# Patient Record
Sex: MALE | Race: WHITE | NOT HISPANIC OR LATINO | Employment: OTHER | URBAN - METROPOLITAN AREA
[De-identification: names, ages, dates, MRNs, and addresses within clinical notes are randomized per-mention and may not be internally consistent; named-entity substitution may affect disease eponyms.]

---

## 2017-05-11 ENCOUNTER — ALLSCRIPTS OFFICE VISIT (OUTPATIENT)
Dept: OTHER | Facility: OTHER | Age: 57
End: 2017-05-11

## 2017-11-07 ENCOUNTER — ALLSCRIPTS OFFICE VISIT (OUTPATIENT)
Dept: OTHER | Facility: OTHER | Age: 57
End: 2017-11-07

## 2017-11-07 DIAGNOSIS — E11.69 TYPE 2 DIABETES MELLITUS WITH OTHER SPECIFIED COMPLICATION (HCC): ICD-10-CM

## 2017-11-07 DIAGNOSIS — Z12.11 ENCOUNTER FOR SCREENING FOR MALIGNANT NEOPLASM OF COLON: ICD-10-CM

## 2017-11-07 DIAGNOSIS — Z12.5 ENCOUNTER FOR SCREENING FOR MALIGNANT NEOPLASM OF PROSTATE: ICD-10-CM

## 2017-11-15 NOTE — PROGRESS NOTES
Assessment    1  Diabetes mellitus type 2 in obese (250 00,278 00) (E11 69,E66 9)   2  Morbid obesity (278 01) (E66 01)   3  De Quervain's tenosynovitis (727 04) (M65 4)   4  Screening PSA (prostate specific antigen) (V76 44) (Z12 5)   5  Colon cancer screening (V76 51) (Z12 11)    Plan  Colon cancer screening    · (1) OCCULT BLOOD, FECAL IMMUNOCHEMICAL TEST; Status:Active; Requestedfor:07Nov2017;   Jens Almonte tenosynovitis    · 2 - Mendoza KIRBY, Tracey Mesa  (Orthopedic Surgery) Co-Management  *  Status: Active Requested for: [id number]  Care Summary provided  : Yes  Diabetes mellitus type 2 in obese    · (1) CBC/PLT/DIFF; Status:Active; Requested CBW:[id number];    · (1) COMPREHENSIVE METABOLIC PANEL; Status:Active; Requested for:07Nov2017;    · (1) HEMOGLOBIN A1C; Status:Active; Requested FNN:11FLC3625;    · (1) LIPID PANEL, FASTING; Status:Active; Requested for:07Nov2017;    · (1) MICROALBUMIN CREATININE RATIO, RANDOM URINE; Status:Active; Requestedfor:07Nov2017;    · (1) TSH WITH FT4 REFLEX; Status:Active; Requested for:07Nov2017;    · *VB - Foot Exam; Status:Active; Requested AHA:29PVG7060;   Left leg pain    · Ibuprofen 800 MG Oral Tablet; TAKE 1 TABLET EVERY 8 HOURS AS NEEDED,DO NOT USE VOLTAREN GEL IF TAKING IBUPROFEN  Screening PSA (prostate specific antigen)    · (1) PSA (SCREEN) (Dx V76 44 Screen for Prostate Cancer); Status:Active; Requestedfor:07Nov2017;     Discussion/Summary    Pt is willing to have prostate exam but requests male doctor to perform, will schedule with urol, f/u after testing  The patient was counseled regarding diagnostic results,-- instructions for management,-- patient and family education,-- impressions  The treatment plan was reviewed with the patient/guardian  The patient/guardian understands and agrees with the treatment plan      Chief Complaint  Pt is here for a check up/        History of Present Illness  female  with pt, states, main c/o is still left leg can't even really walk, uses scooter when at home, has to hoist himself up into his truck with his arms, pulled the one arm the other dayc/o left thumb base pains for many years, hit it with a sledgehammer about 10 yrs ago and has looked enlarged to him      Review of Systems   Constitutional: No fever or chills, feels well, no tiredness, no recent weight gain or weight loss  Eyes: No complaints of eye pain, no red eyes, no discharge from eyes, no itchy eyes  ENT: no complaints of earache, no hearing loss, no nosebleeds, no nasal discharge, no sore throat, no hoarseness  Cardiovascular: No complaints of slow heart rate, no fast heart rate, no chest pain, no palpitations, no leg claudication, no lower extremity  Respiratory: shortness of breath-- and-- shortness of breath during exertion, but-- no cough-- and-- no wheezing  Gastrointestinal: No complaints of abdominal pain, no constipation, no nausea or vomiting, no diarrhea or bloody stools  Genitourinary: No complaints of dysuria, no incontinence, no hesitancy, no nocturia, no genital lesion, no testicular pain  Musculoskeletal: as noted in HPI  Integumentary: No complaints of skin rash or skin lesions, no itching, no skin wound, no dry skin  Neurological: No compliants of headache, no confusion, no convulsions, no numbness or tingling, no dizziness or fainting, no limb weakness, no difficulty walking  Psychiatric: Is not suicidal, no sleep disturbances, no anxiety or depression, no change in personality, no emotional problems  Endocrine: no proptosis,-- no muscle weakness,-- no deepening of the voice,-- no hot flashes-- and-- no feelings of weakness  Hematologic/Lymphatic: No complaints of swollen glands, no swollen glands in the neck, does not bleed easily, no easy bruising  Active Problems  1  Arthralgia of multiple sites, bilateral (719 49) (M25 50)   2   Diabetes mellitus type 2 in obese (250 00,278 00) (E11 69,E66 9)   3  Elevated C-reactive protein (CRP) (790 95) (R79 82)   4  Flu vaccine need (V04 81) (Z23)   5  Hyperglycemia (790 29) (R73 9)   6  Keloid scar of skin (701 4) (L91 0)   7  Left leg pain (729 5) (M79 605)   8  Need for influenza vaccination (V04 81) (Z23)   9  Need for Tdap vaccination (V06 1) (Z23)   10  S/P CABG x 2 (V45 81) (Z95 1)    Past Medical History    The active problems and past medical history were reviewed and updated today  Surgical History    The surgical history was reviewed and updated today  Family History  Mother    1  Family history of arthritis (V17 7) (Z82 61)   2  Family history of cardiac disorder (V17 49) (Z82 49)   3  Family history of stroke (V17 1) (Z82 3)    The family history was reviewed and updated today  Social History     · Caffeine use (V49 89) (F15 90)   · Former smoker (A29 70) (C92 195)   · No alcohol use  The social history was reviewed and updated today  The social history was reviewed and is unchanged  Current Meds   1  Aspirin 325 MG Oral Tablet; TAKE 1 TABLET DAILY; Therapy: (Recorded:59Qlw5691) to Recorded   2  Atorvastatin Calcium 80 MG Oral Tablet; take 1 tablet by mouth once daily; Therapy: 71Rfb1814 to (Janeal Stagers)  Requested for: 43Qcs1680; Last Rx:07Ljb2832 Ordered   3  Diclofenac Sodium 1 % Transdermal Gel; apply 2 grams to affected area every 6 hours PRN; Therapy: 68TCL3167 to (Last Rx:11Qfn1375)  Requested for: 48HPW1589 Ordered   4  Ibuprofen 800 MG Oral Tablet; TAKE 1 TABLET EVERY 8 HOURS AS NEEDED, DO NOT USE VOLTAREN GEL IF TAKING IBUPROFEN; Therapy: 78SPL2743 to (Evaluate:59Phe6892)  Requested for: 68WJG0090; Last Rx:03Tzq9169 Ordered   5  Lisinopril 20 MG Oral Tablet; TAKE 1 TABLET DAILY  Requested for: 22Uup2441; Last Rx:34Byd6163 Ordered   6  MetFORMIN HCl - 500 MG Oral Tablet; TAKE 2 TABLETS BY MOUTH EVERY MORNING AND TAKE 1 TABLET BY MOUTH EVERY EVENING; Therapy: 84Cmn7514 to (Janeal Stagers)  Requested for: 85Gxx9088; Last Rx:90Zpj2764 Ordered   7  Metoprolol Tartrate 50 MG Oral Tablet; take 1/2 tablet by mouth twice a day; Therapy: 35GMX9130 to (Renita Olguin)  Requested for: 61SMW3289; Last Rx:11Oct2017 Ordered   8  Nitrostat 0 4 MG Sublingual Tablet Sublingual; DISSOLVE 1 TABLET UNDER THE TONGUE AS NEEDED FOR CHEST PAIN  Requested for: 11LSK6354; Last Rx:23Oct2015 Ordered    The medication list was reviewed and updated today  Allergies  1  No Known Drug Allergies    Vitals  Vital Signs    Recorded: 90GID2874 04:49PM   Temperature 98 7 F   Heart Rate 77   Respiration 18   Systolic 241   Diastolic 88   Height 5 ft 3 in   Weight 334 lb    BMI Calculated 59 17   BSA Calculated 2 4   O2 Saturation 98       Physical Exam   Constitutional  General appearance: Abnormal  -- unchanged  Eyes  Conjunctiva and lids: No swelling, erythema, or discharge  Pupils and irises: Equal, round and reactive to light  Ears, Nose, Mouth, and Throat  Oropharynx: Normal with no erythema, edema, exudate or lesions  Pulmonary  Respiratory effort: No increased work of breathing or signs of respiratory distress  Auscultation of lungs: Clear to auscultation, equal breath sounds bilaterally, no wheezes, no rales, no rhonci  Cardiovascular  Auscultation of heart: Normal rate and rhythm, normal S1 and S2, without murmurs  Examination of extremities for edema and/or varicosities: Abnormal   bilateral ankle pitting edema, but-- no pretibial edema,-- no knee edema-- and-- no sacrum edema  Abdomen  Abdomen: Non-tender, no masses  -- except girth  Liver and spleen: No hepatomegaly or splenomegaly  Musculoskeletal  Digits and nails: Normal without clubbing or cyanosis  Inspection/palpation of joints, bones, and muscles: Abnormal  -- left thumb base very tender  Skin  Skin and subcutaneous tissue: Normal without rashes or lesions  Neurologic  Reflexes: 2+ and symmetric  Sensation: No sensory loss     Psychiatric  Mood and affect: Normal    Diabetic Foot Screen: Normal    Socks and shoes removed, the Right Foot: the foot was normal, no swelling, no erythema  The toes on the right were normal   The sensory exam showed  normal vibratory sensation at the level of the toes on the right  Normal tactile sensation with monofilament testing throughout the right foot  Socks and shoes removed, Left Foot: the foot was normal, no swelling, no erythema  The toes on the left were normal  Normal tactile sensation with monofilament testing throughout the left foot  Capillary refills findings on the right were normal in the toes  Pulses:  2+ in the posterior tibialis on the right  2+ in the dorsalis pedis on the right  Capillary refills findings on the left were normal in the toes  Pulses:  2+ in the posterior tibialis on the left  2+ in the dorsalis pedis on the left  Assign Risk Category: 0: No loss of protective sensation, no deformity  No present risk        Signatures   Electronically signed by : Diego Graham DO; Nov 14 2017 11:32PM EST                       (Author)

## 2017-11-29 ENCOUNTER — GENERIC CONVERSION - ENCOUNTER (OUTPATIENT)
Dept: OTHER | Facility: OTHER | Age: 57
End: 2017-11-29

## 2018-01-12 VITALS
HEART RATE: 80 BPM | BODY MASS INDEX: 55.81 KG/M2 | OXYGEN SATURATION: 96 % | HEIGHT: 63 IN | TEMPERATURE: 98.4 F | RESPIRATION RATE: 20 BRPM | WEIGHT: 315 LBS

## 2018-01-14 VITALS
HEIGHT: 63 IN | TEMPERATURE: 98.7 F | BODY MASS INDEX: 55.81 KG/M2 | SYSTOLIC BLOOD PRESSURE: 130 MMHG | RESPIRATION RATE: 18 BRPM | HEART RATE: 77 BPM | WEIGHT: 315 LBS | OXYGEN SATURATION: 98 % | DIASTOLIC BLOOD PRESSURE: 88 MMHG

## 2018-07-25 DIAGNOSIS — I25.10 CORONARY ARTERY DISEASE INVOLVING NATIVE CORONARY ARTERY OF NATIVE HEART WITHOUT ANGINA PECTORIS: Primary | ICD-10-CM

## 2018-07-25 DIAGNOSIS — Z95.1 HX OF CABG: ICD-10-CM

## 2018-10-22 DIAGNOSIS — I25.2 OLD MI (MYOCARDIAL INFARCTION): ICD-10-CM

## 2018-10-22 DIAGNOSIS — I25.10 ATHSCL HEART DISEASE OF NATIVE CORONARY ARTERY W/O ANG PCTRS: Primary | ICD-10-CM

## 2018-11-14 ENCOUNTER — OFFICE VISIT (OUTPATIENT)
Dept: CARDIOLOGY CLINIC | Facility: CLINIC | Age: 58
End: 2018-11-14
Payer: COMMERCIAL

## 2018-11-14 VITALS
HEIGHT: 65 IN | BODY MASS INDEX: 52.48 KG/M2 | WEIGHT: 315 LBS | SYSTOLIC BLOOD PRESSURE: 126 MMHG | HEART RATE: 76 BPM | DIASTOLIC BLOOD PRESSURE: 84 MMHG

## 2018-11-14 DIAGNOSIS — I25.810 CORONARY ARTERY DISEASE INVOLVING AUTOLOGOUS VEIN CORONARY BYPASS GRAFT WITHOUT ANGINA PECTORIS: Primary | ICD-10-CM

## 2018-11-14 DIAGNOSIS — E78.2 MIXED HYPERLIPIDEMIA: ICD-10-CM

## 2018-11-14 DIAGNOSIS — I10 ESSENTIAL HYPERTENSION: ICD-10-CM

## 2018-11-14 DIAGNOSIS — E11.9 TYPE 2 DIABETES MELLITUS WITHOUT COMPLICATION, WITHOUT LONG-TERM CURRENT USE OF INSULIN (HCC): ICD-10-CM

## 2018-11-14 DIAGNOSIS — E66.01 MORBID OBESITY (HCC): ICD-10-CM

## 2018-11-14 DIAGNOSIS — I25.810 CORONARY ARTERY DISEASE INVOLVING CORONARY BYPASS GRAFT OF NATIVE HEART WITHOUT ANGINA PECTORIS: ICD-10-CM

## 2018-11-14 PROCEDURE — 93000 ELECTROCARDIOGRAM COMPLETE: CPT | Performed by: PHYSICIAN ASSISTANT

## 2018-11-14 PROCEDURE — 99214 OFFICE O/P EST MOD 30 MIN: CPT | Performed by: PHYSICIAN ASSISTANT

## 2018-11-14 RX ORDER — LISINOPRIL 5 MG/1
1 TABLET ORAL DAILY
COMMUNITY
Start: 2017-11-28

## 2018-11-14 RX ORDER — ATORVASTATIN CALCIUM 80 MG/1
80 TABLET, FILM COATED ORAL DAILY
COMMUNITY

## 2018-11-14 RX ORDER — ASPIRIN 325 MG
1 TABLET ORAL DAILY
COMMUNITY
End: 2019-10-22 | Stop reason: SDUPTHER

## 2018-11-14 RX ORDER — METOPROLOL TARTRATE 50 MG/1
0.5 TABLET, FILM COATED ORAL 2 TIMES DAILY
COMMUNITY
Start: 2017-02-06 | End: 2019-10-22 | Stop reason: SDUPTHER

## 2018-11-14 RX ORDER — IBUPROFEN 800 MG/1
800 TABLET ORAL DAILY
COMMUNITY
End: 2020-08-02 | Stop reason: SDUPTHER

## 2018-11-14 RX ORDER — NITROGLYCERIN 0.4 MG/1
1 TABLET SUBLINGUAL AS NEEDED
COMMUNITY

## 2018-11-14 RX ORDER — TRAMADOL HYDROCHLORIDE 50 MG/1
50 TABLET ORAL AS NEEDED
COMMUNITY
End: 2022-01-26

## 2018-11-14 NOTE — PROGRESS NOTES
[facility]   Outpatient Note  Josephine Dailey  1960  [id number]  [facility] [place] [address] [facility] [place] [id number] [phone]    Subjective:   Josephine Dailey is a 63 y o  male    The patient is seen in the office for a routine visit regarding a history of CAD with CABG in 2013  He also has a history of HTN, HLD and DMII  He has been doing well without complaints of exertional symptoms  He has no chest pain or discomfort  He denies exertional dyspnea  He has no edema orthopnea or PND  He does not exercise aerobically because of "a bad hip"        PMH/PSH  Reviewd  History   Smoking Status    Former Smoker   Smokeless Tobacco    Former User   ,   History   Alcohol use Not on file   ,   History   Drug use: Unknown     History reviewed  No pertinent family history  Current Outpatient Prescriptions:     aspirin 325 mg tablet, Take 1 tablet by mouth daily, Disp: , Rfl:     atorvastatin (LIPITOR) 80 mg tablet, Take 80 mg by mouth daily, Disp: , Rfl:     ibuprofen (MOTRIN) 800 mg tablet, Take 800 mg by mouth daily, Disp: , Rfl:     lisinopril (ZESTRIL) 20 mg tablet, Take 1 tablet by mouth daily, Disp: , Rfl:     metFORMIN (GLUCOPHAGE) 500 mg tablet, Take 500 mg by mouth daily, Disp: , Rfl:     metoprolol tartrate (LOPRESSOR) 50 mg tablet, Take 0 5 tablets by mouth 2 (two) times a day, Disp: , Rfl:     nitroglycerin (NITROSTAT) 0 4 mg SL tablet, Place 1 tablet under the tongue as needed, Disp: , Rfl:     traMADol (ULTRAM) 50 mg tablet, 50 mg as needed, Disp: , Rfl:   No Known Allergies    Review of Systems   Constitution: Negative  HENT: Negative  Eyes: Negative  Cardiovascular: Negative  Negative for chest pain, claudication, cyanosis, dyspnea on exertion, irregular heartbeat, leg swelling, near-syncope, orthopnea, palpitations, paroxysmal nocturnal dyspnea and syncope  Respiratory: Negative  Negative for cough, hemoptysis, shortness of breath, sleep disturbances due to breathing, snoring, sputum production and wheezing  Endocrine: Negative  Hematologic/Lymphatic: Negative  Skin: Negative  Musculoskeletal: Positive for joint pain (l-hip)  Gastrointestinal: Negative  Genitourinary: Negative  Neurological: Negative  Psychiatric/Behavioral: Negative  Allergic/Immunologic: Negative  Objective:   /84   Pulse 76   Ht 5' 5" (1 651 m)   Wt (!) 159 kg (350 lb)   BMI 58 24 kg/m²   Physical Exam   Constitutional: He is oriented to person, place, and time  He appears well-developed and well-nourished  HENT:   Head: Normocephalic and atraumatic  Eyes: Conjunctivae are normal  No scleral icterus  Neck: Normal range of motion  Neck supple  No JVD present  No thyromegaly present  Cardiovascular: Normal rate, regular rhythm and normal heart sounds  Exam reveals no gallop and no friction rub  No murmur heard  Pulmonary/Chest: No respiratory distress  He has no wheezes  He has no rales  Abdominal: Soft  Bowel sounds are normal  He exhibits distension (morbidly obese)  There is no tenderness  Musculoskeletal: He exhibits no edema, tenderness or deformity  Unable to exercise     Neurological: He is alert and oriented to person, place, and time  Psychiatric: He has a normal mood and affect  His behavior is normal    Nursing note and vitals reviewed  Lab Review:   None recent    Recent Cardiac Testing:   Most recent testing was an echo in 2016 showing normal LV function with no valvular disease       ECG Review:   Normal sinus with possible old IWMI, Non-specific ST changes    Assessment:     Problem List Items Addressed This Visit     CAD (coronary artery disease), autologous vein bypass graft - Primary--Stable without active angina or acute symptoms of CHF    Relevant Medications    metoprolol tartrate (LOPRESSOR) 50 mg tablet    nitroglycerin (NITROSTAT) 0 4 mg SL tablet    Other Relevant Orders    NM myocardial perfusion spect (rx stress and/or rest)    Mixed hyperlipidemia--on Statins    Relevant Medications    atorvastatin (LIPITOR) 80 mg tablet    Other Relevant Orders    NM myocardial perfusion spect (rx stress and/or rest)    Essential hypertension    Relevant Medications    metoprolol tartrate (LOPRESSOR) 50 mg tablet    lisinopril (ZESTRIL) 20 mg tablet    Other Relevant Orders    NM myocardial perfusion spect (rx stress and/or rest)    Type 2 diabetes mellitus without complication, without long-term current use of insulin ([facility])--managed by PCP    Relevant Medications    metFORMIN (GLUCOPHAGE) 500 mg tablet    Other Relevant Orders    NM myocardial perfusion spect (rx stress and/or rest)    Morbid obesity (HCC)--Low exercise tolerance, would not be a good candidate for treadmill stress testing  Relevant Orders    NM myocardial perfusion spect (rx stress and/or rest)      Other Visit Diagnoses     Coronary artery disease involving coronary bypass graft of native heart without angina pectoris-- Due to ambulatory dysfunction, a Pharmacological Nuclear stress test will be ordered 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

## 2018-11-19 ENCOUNTER — TELEPHONE (OUTPATIENT)
Dept: FAMILY MEDICINE CLINIC | Facility: CLINIC | Age: 58
End: 2018-11-19

## 2019-10-22 ENCOUNTER — OFFICE VISIT (OUTPATIENT)
Dept: CARDIOLOGY CLINIC | Facility: CLINIC | Age: 59
End: 2019-10-22
Payer: COMMERCIAL

## 2019-10-22 VITALS
DIASTOLIC BLOOD PRESSURE: 80 MMHG | WEIGHT: 315 LBS | BODY MASS INDEX: 52.48 KG/M2 | SYSTOLIC BLOOD PRESSURE: 140 MMHG | HEART RATE: 85 BPM | HEIGHT: 65 IN

## 2019-10-22 DIAGNOSIS — I25.810 CORONARY ARTERY DISEASE INVOLVING AUTOLOGOUS VEIN CORONARY BYPASS GRAFT WITHOUT ANGINA PECTORIS: Primary | ICD-10-CM

## 2019-10-22 DIAGNOSIS — E78.2 MIXED HYPERLIPIDEMIA: ICD-10-CM

## 2019-10-22 DIAGNOSIS — I10 BENIGN ESSENTIAL HTN: ICD-10-CM

## 2019-10-22 PROCEDURE — 93000 ELECTROCARDIOGRAM COMPLETE: CPT | Performed by: INTERNAL MEDICINE

## 2019-10-22 PROCEDURE — 99214 OFFICE O/P EST MOD 30 MIN: CPT | Performed by: INTERNAL MEDICINE

## 2019-10-22 NOTE — LETTER
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

## 2019-10-22 NOTE — PROGRESS NOTES
Patient ID: Linn Jimenez is a 63 y o  male  Plan:      Benign essential HTN  Adequately controlled  Mixed hyperlipidemia  Tolerating statin tx  Morbid obesity ([address] )  Will think about lowering carb intake  Follow up Plan:  1 year EKG and follow-up visit  HPI:   The patient is seen in follow-up today regarding CAD, hyperlipidemia, and hypertension  He also has obesity  He admits to eating too much bread and bread equivalence  No chest pain or chest pressure  No syncope or near syncope  He is now 6 years post CABG  Results for orders placed or performed in visit on 10/22/19   POCT ECG    Impression    NSR  WNL  Most recent or relevant cardiac/vascular testing:    CABG 8/5/2013 at [address]  Mammary graft the LAD  Saphenous vein graft to 1st obtuse marginal       Past Surgical History:   Procedure Laterality Date    CARDIAC CATHETERIZATION  08/02/2013    EF 50% Severe left main disease 90%, OM1 99%, 100% RCA (bypass scheduled)    CORONARY ARTERY BYPASS GRAFT  08/05/2013    CABG X2 LIMA-LAD, VG-OM1     CMP:   Lab Results   Component Value Date     01/08/2014    K 4 5 01/08/2014    CL 97 (L) 01/08/2014    CO2 26 0 01/08/2014    BUN 11 01/08/2014    CREATININE 0 72 01/08/2014    GLUCOSE 113 01/08/2014       Lipid Profile:   Lab Results   Component Value Date    CHOL 173 01/08/2014    TRIG 142 01/08/2014    HDL 45 01/08/2014         Review of Systems   10  point ROS  was otherwise non pertinent or negative except as per HPI or as below  Gait:  Walks with a limp  Objective:     /80   Pulse 85   Ht 5' 5" (1 651 m)   Wt (!) 156 kg (343 lb)   BMI 57 08 kg/m²     PHYSICAL EXAM:    General:  Normal appearance in no distress  Eyes:  Anicteric  Oral mucosa:  Moist   Neck:  No JVD  Carotid upstrokes are brisk without bruits  No masses  Chest:  Clear to auscultation and percussion  Well-healed midline sternotomy scar  Cardiac:  Normal PMI  Normal S1 and S2    No murmur gallop or rub  Abdomen:  Soft and nontender  No palpable organomegaly or aortic enlargement  Extremities:  No peripheral edema  Musculoskeletal:  Symmetric  Vascular:  Femoral pulses are brisk without bruits  Popliteal pulses are intact bilaterally  Pedal pulses are intact  Neuro:  Grossly symmetric  Psych:  Alert and oriented x3  Current Outpatient Medications:     atorvastatin (LIPITOR) 80 mg tablet, Take 80 mg by mouth daily, Disp: , Rfl:     ibuprofen (MOTRIN) 800 mg tablet, Take 800 mg by mouth daily, Disp: , Rfl:     lisinopril (ZESTRIL) 5 mg tablet, Take 1 tablet by mouth daily , Disp: , Rfl:     metFORMIN (GLUCOPHAGE) 500 mg tablet, daily Take 2 tablets AM and 1 tablet PM, Disp: , Rfl:     metoprolol tartrate (LOPRESSOR) 50 mg tablet, Take 0 5 tablets by mouth 2 (two) times a day, Disp: , Rfl:     nitroglycerin (NITROSTAT) 0 4 mg SL tablet, Place 1 tablet under the tongue as needed, Disp: , Rfl:     aspirin 325 mg tablet, Take 1 tablet by mouth daily, Disp: , Rfl:     traMADol (ULTRAM) 50 mg tablet, 50 mg as needed, Disp: , Rfl:   No Known Allergies  Past Medical History:   Diagnosis Date    CAD (coronary artery disease)     s/p CABG x 2 LIMA-LAD, VG- OM1 8/5/2013    Carotid duplex 08/05/2013    20-49% bilateral stenosis    Diabetes ([facility] [address] )     History of echocardiogram 12/22/2016    EF 55% Mild LVH   Mild MR    Hyperlipidemia     Hypertension            Social History     Tobacco Use   Smoking Status Former Smoker   Smokeless Tobacco Former User

## 2020-01-02 ENCOUNTER — OFFICE VISIT (OUTPATIENT)
Dept: URGENT CARE | Facility: CLINIC | Age: 60
End: 2020-01-02
Payer: COMMERCIAL

## 2020-01-02 VITALS
SYSTOLIC BLOOD PRESSURE: 147 MMHG | RESPIRATION RATE: 18 BRPM | DIASTOLIC BLOOD PRESSURE: 98 MMHG | OXYGEN SATURATION: 98 % | TEMPERATURE: 98.9 F | HEART RATE: 101 BPM

## 2020-01-02 DIAGNOSIS — L08.9 LOCAL INFECTION OF THE SKIN AND SUBCUTANEOUS TISSUE, UNSPECIFIED: Primary | ICD-10-CM

## 2020-01-02 PROCEDURE — 99203 OFFICE O/P NEW LOW 30 MIN: CPT | Performed by: PHYSICIAN ASSISTANT

## 2020-01-02 RX ORDER — SULFAMETHOXAZOLE AND TRIMETHOPRIM 800; 160 MG/1; MG/1
1 TABLET ORAL EVERY 12 HOURS SCHEDULED
Qty: 20 TABLET | Refills: 0 | Status: SHIPPED | OUTPATIENT
Start: 2020-01-02 | End: 2020-01-12

## 2020-01-02 NOTE — PROGRESS NOTES
[facility]        NAME: Mak Cancino is a 63 y o  male  : 1960    MRN: [id number]  DATE: 2020  TIME: 8:48 AM    Assessment and Plan   Local infection of the skin and subcutaneous tissue, unspecified [L08 9]  1  Local infection of the skin and subcutaneous tissue, unspecified  sulfamethoxazole-trimethoprim (BACTRIM DS) 800-160 mg per tablet         Patient Instructions     Patient Instructions   Take medication as prescribed  Keep area clean and dry  Apply topical antibiotic  Follow up with PCP in 3-5 days  Go to ER if fever, worsening redness, swelling, or discharge  Chief Complaint     Chief Complaint   Patient presents with    Infection     Pt c/o a sore on his stomach for a week  History of Present Illness       81-year-old male presents to clinic with complaints of of skin wound on right lower abdomen x1 week  Patient reports this started as a pimple and had his wife pop it,  now it has larger and more red  He denies any fever or chills  Review of Systems   Review of Systems   Constitutional: Negative for chills and fever  Respiratory: Negative for shortness of breath  Cardiovascular: Negative for chest pain  Gastrointestinal: Negative for abdominal pain, diarrhea, nausea and vomiting  Skin: Positive for wound  Neurological: Negative for headaches           Current Medications       Current Outpatient Medications:     aspirin 81 MG tablet, Take 1 tablet (81 mg total) by mouth daily, Disp: , Rfl:     atorvastatin (LIPITOR) 80 mg tablet, Take 80 mg by mouth daily, Disp: , Rfl:     ibuprofen (MOTRIN) 800 mg tablet, Take 800 mg by mouth daily, Disp: , Rfl:     lisinopril (ZESTRIL) 5 mg tablet, Take 1 tablet by mouth daily , Disp: , Rfl:     metFORMIN (GLUCOPHAGE) 500 mg tablet, daily Take 2 tablets AM and 1 tablet PM, Disp: , Rfl:     metoprolol tartrate (LOPRESSOR) 25 mg tablet, Take 1 tablet (25 mg total) by mouth 2 (two) times a day, Disp: 180 tablet, Rfl: 5    nitroglycerin (NITROSTAT) 0 4 mg SL tablet, Place 1 tablet under the tongue as needed, Disp: , Rfl:     sulfamethoxazole-trimethoprim (BACTRIM DS) 800-160 mg per tablet, Take 1 tablet by mouth every 12 (twelve) hours for 10 days, Disp: 20 tablet, Rfl: 0    traMADol (ULTRAM) 50 mg tablet, 50 mg as needed, Disp: , Rfl:     Current Allergies     Allergies as of 01/02/2020    (No Known Allergies)            The following portions of the patient's history were reviewed and updated as appropriate: allergies, current medications, past family history, past medical history, past social history, past surgical history and problem list      Past Medical History:   Diagnosis Date    CAD (coronary artery disease)     s/p CABG x 2 LIMA-LAD, VG- OM1 8/5/2013    Carotid duplex 08/05/2013    20-49% bilateral stenosis    Diabetes ([facility] [address] )     History of echocardiogram 12/22/2016    EF 55% Mild LVH  Mild MR    Hyperlipidemia     Hypertension        Past Surgical History:   Procedure Laterality Date    CARDIAC CATHETERIZATION  08/02/2013    EF 50% Severe left main disease 90%, OM1 99%, 100% RCA (bypass scheduled)    CORONARY ARTERY BYPASS GRAFT  08/05/2013    CABG X2 LIMA-LAD, VG-OM1       History reviewed  No pertinent family history  Medications have been verified  Objective   /98   Pulse 101   Temp 98 9 °F (37 2 °C)   Resp 18   SpO2 98%        Physical Exam     Physical Exam   Constitutional: He appears well-developed and well-nourished  HENT:   Head: Normocephalic and atraumatic  Cardiovascular: Normal rate, regular rhythm and normal heart sounds  Pulmonary/Chest: Effort normal and breath sounds normal  No respiratory distress  Abdominal:   2 x 2 cm scabbed wound with surrounding erythema, mild heat, and mild tenderness to palpation of right lower abdominal wall  No discharge  No induration or fluctuance  No streaking erythema  No necrosis     Skin: Capillary refill takes less than 2 seconds

## 2020-01-02 NOTE — PATIENT INSTRUCTIONS
Take medication as prescribed  Keep area clean and dry  Apply topical antibiotic  Follow up with PCP in 3-5 days  Go to ER if fever, worsening redness, swelling, or discharge

## 2020-01-31 ENCOUNTER — OFFICE VISIT (OUTPATIENT)
Dept: URGENT CARE | Facility: CLINIC | Age: 60
End: 2020-01-31
Payer: COMMERCIAL

## 2020-01-31 ENCOUNTER — APPOINTMENT (OUTPATIENT)
Dept: RADIOLOGY | Facility: CLINIC | Age: 60
End: 2020-01-31
Payer: COMMERCIAL

## 2020-01-31 VITALS
HEART RATE: 78 BPM | TEMPERATURE: 97.9 F | OXYGEN SATURATION: 97 % | RESPIRATION RATE: 18 BRPM | DIASTOLIC BLOOD PRESSURE: 79 MMHG | SYSTOLIC BLOOD PRESSURE: 165 MMHG

## 2020-01-31 DIAGNOSIS — M89.8X9 DEGENERATIVE DISORDER OF BONE: Primary | ICD-10-CM

## 2020-01-31 DIAGNOSIS — M79.605 LEFT LEG PAIN: ICD-10-CM

## 2020-01-31 DIAGNOSIS — M16.12 ARTHRITIS OF LEFT HIP: ICD-10-CM

## 2020-01-31 PROCEDURE — 99213 OFFICE O/P EST LOW 20 MIN: CPT | Performed by: NURSE PRACTITIONER

## 2020-01-31 PROCEDURE — 73562 X-RAY EXAM OF KNEE 3: CPT

## 2020-01-31 PROCEDURE — 73502 X-RAY EXAM HIP UNI 2-3 VIEWS: CPT

## 2020-01-31 RX ORDER — PREDNISONE 10 MG/1
TABLET ORAL
Qty: 42 TABLET | Refills: 0 | Status: SHIPPED | OUTPATIENT
Start: 2020-01-31 | End: 2020-08-03 | Stop reason: ALTCHOICE

## 2020-01-31 NOTE — PROGRESS NOTES
[facility]        NAME: Cheyenne Crockett is a 63 y o  male  : 1960    MRN: [id number]  DATE: 2020  TIME: 12:24 PM    Assessment and Plan   Degenerative disorder of bone [M89 8X9]  1  Degenerative disorder of bone  Ambulatory referral to Orthopedic Surgery    Ambulatory referral to Physical Therapy    predniSONE 10 mg tablet   2  Left leg pain  XR hip/pelv 2-3 vws left if performed    XR knee 3 vw left non injury    Ambulatory referral to Orthopedic Surgery    Ambulatory referral to Physical Therapy    predniSONE 10 mg tablet   3  Arthritis of left hip  Ambulatory referral to Orthopedic Surgery    Ambulatory referral to Physical Therapy    predniSONE 10 mg tablet         Patient Instructions     Patient Instructions   No acute changes seen on x-ray, but some arthritis seen in left knee and significant arthritis and degenerative changes seen in left hip  Take the prednisone taper as ordered until completed to help get some temporary relief  Use tylenol arthritis as needed (1300 mg every 8 hours as needed; just make sure that none of your other medications contain tylenol/acetaminophen)  Follow-up with ortho and physical therapy  Osteoarthritis   AMBULATORY CARE:   Osteoarthritis  occurs when cartilage (tissue that cushions a joint) wears away slowly and causes the bones to rub together  Osteoarthritis (OA) is a long-term condition that often affects the hands, neck, lower back, knees, and hips  OA is also called arthrosis or degenerative joint disease  Common signs and symptoms include the following:   · Joint pain that gets worse when you move the joint     · Joint stiffness that decreases after you move the joint     · Decreased range of movement     · Hard, bony enlargement on your fingers or toes    · A grinding or cracking sound when you move your joint  Seek care immediately if:   · You have severe pain  · You cannot move your joint    Contact your healthcare provider if: · You have a fever  · Your joint is red and tender  · You have questions or concerns about your condition or care  Treatment for osteoarthritis  may include any of the following:  · Acetaminophen  is used to decrease pain  It is available without a doctor's order  Ask how much to take and how often to take it  Follow directions  Acetaminophen can cause liver damage if not taken correctly  · NSAIDs , such as ibuprofen, help decrease swelling, pain, and fever  This medicine is available with or without a doctor's order  NSAIDs can cause stomach bleeding or kidney problems in certain people  If you take blood thinner medicine, always ask your healthcare provider if NSAIDs are safe for you  Always read the medicine label and follow directions  · Capsaicin cream  may help decrease pain in your joint  · Prescription pain medicine  may be given to decrease severe pain if other medicines do not work  Take the medicine as directed  Do not wait until the pain is severe before you take your medicine  · A steroid injection  may be given if your symptoms get worse  · Physical therapy  is used to teach you exercises to help improve movement and strength, and to decrease pain  · Surgery  may be needed if other treatments do not work  Manage osteoarthritis   · Stay active  Physical activity may reduce your pain and improve your ability to do daily activities  Avoid activities that cause pain  Ask your healthcare provider what type of exercise would be best for you  · Maintain a healthy weight  This helps decrease the strain on the joints in your back, hips, knees, ankles, and feet  Ask your healthcare provider how much you should weigh  Ask him to help you create a weight loss plan if you are overweight  · Use heat or ice  on your joints as directed  Heat and ice help decrease pain, swelling, and muscle spasms  Use a heating pad on a low setting or take a warm bath   Use an ice pack, or put crushed ice in a plastic bag  Cover it with a towel  · Massage  the muscles around the joint to relieve pain and stiffness  · Use a cane, crutches, or a walker  to protect and relieve pressure on your ankle, knee, and hip joints  You may also be prescribed shoe inserts to decrease pressure in your joints  · Wear flat or low-heeled shoes  This will help decrease pain and reduce pressure on your ankle, knee, and hip joints  Follow up with your healthcare provider as directed:  Write down your questions so you remember to ask them during your visits  © 2017 [address] [employer] Information is for End User's use only and may not be sold, redistributed or otherwise used for commercial purposes  All illustrations and images included in CareNotes® are the copyrighted property of [email or web address]  or Luis Enrique Mccarthy  The above information is an  only  It is not intended as medical advice for individual conditions or treatments  Talk to your doctor, nurse or pharmacist before following any medical regimen to see if it is safe and effective for you  Follow up with PCP in 3-5 days  Proceed to  ER if symptoms worsen  Chief Complaint     Chief Complaint   Patient presents with    Leg Pain     Pt c/o left leg pain for two years  History of Present Illness       Patient reports a history of left leg pain x2 years  He states he was told several years ago that he needs a left hip replacement, and was initially told he would be scheduled for surgery, but then was called and told that he could not be operated on due to his BMI  He spoke with his PCP about is BMI this fall when he was seen  When he weighed himself today, he notes he has lost 11 lb  He states that his leg pain has been getting worse  It is primarily in the hip and radiates down to the thigh although he notes that his left knee is sore at times as well    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, 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  He states he other day he made 14 runs which meant getting in and out of the truck 28 times which cause the pain to increase  He notes that he is on a daily baby aspirin, and states that he takes aspirin or something that his wife but in the pill container the pain gets worse, but he is not sure without medication is  He states that he takes whenever she feels his daily medicine container with  He has not seen anybody about his leg pain recently  Review of Systems   Review of Systems   Cardiovascular: Negative for chest pain, palpitations and leg swelling  Musculoskeletal: Positive for arthralgias  All other systems reviewed and are negative          Current Medications       Current Outpatient Medications:     aspirin 81 MG tablet, Take 1 tablet (81 mg total) by mouth daily, Disp: , Rfl:     atorvastatin (LIPITOR) 80 mg tablet, Take 80 mg by mouth daily, Disp: , Rfl:     ibuprofen (MOTRIN) 800 mg tablet, Take 800 mg by mouth daily, Disp: , Rfl:     lisinopril (ZESTRIL) 5 mg tablet, Take 1 tablet by mouth daily , Disp: , Rfl:     metFORMIN (GLUCOPHAGE) 500 mg tablet, daily Take 2 tablets AM and 1 tablet PM, Disp: , Rfl:     metoprolol tartrate (LOPRESSOR) 25 mg tablet, Take 1 tablet (25 mg total) by mouth 2 (two) times a day, Disp: 180 tablet, Rfl: 5    nitroglycerin (NITROSTAT) 0 4 mg SL tablet, Place 1 tablet under the tongue as needed, Disp: , Rfl:     predniSONE 10 mg tablet, 3 tabs BID x 4 days, 2 tabs BID x 3 days, 1 tab BID x 3 days, Disp: 42 tablet, Rfl: 0    traMADol (ULTRAM) 50 mg tablet, 50 mg as needed, Disp: , Rfl:     Current Allergies     Allergies as of 01/31/2020    (No Known Allergies)            The following portions of the patient's history were reviewed and updated as appropriate: allergies, current medications, past family history, past medical history, past social history, past surgical history and problem list      Past Medical History:   Diagnosis Date    CAD (coronary artery disease)     s/p CABG x 2 LIMA-LAD, VG- OM1 8/5/2013    Carotid duplex 08/05/2013    20-49% bilateral stenosis    Diabetes ([facility] [address] )     History of echocardiogram 12/22/2016    EF 55% Mild LVH  Mild MR    Hyperlipidemia     Hypertension        Past Surgical History:   Procedure Laterality Date    CARDIAC CATHETERIZATION  08/02/2013    EF 50% Severe left main disease 90%, OM1 99%, 100% RCA (bypass scheduled)    CORONARY ARTERY BYPASS GRAFT  08/05/2013    CABG X2 LIMA-LAD, VG-OM1       No family history on file  Medications have been verified  Objective   /79   Pulse 78   Temp 97 9 °F (36 6 °C)   Resp 18   SpO2 97%        Physical Exam     Physical Exam   Constitutional: He is oriented to person, place, and time  He appears well-developed and well-nourished  No distress  HENT:   Head: Normocephalic and atraumatic  Eyes: Pupils are equal, round, and reactive to light  Neck: Normal range of motion  Neck supple  Pulmonary/Chest: Effort normal  No respiratory distress  Abdominal: Soft  He exhibits no distension  Musculoskeletal: Normal range of motion  Left hip: He exhibits tenderness and bony tenderness  He exhibits normal range of motion, normal strength, no swelling, no crepitus, no deformity and no laceration  Left knee: He exhibits bony tenderness  He exhibits normal range of motion, no swelling, no effusion, no ecchymosis, no deformity, no laceration, no erythema, normal alignment, no LCL laxity, normal patellar mobility, normal meniscus and no MCL laxity  Tenderness found  Left upper leg: He exhibits tenderness  He exhibits no bony tenderness, no swelling, no edema, no deformity and no laceration  Knee pain is much more mild compared to the hip pain which is significant even with fairly light palpation   Neurological: He is alert and oriented to person, place, and time  Skin: Skin is warm and dry  Capillary refill takes less than 2 seconds  He is not diaphoretic  Psychiatric: He has a normal mood and affect  His behavior is normal  Judgment and thought content normal    Nursing note and vitals reviewed

## 2020-01-31 NOTE — PATIENT INSTRUCTIONS
No acute changes seen on x-ray, but some arthritis seen in left knee and significant arthritis and degenerative changes seen in left hip  Take the prednisone taper as ordered until completed to help get some temporary relief  Use tylenol arthritis as needed (1300 mg every 8 hours as needed; just make sure that none of your other medications contain tylenol/acetaminophen)  Follow-up with ortho and physical therapy  Osteoarthritis   AMBULATORY CARE:   Osteoarthritis  occurs when cartilage (tissue that cushions a joint) wears away slowly and causes the bones to rub together  Osteoarthritis (OA) is a long-term condition that often affects the hands, neck, lower back, knees, and hips  OA is also called arthrosis or degenerative joint disease  Common signs and symptoms include the following:   · Joint pain that gets worse when you move the joint     · Joint stiffness that decreases after you move the joint     · Decreased range of movement     · Hard, bony enlargement on your fingers or toes    · A grinding or cracking sound when you move your joint  Seek care immediately if:   · You have severe pain  · You cannot move your joint  Contact your healthcare provider if:   · You have a fever  · Your joint is red and tender  · You have questions or concerns about your condition or care  Treatment for osteoarthritis  may include any of the following:  · Acetaminophen  is used to decrease pain  It is available without a doctor's order  Ask how much to take and how often to take it  Follow directions  Acetaminophen can cause liver damage if not taken correctly  · NSAIDs , such as ibuprofen, help decrease swelling, pain, and fever  This medicine is available with or without a doctor's order  NSAIDs can cause stomach bleeding or kidney problems in certain people  If you take blood thinner medicine, always ask your healthcare provider if NSAIDs are safe for you   Always read the medicine label and follow directions  · Capsaicin cream  may help decrease pain in your joint  · Prescription pain medicine  may be given to decrease severe pain if other medicines do not work  Take the medicine as directed  Do not wait until the pain is severe before you take your medicine  · A steroid injection  may be given if your symptoms get worse  · Physical therapy  is used to teach you exercises to help improve movement and strength, and to decrease pain  · Surgery  may be needed if other treatments do not work  Manage osteoarthritis   · Stay active  Physical activity may reduce your pain and improve your ability to do daily activities  Avoid activities that cause pain  Ask your healthcare provider what type of exercise would be best for you  · Maintain a healthy weight  This helps decrease the strain on the joints in your back, hips, knees, ankles, and feet  Ask your healthcare provider how much you should weigh  Ask him to help you create a weight loss plan if you are overweight  · Use heat or ice  on your joints as directed  Heat and ice help decrease pain, swelling, and muscle spasms  Use a heating pad on a low setting or take a warm bath  Use an ice pack, or put crushed ice in a plastic bag  Cover it with a towel  · Massage  the muscles around the joint to relieve pain and stiffness  · Use a cane, crutches, or a walker  to protect and relieve pressure on your ankle, knee, and hip joints  You may also be prescribed shoe inserts to decrease pressure in your joints  · Wear flat or low-heeled shoes  This will help decrease pain and reduce pressure on your ankle, knee, and hip joints  Follow up with your healthcare provider as directed:  Write down your questions so you remember to ask them during your visits  © 2017 [address] Information is for End User's use only and may not be sold, redistributed or otherwise used for commercial purposes   All illustrations and images included in [employer] are the copyrighted property of [employer]  or Luis Enrique Mccarthy  The above information is an  only  It is not intended as medical advice for individual conditions or treatments  Talk to your doctor, nurse or pharmacist before following any medical regimen to see if it is safe and effective for you

## 2020-08-02 ENCOUNTER — OFFICE VISIT (OUTPATIENT)
Dept: URGENT CARE | Facility: CLINIC | Age: 60
End: 2020-08-02
Payer: COMMERCIAL

## 2020-08-02 VITALS
DIASTOLIC BLOOD PRESSURE: 84 MMHG | SYSTOLIC BLOOD PRESSURE: 150 MMHG | HEART RATE: 84 BPM | OXYGEN SATURATION: 97 % | RESPIRATION RATE: 16 BRPM | TEMPERATURE: 98.2 F

## 2020-08-02 DIAGNOSIS — M25.511 ACUTE PAIN OF RIGHT SHOULDER: Primary | ICD-10-CM

## 2020-08-02 DIAGNOSIS — M62.838 TRAPEZIUS MUSCLE SPASM: ICD-10-CM

## 2020-08-02 PROCEDURE — 99213 OFFICE O/P EST LOW 20 MIN: CPT | Performed by: NURSE PRACTITIONER

## 2020-08-02 RX ORDER — CYCLOBENZAPRINE HCL 10 MG
10 TABLET ORAL 3 TIMES DAILY PRN
Qty: 30 TABLET | Refills: 0 | Status: SHIPPED | OUTPATIENT
Start: 2020-08-02

## 2020-08-02 RX ORDER — IBUPROFEN 800 MG/1
800 TABLET ORAL EVERY 8 HOURS PRN
Qty: 40 TABLET | Refills: 0 | Status: SHIPPED | OUTPATIENT
Start: 2020-08-02

## 2020-08-02 NOTE — PATIENT INSTRUCTIONS
The right side of your trapezius muscle is spasmed, and the muscles in your arm are tight as well  Use ice/heat or just moist heat, use the ibuprofen prn (with food to decrease risk of stomach irritation) and use the muscle relaxer up to 3x/day as needed  The muscle relaxer, flexeril, can cause drowsiness, so take while home, not driving or operating machinery, until you know how it affects you  Muscle Spasm   WHAT YOU NEED TO KNOW:   A muscle spasm is a sudden contraction of any muscle or group of muscles  A muscle cramp is a painful muscle spasm  Muscle cramps commonly occur after intense exercise or during pregnancy  They may also be caused by certain medications, dehydration, low calcium or magnesium levels, or another medical condition  DISCHARGE INSTRUCTIONS:   Medicines: You may need the following:  · NSAIDs  help decrease swelling and pain or fever  This medicine is available with or without a doctor's order  NSAIDs can cause stomach bleeding or kidney problems in certain people  If you take blood thinner medicine, always ask your healthcare provider if NSAIDs are safe for you  Always read the medicine label and follow directions  · Take your medicine as directed  Contact your healthcare provider if you think your medicine is not helping or if you have side effects  Tell him of her if you are allergic to any medicine  Keep a list of the medicines, vitamins, and herbs you take  Include the amounts, and when and why you take them  Bring the list or the pill bottles to follow-up visits  Carry your medicine list with you in case of an emergency  Follow up with your healthcare provider as directed: You may need other tests or treatment  You may also be referred to a physical therapist or other specialist  Write down your questions so you remember to ask them during your visits  Self-care:   · Stretch  your muscle to help relieve the cramp   It may be helpful to keep your muscle in the stretched position until the cramp is gone  · Apply heat  to help decrease pain and muscle spasms  Apply heat on the area for 20 to 30 minutes every 2 hours for as many days as directed  · Apply ice  to help decrease swelling and pain  Ice may also help prevent tissue damage  Use an ice pack, or put crushed ice in a plastic bag  Cover it with a towel and place it on your muscle for 15 to 20 minutes every hour or as directed  · Drink more liquids  to help prevent muscle cramps caused by dehydration  Sports drinks may help replace electrolytes you lose through sweat during exercise  Ask your healthcare provider how much liquid to drink each day and which liquids are best for you  · Eat healthy foods , such as fruits, vegetables, whole grains, low-fat dairy products, and lean proteins (meat, beans, and fish)  If you are pregnant, ask your healthcare provider about foods that are high in magnesium and sodium  They may help to relieve cramps during pregnancy  · Massage your muscle  to help relieve the cramp  · Take frequent deep breaths  until the cramp feels better  Lie down while you take the deep breaths so you do not get dizzy or lightheaded  Contact your healthcare provider if:   · You have signs of dehydration, such as a headache, dark yellow urine, dry eyes or mouth, or a fast heartbeat  · You have questions or concerns about your condition or care  Return to the emergency department if:   · You have warmth, swelling, or redness in the cramping muscle  · You have frequent or unrelieved muscle cramps in several different muscles  · You have muscle cramps with numbness, tingling, and burning in your hands and feet  © 2017 [facility] Information is for End User's use only and may not be sold, redistributed or otherwise used for commercial purposes   All illustrations and images included in CareNotes® are the copyrighted property of A [employer] M [employer]  or [employer]  The above information is an  only  It is not intended as medical advice for individual conditions or treatments  Talk to your doctor, nurse or pharmacist before following any medical regimen to see if it is safe and effective for you  Shoulder Pain   AMBULATORY CARE:   Shoulder pain  is a common problem and can affect your daily activities  Pain can be caused by a problem within your shoulder  Shoulder pain may also be caused by pain that spreads to your shoulder from another part of your body  Seek care immediately if:   · You have severe pain  · You cannot move your arm or shoulder  · You have numbness or tingling in your shoulder or arm  Contact your healthcare provider if:   · Your pain gets worse or does not go away with treatment  · You have trouble moving your arm or shoulder  · You have questions or concerns about your condition or care  Treatment for shoulder pain  may include any of the following:  · Acetaminophen  decreases pain and fever  It is available without a doctor's order  Ask how much to take and how often to take it  Follow directions  Acetaminophen can cause liver damage if not taken correctly  · NSAIDs , such as ibuprofen, help decrease swelling, pain, and fever  This medicine is available with or without a doctor's order  NSAIDs can cause stomach bleeding or kidney problems in certain people  If you take blood thinner medicine, always ask your healthcare provider if NSAIDs are safe for you  Always read the medicine label and follow directions  · A steroid injection  may help decrease pain and swelling  · Surgery  may be needed for long-term pain and loss of function  Manage your symptoms:   · Apply ice  on your shoulder for 20 to 30 minutes every 2 hours or as directed  Use an ice pack, or put crushed ice in a plastic bag  Cover it with a towel  Ice helps prevent tissue damage and decreases swelling and pain      · Apply heat if ice does not help your symptoms  Apply heat on your shoulder for 20 to 30 minutes every 2 hours for as many days as directed  Heat helps decrease pain and muscle spasms  · Go to physical or occupational therapy as directed  A physical therapist teaches you exercises to help improve movement and strength, and to decrease pain  An occupational therapist teaches you skills to help with your daily activities  Prevent shoulder pain:   · Stretch and strengthen your shoulder  Use proper technique during exercises and sports  · Limit activities as directed  Try to avoid repeated overhead movements  Follow up with your healthcare provider or orthopedist as directed:  Write down your questions so you remember to ask them during your visits  © 2017 [address] [facility] Information is for End User's use only and may not be sold, redistributed or otherwise used for commercial purposes  All illustrations and images included in CareNotes® are the copyrighted property of [employer]  or Luis Enrique Mccarthy  The above information is an  only  It is not intended as medical advice for individual conditions or treatments  Talk to your doctor, nurse or pharmacist before following any medical regimen to see if it is safe and effective for you

## 2020-08-02 NOTE — PROGRESS NOTES
[address]        NAME: Jacob Fitzpatrick is a 62 y o  male  : 1960    MRN: [id number]  DATE: 2020  TIME: 9:41 AM    Assessment and Plan   Acute pain of right shoulder [M25 511]  1  Acute pain of right shoulder  cyclobenzaprine (FLEXERIL) 10 mg tablet    ibuprofen (MOTRIN) 800 mg tablet   2  Trapezius muscle spasm  cyclobenzaprine (FLEXERIL) 10 mg tablet    ibuprofen (MOTRIN) 800 mg tablet         Patient Instructions     Patient Instructions     The right side of your trapezius muscle is spasmed, and the muscles in your arm are tight as well  Use ice/heat or just moist heat, use the ibuprofen prn (with food to decrease risk of stomach irritation) and use the muscle relaxer up to 3x/day as needed  The muscle relaxer, flexeril, can cause drowsiness, so take while home, not driving or operating machinery, until you know how it affects you  Muscle Spasm   WHAT YOU NEED TO KNOW:   A muscle spasm is a sudden contraction of any muscle or group of muscles  A muscle cramp is a painful muscle spasm  Muscle cramps commonly occur after intense exercise or during pregnancy  They may also be caused by certain medications, dehydration, low calcium or magnesium levels, or another medical condition  DISCHARGE INSTRUCTIONS:   Medicines: You may need the following:  · NSAIDs  help decrease swelling and pain or fever  This medicine is available with or without a doctor's order  NSAIDs can cause stomach bleeding or kidney problems in certain people  If you take blood thinner medicine, always ask your healthcare provider if NSAIDs are safe for you  Always read the medicine label and follow directions  · Take your medicine as directed  Contact your healthcare provider if you think your medicine is not helping or if you have side effects  Tell him of her if you are allergic to any medicine  Keep a list of the medicines, vitamins, and herbs you take  Include the amounts, and when and why you take them   Bring the list or the pill bottles to follow-up visits  Carry your medicine list with you in case of an emergency  Follow up with your healthcare provider as directed: You may need other tests or treatment  You may also be referred to a physical therapist or other specialist  Write down your questions so you remember to ask them during your visits  Self-care:   · Stretch  your muscle to help relieve the cramp  It may be helpful to keep your muscle in the stretched position until the cramp is gone  · Apply heat  to help decrease pain and muscle spasms  Apply heat on the area for 20 to 30 minutes every 2 hours for as many days as directed  · Apply ice  to help decrease swelling and pain  Ice may also help prevent tissue damage  Use an ice pack, or put crushed ice in a plastic bag  Cover it with a towel and place it on your muscle for 15 to 20 minutes every hour or as directed  · Drink more liquids  to help prevent muscle cramps caused by dehydration  Sports drinks may help replace electrolytes you lose through sweat during exercise  Ask your healthcare provider how much liquid to drink each day and which liquids are best for you  · Eat healthy foods , such as fruits, vegetables, whole grains, low-fat dairy products, and lean proteins (meat, beans, and fish)  If you are pregnant, ask your healthcare provider about foods that are high in magnesium and sodium  They may help to relieve cramps during pregnancy  · Massage your muscle  to help relieve the cramp  · Take frequent deep breaths  until the cramp feels better  Lie down while you take the deep breaths so you do not get dizzy or lightheaded  Contact your healthcare provider if:   · You have signs of dehydration, such as a headache, dark yellow urine, dry eyes or mouth, or a fast heartbeat  · You have questions or concerns about your condition or care    Return to the emergency department if:   · You have warmth, swelling, or redness in the cramping muscle  · You have frequent or unrelieved muscle cramps in several different muscles  · You have muscle cramps with numbness, tingling, and burning in your hands and feet  © 2017 [employer] Information is for End User's use only and may not be sold, redistributed or otherwise used for commercial purposes  All illustrations and images included in CareNotes® are the copyrighted property of [employer]  or [employer]  The above information is an  only  It is not intended as medical advice for individual conditions or treatments  Talk to your doctor, nurse or pharmacist before following any medical regimen to see if it is safe and effective for you  Shoulder Pain   AMBULATORY CARE:   Shoulder pain  is a common problem and can affect your daily activities  Pain can be caused by a problem within your shoulder  Shoulder pain may also be caused by pain that spreads to your shoulder from another part of your body  Seek care immediately if:   · You have severe pain  · You cannot move your arm or shoulder  · You have numbness or tingling in your shoulder or arm  Contact your healthcare provider if:   · Your pain gets worse or does not go away with treatment  · You have trouble moving your arm or shoulder  · You have questions or concerns about your condition or care  Treatment for shoulder pain  may include any of the following:  · Acetaminophen  decreases pain and fever  It is available without a doctor's order  Ask how much to take and how often to take it  Follow directions  Acetaminophen can cause liver damage if not taken correctly  · NSAIDs , such as ibuprofen, help decrease swelling, pain, and fever  This medicine is available with or without a doctor's order  NSAIDs can cause stomach bleeding or kidney problems in certain people  If you take blood thinner medicine, always ask your healthcare provider if NSAIDs are safe for you  Always read the medicine label and follow directions  · A steroid injection  may help decrease pain and swelling  · Surgery  may be needed for long-term pain and loss of function  Manage your symptoms:   · Apply ice  on your shoulder for 20 to 30 minutes every 2 hours or as directed  Use an ice pack, or put crushed ice in a plastic bag  Cover it with a towel  Ice helps prevent tissue damage and decreases swelling and pain  · Apply heat if ice does not help your symptoms  Apply heat on your shoulder for 20 to 30 minutes every 2 hours for as many days as directed  Heat helps decrease pain and muscle spasms  · Go to physical or occupational therapy as directed  A physical therapist teaches you exercises to help improve movement and strength, and to decrease pain  An occupational therapist teaches you skills to help with your daily activities  Prevent shoulder pain:   · Stretch and strengthen your shoulder  Use proper technique during exercises and sports  · Limit activities as directed  Try to avoid repeated overhead movements  Follow up with your healthcare provider or orthopedist as directed:  Write down your questions so you remember to ask them during your visits  © 2017 [address] [facility] Information is for End User's use only and may not be sold, redistributed or otherwise used for commercial purposes  All illustrations and images included in CareNotes® are the copyrighted property of [employer]  or [employer]  The above information is an  only  It is not intended as medical advice for individual conditions or treatments  Talk to your doctor, nurse or pharmacist before following any medical regimen to see if it is safe and effective for you  Follow up with PCP in 3-5 days  Proceed to  ER if symptoms worsen  Chief Complaint     Chief Complaint   Patient presents with    Shoulder Pain     Pt c/o right shoulder pain for a week  History of Present Illness       Patient presents reporting pain across top of his right shoulder and down his right arm x1 week  He states that he was reaching overhead working on some pipe fittings and pulling at things up arch which darryl his shoulder  He states he has been taking ibuprofen 800 mg every 8 hours with slight improvement  He also used Aspercreme several times which seemed to help the 1st day but has not seem to make much difference  He shows me how the muscle on the top of his shoulder is very tight as well as several muscles down his arm are tight  When asked about the prednisone taper I prescribed him this past January to help with hip pain, he states that he tolerated it fine but that did not seem to help much  Asks about muscle relaxers, stating that few friends have told him that would likely help this muscle pain  He himself has not tried any previously to know if he has a preference between the several options  Review of Systems   Review of Systems   Musculoskeletal: Positive for arthralgias and myalgias  All other systems reviewed and are negative          Current Medications       Current Outpatient Medications:     aspirin 81 MG tablet, Take 1 tablet (81 mg total) by mouth daily, Disp: , Rfl:     atorvastatin (LIPITOR) 80 mg tablet, Take 80 mg by mouth daily, Disp: , Rfl:     cyclobenzaprine (FLEXERIL) 10 mg tablet, Take 1 tablet (10 mg total) by mouth 3 (three) times a day as needed for muscle spasms, Disp: 30 tablet, Rfl: 0    ibuprofen (MOTRIN) 800 mg tablet, Take 1 tablet (800 mg total) by mouth every 8 (eight) hours as needed for mild pain, moderate pain, fever or headaches, Disp: 40 tablet, Rfl: 0    lisinopril (ZESTRIL) 5 mg tablet, Take 1 tablet by mouth daily , Disp: , Rfl:     metFORMIN (GLUCOPHAGE) 500 mg tablet, daily Take 2 tablets AM and 1 tablet PM, Disp: , Rfl:     metoprolol tartrate (LOPRESSOR) 25 mg tablet, Take 1 tablet (25 mg total) by mouth 2 (two) times a day, Disp: 180 tablet, Rfl: 5    nitroglycerin (NITROSTAT) 0 4 mg SL tablet, Place 1 tablet under the tongue as needed, Disp: , Rfl:     predniSONE 10 mg tablet, 3 tabs BID x 4 days, 2 tabs BID x 3 days, 1 tab BID x 3 days, Disp: 42 tablet, Rfl: 0    traMADol (ULTRAM) 50 mg tablet, 50 mg as needed, Disp: , Rfl:     Current Allergies     Allergies as of 08/02/2020    (No Known Allergies)            The following portions of the patient's history were reviewed and updated as appropriate: allergies, current medications, past family history, past medical history, past social history, past surgical history and problem list      Past Medical History:   Diagnosis Date    CAD (coronary artery disease)     s/p CABG x 2 LIMA-LAD, VG- OM1 8/5/2013    Carotid duplex 08/05/2013    20-49% bilateral stenosis    Diabetes ([facility] [address] )     History of echocardiogram 12/22/2016    EF 55% Mild LVH  Mild MR    Hyperlipidemia     Hypertension        Past Surgical History:   Procedure Laterality Date    CARDIAC CATHETERIZATION  08/02/2013    EF 50% Severe left main disease 90%, OM1 99%, 100% RCA (bypass scheduled)    CORONARY ARTERY BYPASS GRAFT  08/05/2013    CABG X2 LIMA-LAD, VG-OM1       No family history on file  Medications have been verified  Objective   /84   Pulse 84   Temp 98 2 °F (36 8 °C)   Resp 16   SpO2 97%        Physical Exam     Physical Exam   Constitutional: He is oriented to person, place, and time  He appears well-developed  He does not appear ill  No distress  HENT:   Head: Normocephalic and atraumatic  Neck: Normal range of motion  Neck supple  Pulmonary/Chest: Effort normal  No respiratory distress  Musculoskeletal: Normal range of motion  Right shoulder: He exhibits tenderness, pain and spasm  He exhibits normal range of motion, no bony tenderness, no swelling, no effusion, no crepitus, no deformity, no laceration, normal pulse and normal strength  Right elbow: Normal      Right wrist: He exhibits tenderness  He exhibits normal range of motion, no bony tenderness, no swelling, no effusion, no crepitus, no deformity and no laceration  Left upper arm: He exhibits tenderness  He exhibits no bony tenderness, no swelling, no edema, no deformity and no laceration  Left forearm: He exhibits tenderness  He exhibits no bony tenderness, no swelling, no edema, no deformity and no laceration  Right hand: Normal    Neurological: He is alert and oriented to person, place, and time  Skin: Skin is warm and dry  Capillary refill takes less than 2 seconds  He is not diaphoretic  Psychiatric: His speech is normal and behavior is normal  Mood, memory, judgment and thought content normal    Nursing note and vitals reviewed

## 2020-08-03 ENCOUNTER — TELEPHONE (OUTPATIENT)
Dept: URGENT CARE | Facility: CLINIC | Age: 60
End: 2020-08-03

## 2020-08-03 DIAGNOSIS — M79.605 LEFT LEG PAIN: ICD-10-CM

## 2020-08-03 DIAGNOSIS — M89.8X9 DEGENERATIVE DISORDER OF BONE: ICD-10-CM

## 2020-08-03 DIAGNOSIS — M79.601 RIGHT ARM PAIN: ICD-10-CM

## 2020-08-03 DIAGNOSIS — M16.12 ARTHRITIS OF LEFT HIP: ICD-10-CM

## 2020-08-03 DIAGNOSIS — M62.838 TRAPEZIUS MUSCLE SPASM: Primary | ICD-10-CM

## 2020-08-03 RX ORDER — PREDNISONE 10 MG/1
TABLET ORAL
Qty: 42 TABLET | Refills: 0 | Status: SHIPPED | OUTPATIENT
Start: 2020-08-03 | End: 2020-12-03

## 2020-08-03 NOTE — TELEPHONE ENCOUNTER
Patient called, stating that he has been taking the flexeril and the ibuprofen, but it does not seem to be helping  He states the muscle spasm in his forearm seems a bit worse today  While he did not think the prednisone helped much last January, he is open to trying it to hopefully provide relief in combination with the other medications  Discussed that if this still does not relieve symptoms he may need to follow-up with his PCP, Dr Canales Blind

## 2020-08-12 ENCOUNTER — HOSPITAL ENCOUNTER (INPATIENT)
Facility: HOSPITAL | Age: 60
LOS: 3 days | Discharge: HOME | DRG: 501 | End: 2020-08-15
Attending: SURGERY | Admitting: SURGERY
Payer: COMMERCIAL

## 2020-08-12 ENCOUNTER — ANESTHESIA EVENT (EMERGENCY)
Dept: PERIOP | Facility: HOSPITAL | Age: 60
DRG: 501 | End: 2020-08-12
Payer: COMMERCIAL

## 2020-08-12 ENCOUNTER — HOSPITAL ENCOUNTER (EMERGENCY)
Facility: HOSPITAL | Age: 60
End: 2020-08-12
Attending: EMERGENCY MEDICINE | Admitting: EMERGENCY MEDICINE
Payer: COMMERCIAL

## 2020-08-12 ENCOUNTER — ANESTHESIA (EMERGENCY)
Dept: PERIOP | Facility: HOSPITAL | Age: 60
DRG: 501 | End: 2020-08-12
Payer: COMMERCIAL

## 2020-08-12 ENCOUNTER — APPOINTMENT (EMERGENCY)
Dept: CT IMAGING | Facility: HOSPITAL | Age: 60
End: 2020-08-12
Payer: COMMERCIAL

## 2020-08-12 VITALS
SYSTOLIC BLOOD PRESSURE: 178 MMHG | DIASTOLIC BLOOD PRESSURE: 87 MMHG | HEART RATE: 116 BPM | RESPIRATION RATE: 16 BRPM | WEIGHT: 315 LBS | BODY MASS INDEX: 53.25 KG/M2 | OXYGEN SATURATION: 97 % | TEMPERATURE: 97.4 F

## 2020-08-12 DIAGNOSIS — E11.9 TYPE 2 DIABETES MELLITUS WITHOUT COMPLICATION, WITHOUT LONG-TERM CURRENT USE OF INSULIN (HCC): ICD-10-CM

## 2020-08-12 DIAGNOSIS — M72.6 NECROTIZING FASCIITIS (HCC): Primary | ICD-10-CM

## 2020-08-12 PROBLEM — I25.810 CORONARY ARTERY DISEASE INVOLVING CORONARY BYPASS GRAFT: Status: ACTIVE | Noted: 2020-08-12

## 2020-08-12 LAB
ABO GROUP BLD: NORMAL
ABO GROUP BLD: NORMAL
ALBUMIN SERPL BCP-MCNC: 4.3 G/DL (ref 3.5–5.7)
ALP SERPL-CCNC: 91 U/L (ref 40–150)
ALT SERPL W P-5'-P-CCNC: 22 U/L (ref 7–52)
ANION GAP SERPL CALCULATED.3IONS-SCNC: 18 MMOL/L (ref 4–13)
APTT PPP: 23 SECONDS (ref 23–37)
AST SERPL W P-5'-P-CCNC: 13 U/L (ref 13–39)
BACTERIA UR QL AUTO: ABNORMAL /HPF
BASOPHILS # BLD AUTO: 0.1 THOUSANDS/ΜL (ref 0–0.1)
BASOPHILS NFR BLD AUTO: 0 % (ref 0–2)
BILIRUB SERPL-MCNC: 1.2 MG/DL (ref 0.2–1)
BILIRUB UR QL STRIP: ABNORMAL
BLD GP AB SCN SERPL QL: NEGATIVE
BUN SERPL-MCNC: 21 MG/DL (ref 7–25)
CALCIUM SERPL-MCNC: 9.8 MG/DL (ref 8.6–10.5)
CHLORIDE SERPL-SCNC: 94 MMOL/L (ref 98–107)
CLARITY UR: CLEAR
CO2 SERPL-SCNC: 18 MMOL/L (ref 21–31)
COLOR UR: YELLOW
CREAT SERPL-MCNC: 0.86 MG/DL (ref 0.7–1.3)
EOSINOPHIL # BLD AUTO: 0.1 THOUSAND/ΜL (ref 0–0.61)
EOSINOPHIL NFR BLD AUTO: 0 % (ref 0–5)
ERYTHROCYTE [DISTWIDTH] IN BLOOD BY AUTOMATED COUNT: 13.5 % (ref 11.5–14.5)
FINE GRAN CASTS URNS QL MICRO: ABNORMAL /LPF
GFR SERPL CREATININE-BSD FRML MDRD: 95 ML/MIN/1.73SQ M
GLUCOSE SERPL-MCNC: 235 MG/DL (ref 65–140)
GLUCOSE SERPL-MCNC: 378 MG/DL (ref 65–99)
GLUCOSE UR STRIP-MCNC: ABNORMAL MG/DL
HCT VFR BLD AUTO: 46.7 % (ref 42–47)
HGB BLD-MCNC: 15.8 G/DL (ref 14–18)
HGB UR QL STRIP.AUTO: ABNORMAL
INR PPP: 1.02 (ref 0.84–1.19)
KETONES UR STRIP-MCNC: ABNORMAL MG/DL
LACTATE SERPL-SCNC: 1.4 MMOL/L (ref 0.5–2)
LACTATE SERPL-SCNC: 2.3 MMOL/L (ref 0.5–2)
LEUKOCYTE ESTERASE UR QL STRIP: NEGATIVE
LYMPHOCYTES # BLD AUTO: 1.9 THOUSANDS/ΜL (ref 0.6–4.47)
LYMPHOCYTES NFR BLD AUTO: 11 % (ref 21–51)
MCH RBC QN AUTO: 29 PG (ref 26–34)
MCHC RBC AUTO-ENTMCNC: 33.8 G/DL (ref 31–37)
MCV RBC AUTO: 86 FL (ref 81–99)
MONOCYTES # BLD AUTO: 1.5 THOUSAND/ΜL (ref 0.17–1.22)
MONOCYTES NFR BLD AUTO: 9 % (ref 2–12)
MUCOUS THREADS UR QL AUTO: ABNORMAL
NEUTROPHILS # BLD AUTO: 14.1 THOUSANDS/ΜL (ref 1.4–6.5)
NEUTS SEG NFR BLD AUTO: 80 % (ref 42–75)
NITRITE UR QL STRIP: NEGATIVE
NON-SQ EPI CELLS URNS QL MICRO: ABNORMAL /HPF
OTHER STN SPEC: ABNORMAL
PH UR STRIP.AUTO: 5.5 [PH]
PLATELET # BLD AUTO: 318 THOUSANDS/UL (ref 149–390)
PMV BLD AUTO: 9.7 FL (ref 8.6–11.7)
POTASSIUM SERPL-SCNC: 4.1 MMOL/L (ref 3.5–5.5)
PROT SERPL-MCNC: 7.6 G/DL (ref 6.4–8.9)
PROT UR STRIP-MCNC: ABNORMAL MG/DL
PROTHROMBIN TIME: 13.3 SECONDS (ref 11.6–14.5)
RBC # BLD AUTO: 5.44 MILLION/UL (ref 4.3–5.9)
RBC #/AREA URNS AUTO: ABNORMAL /HPF
RH BLD: POSITIVE
RH BLD: POSITIVE
SARS-COV-2 RNA RESP QL NAA+PROBE: NEGATIVE
SODIUM SERPL-SCNC: 130 MMOL/L (ref 134–143)
SP GR UR STRIP.AUTO: 1.02 (ref 1–1.03)
SPECIMEN EXPIRATION DATE: NORMAL
TROPONIN I SERPL-MCNC: <0.03 NG/ML
UROBILINOGEN UR QL STRIP.AUTO: 0.2 E.U./DL
WBC # BLD AUTO: 17.7 THOUSAND/UL (ref 4.8–10.8)
WBC #/AREA URNS AUTO: ABNORMAL /HPF

## 2020-08-12 PROCEDURE — 36415 COLL VENOUS BLD VENIPUNCTURE: CPT | Performed by: EMERGENCY MEDICINE

## 2020-08-12 PROCEDURE — 87070 CULTURE OTHR SPECIMN AEROBIC: CPT | Performed by: SURGERY

## 2020-08-12 PROCEDURE — 96365 THER/PROPH/DIAG IV INF INIT: CPT

## 2020-08-12 PROCEDURE — 86901 BLOOD TYPING SEROLOGIC RH(D): CPT | Performed by: STUDENT IN AN ORGANIZED HEALTH CARE EDUCATION/TRAINING PROGRAM

## 2020-08-12 PROCEDURE — 0J980ZZ DRAINAGE OF ABDOMEN SUBCUTANEOUS TISSUE AND FASCIA, OPEN APPROACH: ICD-10-PCS | Performed by: SURGERY

## 2020-08-12 PROCEDURE — 99284 EMERGENCY DEPT VISIT MOD MDM: CPT

## 2020-08-12 PROCEDURE — 96375 TX/PRO/DX INJ NEW DRUG ADDON: CPT

## 2020-08-12 PROCEDURE — U0003 INFECTIOUS AGENT DETECTION BY NUCLEIC ACID (DNA OR RNA); SEVERE ACUTE RESPIRATORY SYNDROME CORONAVIRUS 2 (SARS-COV-2) (CORONAVIRUS DISEASE [COVID-19]), AMPLIFIED PROBE TECHNIQUE, MAKING USE OF HIGH THROUGHPUT TECHNOLOGIES AS DESCRIBED BY CMS-2020-01-R: HCPCS | Performed by: STUDENT IN AN ORGANIZED HEALTH CARE EDUCATION/TRAINING PROGRAM

## 2020-08-12 PROCEDURE — G1004 CDSM NDSC: HCPCS

## 2020-08-12 PROCEDURE — 86850 RBC ANTIBODY SCREEN: CPT | Performed by: STUDENT IN AN ORGANIZED HEALTH CARE EDUCATION/TRAINING PROGRAM

## 2020-08-12 PROCEDURE — 82948 REAGENT STRIP/BLOOD GLUCOSE: CPT

## 2020-08-12 PROCEDURE — 87176 TISSUE HOMOGENIZATION CULTR: CPT | Performed by: SURGERY

## 2020-08-12 PROCEDURE — 99223 1ST HOSP IP/OBS HIGH 75: CPT | Performed by: SURGERY

## 2020-08-12 PROCEDURE — 81001 URINALYSIS AUTO W/SCOPE: CPT | Performed by: EMERGENCY MEDICINE

## 2020-08-12 PROCEDURE — 87075 CULTR BACTERIA EXCEPT BLOOD: CPT | Performed by: SURGERY

## 2020-08-12 PROCEDURE — 87077 CULTURE AEROBIC IDENTIFY: CPT | Performed by: EMERGENCY MEDICINE

## 2020-08-12 PROCEDURE — 86900 BLOOD TYPING SEROLOGIC ABO: CPT | Performed by: STUDENT IN AN ORGANIZED HEALTH CARE EDUCATION/TRAINING PROGRAM

## 2020-08-12 PROCEDURE — 80053 COMPREHEN METABOLIC PANEL: CPT | Performed by: EMERGENCY MEDICINE

## 2020-08-12 PROCEDURE — 99285 EMERGENCY DEPT VISIT HI MDM: CPT | Performed by: EMERGENCY MEDICINE

## 2020-08-12 PROCEDURE — 93005 ELECTROCARDIOGRAM TRACING: CPT

## 2020-08-12 PROCEDURE — 85730 THROMBOPLASTIN TIME PARTIAL: CPT | Performed by: EMERGENCY MEDICINE

## 2020-08-12 PROCEDURE — 83605 ASSAY OF LACTIC ACID: CPT | Performed by: EMERGENCY MEDICINE

## 2020-08-12 PROCEDURE — 85610 PROTHROMBIN TIME: CPT | Performed by: EMERGENCY MEDICINE

## 2020-08-12 PROCEDURE — 87040 BLOOD CULTURE FOR BACTERIA: CPT | Performed by: EMERGENCY MEDICINE

## 2020-08-12 PROCEDURE — 87147 CULTURE TYPE IMMUNOLOGIC: CPT | Performed by: SURGERY

## 2020-08-12 PROCEDURE — 11005 DBRDMT SKIN ABDOMINAL WALL: CPT | Performed by: SURGERY

## 2020-08-12 PROCEDURE — 74177 CT ABD & PELVIS W/CONTRAST: CPT

## 2020-08-12 PROCEDURE — 85025 COMPLETE CBC W/AUTO DIFF WBC: CPT | Performed by: EMERGENCY MEDICINE

## 2020-08-12 PROCEDURE — 87205 SMEAR GRAM STAIN: CPT | Performed by: SURGERY

## 2020-08-12 PROCEDURE — 96367 TX/PROPH/DG ADDL SEQ IV INF: CPT

## 2020-08-12 PROCEDURE — 87086 URINE CULTURE/COLONY COUNT: CPT | Performed by: EMERGENCY MEDICINE

## 2020-08-12 PROCEDURE — 84484 ASSAY OF TROPONIN QUANT: CPT | Performed by: EMERGENCY MEDICINE

## 2020-08-12 RX ORDER — ALBUMIN, HUMAN INJ 5% 5 %
SOLUTION INTRAVENOUS CONTINUOUS PRN
Status: DISCONTINUED | OUTPATIENT
Start: 2020-08-12 | End: 2020-08-12

## 2020-08-12 RX ORDER — CLINDAMYCIN PHOSPHATE 600 MG/50ML
600 INJECTION INTRAVENOUS ONCE
Status: COMPLETED | OUTPATIENT
Start: 2020-08-12 | End: 2020-08-12

## 2020-08-12 RX ORDER — ALBUTEROL SULFATE 2.5 MG/3ML
2.5 SOLUTION RESPIRATORY (INHALATION) ONCE AS NEEDED
Status: DISCONTINUED | OUTPATIENT
Start: 2020-08-12 | End: 2020-08-13 | Stop reason: HOSPADM

## 2020-08-12 RX ORDER — CLINDAMYCIN PHOSPHATE 900 MG/50ML
900 INJECTION INTRAVENOUS EVERY 8 HOURS
Status: DISCONTINUED | OUTPATIENT
Start: 2020-08-12 | End: 2020-08-13

## 2020-08-12 RX ORDER — FENTANYL CITRATE 50 UG/ML
INJECTION, SOLUTION INTRAMUSCULAR; INTRAVENOUS AS NEEDED
Status: DISCONTINUED | OUTPATIENT
Start: 2020-08-12 | End: 2020-08-12

## 2020-08-12 RX ORDER — ESMOLOL HYDROCHLORIDE 10 MG/ML
INJECTION INTRAVENOUS AS NEEDED
Status: DISCONTINUED | OUTPATIENT
Start: 2020-08-12 | End: 2020-08-12

## 2020-08-12 RX ORDER — VANCOMYCIN HYDROCHLORIDE 1 G/20ML
INJECTION, POWDER, LYOPHILIZED, FOR SOLUTION INTRAVENOUS AS NEEDED
Status: DISCONTINUED | OUTPATIENT
Start: 2020-08-12 | End: 2020-08-12

## 2020-08-12 RX ORDER — SUCCINYLCHOLINE/SOD CL,ISO/PF 100 MG/5ML
SYRINGE (ML) INTRAVENOUS AS NEEDED
Status: DISCONTINUED | OUTPATIENT
Start: 2020-08-12 | End: 2020-08-12

## 2020-08-12 RX ORDER — PROMETHAZINE HYDROCHLORIDE 25 MG/ML
6.25 INJECTION, SOLUTION INTRAMUSCULAR; INTRAVENOUS ONCE AS NEEDED
Status: DISCONTINUED | OUTPATIENT
Start: 2020-08-12 | End: 2020-08-13 | Stop reason: HOSPADM

## 2020-08-12 RX ORDER — PROPOFOL 10 MG/ML
INJECTION, EMULSION INTRAVENOUS AS NEEDED
Status: DISCONTINUED | OUTPATIENT
Start: 2020-08-12 | End: 2020-08-12

## 2020-08-12 RX ORDER — SODIUM CHLORIDE, SODIUM LACTATE, POTASSIUM CHLORIDE, CALCIUM CHLORIDE 600; 310; 30; 20 MG/100ML; MG/100ML; MG/100ML; MG/100ML
125 INJECTION, SOLUTION INTRAVENOUS CONTINUOUS
Status: DISCONTINUED | OUTPATIENT
Start: 2020-08-13 | End: 2020-08-12 | Stop reason: SDUPTHER

## 2020-08-12 RX ORDER — FENTANYL CITRATE/PF 50 MCG/ML
25 SYRINGE (ML) INJECTION
Status: DISCONTINUED | OUTPATIENT
Start: 2020-08-12 | End: 2020-08-13 | Stop reason: HOSPADM

## 2020-08-12 RX ORDER — METOPROLOL TARTRATE 5 MG/5ML
INJECTION INTRAVENOUS AS NEEDED
Status: DISCONTINUED | OUTPATIENT
Start: 2020-08-12 | End: 2020-08-12

## 2020-08-12 RX ORDER — HYDROMORPHONE HCL/PF 1 MG/ML
0.5 SYRINGE (ML) INJECTION
Status: DISCONTINUED | OUTPATIENT
Start: 2020-08-12 | End: 2020-08-15

## 2020-08-12 RX ORDER — HYDROMORPHONE HCL/PF 1 MG/ML
1 SYRINGE (ML) INJECTION
Status: DISCONTINUED | OUTPATIENT
Start: 2020-08-12 | End: 2020-08-15

## 2020-08-12 RX ORDER — ONDANSETRON 2 MG/ML
4 INJECTION INTRAMUSCULAR; INTRAVENOUS EVERY 6 HOURS PRN
Status: DISCONTINUED | OUTPATIENT
Start: 2020-08-12 | End: 2020-08-15 | Stop reason: HOSPADM

## 2020-08-12 RX ORDER — HEPARIN SODIUM 5000 [USP'U]/ML
7500 INJECTION, SOLUTION INTRAVENOUS; SUBCUTANEOUS EVERY 8 HOURS SCHEDULED
Status: DISCONTINUED | OUTPATIENT
Start: 2020-08-13 | End: 2020-08-15 | Stop reason: HOSPADM

## 2020-08-12 RX ORDER — MAGNESIUM HYDROXIDE 1200 MG/15ML
LIQUID ORAL AS NEEDED
Status: DISCONTINUED | OUTPATIENT
Start: 2020-08-12 | End: 2020-08-12 | Stop reason: HOSPADM

## 2020-08-12 RX ORDER — SODIUM CHLORIDE 9 MG/ML
INJECTION, SOLUTION INTRAVENOUS CONTINUOUS PRN
Status: DISCONTINUED | OUTPATIENT
Start: 2020-08-12 | End: 2020-08-12

## 2020-08-12 RX ORDER — HYDROMORPHONE HCL/PF 1 MG/ML
0.25 SYRINGE (ML) INJECTION
Status: DISCONTINUED | OUTPATIENT
Start: 2020-08-12 | End: 2020-08-13 | Stop reason: HOSPADM

## 2020-08-12 RX ORDER — HYDROMORPHONE HCL/PF 1 MG/ML
0.2 SYRINGE (ML) INJECTION
Status: DISCONTINUED | OUTPATIENT
Start: 2020-08-12 | End: 2020-08-15

## 2020-08-12 RX ORDER — ONDANSETRON 2 MG/ML
4 INJECTION INTRAMUSCULAR; INTRAVENOUS ONCE AS NEEDED
Status: DISCONTINUED | OUTPATIENT
Start: 2020-08-12 | End: 2020-08-13 | Stop reason: HOSPADM

## 2020-08-12 RX ORDER — SODIUM CHLORIDE, SODIUM LACTATE, POTASSIUM CHLORIDE, CALCIUM CHLORIDE 600; 310; 30; 20 MG/100ML; MG/100ML; MG/100ML; MG/100ML
125 INJECTION, SOLUTION INTRAVENOUS CONTINUOUS
Status: DISCONTINUED | OUTPATIENT
Start: 2020-08-12 | End: 2020-08-14

## 2020-08-12 RX ORDER — LIDOCAINE HYDROCHLORIDE 10 MG/ML
INJECTION, SOLUTION EPIDURAL; INFILTRATION; INTRACAUDAL; PERINEURAL AS NEEDED
Status: DISCONTINUED | OUTPATIENT
Start: 2020-08-12 | End: 2020-08-12

## 2020-08-12 RX ADMIN — METOPROLOL TARTRATE 2.5 MG: 5 INJECTION, SOLUTION INTRAVENOUS at 22:16

## 2020-08-12 RX ADMIN — FENTANYL CITRATE 50 MCG: 50 INJECTION, SOLUTION INTRAMUSCULAR; INTRAVENOUS at 22:16

## 2020-08-12 RX ADMIN — Medication 25 MCG: at 23:39

## 2020-08-12 RX ADMIN — PHENYLEPHRINE HYDROCHLORIDE 100 MCG: 10 INJECTION INTRAVENOUS at 22:04

## 2020-08-12 RX ADMIN — VANCOMYCIN HYDROCHLORIDE 1 G: 1 INJECTION, POWDER, LYOPHILIZED, FOR SOLUTION INTRAVENOUS at 22:30

## 2020-08-12 RX ADMIN — CLINDAMYCIN IN 5 PERCENT DEXTROSE 600 MG: 12 INJECTION, SOLUTION INTRAVENOUS at 17:00

## 2020-08-12 RX ADMIN — ONDANSETRON 4 MG: 2 INJECTION INTRAMUSCULAR; INTRAVENOUS at 23:03

## 2020-08-12 RX ADMIN — SODIUM CHLORIDE, SODIUM LACTATE, POTASSIUM CHLORIDE, AND CALCIUM CHLORIDE 125 ML/HR: .6; .31; .03; .02 INJECTION, SOLUTION INTRAVENOUS at 23:44

## 2020-08-12 RX ADMIN — SODIUM CHLORIDE 1000 ML: 0.9 INJECTION, SOLUTION INTRAVENOUS at 17:43

## 2020-08-12 RX ADMIN — CLINDAMYCIN PHOSPHATE 900 MG: 900 INJECTION, SOLUTION INTRAVENOUS at 22:07

## 2020-08-12 RX ADMIN — INSULIN HUMAN 5 UNITS: 100 INJECTION, SOLUTION PARENTERAL at 22:04

## 2020-08-12 RX ADMIN — LIDOCAINE HYDROCHLORIDE 50 MG: 10 INJECTION, SOLUTION EPIDURAL; INFILTRATION; INTRACAUDAL; PERINEURAL at 22:04

## 2020-08-12 RX ADMIN — VANCOMYCIN HYDROCHLORIDE 1500 MG: 1 INJECTION, POWDER, LYOPHILIZED, FOR SOLUTION INTRAVENOUS at 17:34

## 2020-08-12 RX ADMIN — PROPOFOL 300 MG: 10 INJECTION, EMULSION INTRAVENOUS at 22:04

## 2020-08-12 RX ADMIN — SODIUM CHLORIDE: 0.9 INJECTION, SOLUTION INTRAVENOUS at 21:54

## 2020-08-12 RX ADMIN — Medication 200 MG: at 22:04

## 2020-08-12 RX ADMIN — Medication 25 MCG: at 23:45

## 2020-08-12 RX ADMIN — IOHEXOL 100 ML: 350 INJECTION, SOLUTION INTRAVENOUS at 16:40

## 2020-08-12 RX ADMIN — INSULIN HUMAN 10 UNITS: 100 INJECTION, SOLUTION PARENTERAL at 23:40

## 2020-08-12 RX ADMIN — ALBUMIN (HUMAN): 12.5 SOLUTION INTRAVENOUS at 22:07

## 2020-08-12 RX ADMIN — PIPERACILLIN SODIUM AND TAZOBACTAM SODIUM 3.38 G: 3; .375 INJECTION, POWDER, LYOPHILIZED, FOR SOLUTION INTRAVENOUS at 17:04

## 2020-08-12 RX ADMIN — FENTANYL CITRATE 50 MCG: 50 INJECTION, SOLUTION INTRAMUSCULAR; INTRAVENOUS at 22:04

## 2020-08-12 RX ADMIN — METRONIDAZOLE 500 MG: 500 INJECTION, SOLUTION INTRAVENOUS at 22:14

## 2020-08-12 RX ADMIN — METOPROLOL TARTRATE 2.5 MG: 5 INJECTION, SOLUTION INTRAVENOUS at 22:37

## 2020-08-12 RX ADMIN — SODIUM CHLORIDE 1000 ML: 0.9 INJECTION, SOLUTION INTRAVENOUS at 16:47

## 2020-08-12 RX ADMIN — ESMOLOL HYDROCHLORIDE 20 MG: 100 INJECTION, SOLUTION INTRAVENOUS at 22:06

## 2020-08-12 NOTE — H&P
H&P Exam - General Surgery   Aryan Reynoso 62 y o  male MRN: [id number]  Unit/Bed#: ED 29 Encounter: [id number]    Assessment/Plan     Assessment:  Patient is 52-year-old male with concerns for necrotizing soft tissue infection of his right groin/pannus    Plan: Will proceed to the OR immediately for excisional debridement of his right groin/pannus  Will obtain intraoperative culture  Cefepime/vancomycin/Flagylclindamycin  Will admit to the ICU postoperatively for close monitoring    History of Present Illness     HPI:  Aryan Reynoso is a 62 y o  male who presents with pmh diabetes, BMI 48, hyperlipidemia, coronary artery disease status post CABG he says he noticed a boil that involved in his right groin yesterday evening  He said he had his significant other "pop" the boil yesterday evening, and since that time he has had excruciating pain in the area, as well as redness  He has not been febrile at home, but says he feels warm now  He has no significant history of developing abscesses  [place] [address], CT abdomen pelvis shows air in the soft tissues of his right groin  Review of Systems   Constitutional: Positive for fever  Negative for activity change, appetite change and chills  HENT: Negative  Eyes: Negative  Respiratory: Negative for chest tightness and shortness of breath  Cardiovascular: Negative  Gastrointestinal: Negative for abdominal distention and abdominal pain  Endocrine: Negative  Genitourinary: Negative  Musculoskeletal: Negative  Skin: Positive for color change  Neurological: Negative  Psychiatric/Behavioral: Negative  Historical Information   Past Medical History:   Diagnosis Date    CAD (coronary artery disease)     s/p CABG x 2 LIMA-LAD, VG- OM1 8/5/2013    Carotid duplex 08/05/2013    20-49% bilateral stenosis    Diabetes ([facility] [address] )     History of echocardiogram 12/22/2016    EF 55% Mild LVH   Mild MR    Hyperlipidemia     Hypertension      Past Surgical History:   Procedure Laterality Date    CARDIAC CATHETERIZATION  08/02/2013    EF 50% Severe left main disease 90%, OM1 99%, 100% RCA (bypass scheduled)    CORONARY ARTERY BYPASS GRAFT  08/05/2013    CABG X2 LIMA-LAD, VG-OM1     Social History   Social History     Substance and Sexual Activity   Alcohol Use Not Currently     Social History     Substance and Sexual Activity   Drug Use Not Currently     Social History     Tobacco Use   Smoking Status Former Smoker   Smokeless Tobacco Former User     E-Cigarette/Vaping    E-Cigarette Use Never User      E-Cigarette/Vaping Substances     Family History: non-contributory    Meds/Allergies   all medications and allergies reviewed  No Known Allergies    Objective   First Vitals:   Blood Pressure: 148/70 (08/12/20 1921)  Pulse: (!) 117 (08/12/20 1921)  Temperature: (!) 97 4 °F (36 3 °C) (08/12/20 1921)  Temp Source: Oral (08/12/20 1921)  Respirations: 18 (08/12/20 1921)  Height: 5' 4" (162 6 cm) (08/12/20 1921)  Weight - Scale: (!) 145 kg (320 lb) (08/12/20 1921)  SpO2: 95 % (08/12/20 1921)    Current Vitals:   Blood Pressure: 148/70 (08/12/20 1921)  Pulse: (!) 117 (08/12/20 1921)  Temperature: (!) 97 4 °F (36 3 °C) (08/12/20 1921)  Temp Source: Oral (08/12/20 1921)  Respirations: 18 (08/12/20 1921)  Height: 5' 4" (162 6 cm) (08/12/20 1921)  Weight - Scale: (!) 145 kg (320 lb) (08/12/20 1921)  SpO2: 95 % (08/12/20 1923)    No intake or output data in the 24 hours ending 08/12/20 1951    Invasive Devices     Peripheral Intravenous Line            Peripheral IV 08/12/20 Left Forearm less than 1 day                Physical Exam  Constitutional:       General: He is not in acute distress  Appearance: He is not ill-appearing  HENT:      Head: Normocephalic and atraumatic  Mouth/Throat:      Mouth: Mucous membranes are moist    Eyes:      Pupils: Pupils are equal, round, and reactive to light  Neck:      Musculoskeletal: Normal range of motion  Cardiovascular:      Rate and Rhythm: Regular rhythm  Tachycardia present  Pulses: Normal pulses  Pulmonary:      Effort: Pulmonary effort is normal  No respiratory distress  Abdominal:      General: There is no distension  Tenderness: There is no abdominal tenderness  Comments: Obese with pannus    Genitourinary:     Comments: Right groin with redness/induration, small opening, tender   Musculoskeletal:         General: No swelling  Skin:     General: Skin is warm and dry  Neurological:      General: No focal deficit present  Mental Status: He is alert and oriented to person, place, and time  Psychiatric:         Mood and Affect: Mood normal          Lab Results:   I have personally reviewed pertinent lab results  , CBC:   Lab Results   Component Value Date    WBC 17 70 (H) 08/12/2020    HGB 15 8 08/12/2020    HCT 46 7 08/12/2020    MCV 86 08/12/2020     08/12/2020    MCH 29 0 08/12/2020    MCHC 33 8 08/12/2020    RDW 13 5 08/12/2020    MPV 9 7 08/12/2020   , CMP:   Lab Results   Component Value Date    SODIUM 130 (L) 08/12/2020    K 4 1 08/12/2020    CL 94 (L) 08/12/2020    CO2 18 (L) 08/12/2020    BUN 21 08/12/2020    CREATININE 0 86 08/12/2020    CALCIUM 9 8 08/12/2020    AST 13 08/12/2020    ALT 22 08/12/2020    ALKPHOS 91 08/12/2020    EGFR 95 08/12/2020     Imaging: I have personally reviewed pertinent reports  EKG, Pathology, and Other Studies: I have personally reviewed pertinent reports        Code Status: No Order  Advance Directive and Living Will:      Power of :    POLST:

## 2020-08-12 NOTE — ED PROVIDER NOTES
History  Chief Complaint   Patient presents with    Abscess     HPI     Transfer to B because patient with Nec Fasc and needs urgent surgery  Discussed with Mary, it will be quicker to send to [facility]  Patient is a 41-year-old male that reports to the emergency department with a reported right groin boil that started yesterday  No vomiting or diarrhea  No lightheadedness or dizziness  In the right groin he has a palpable area of edema  Patient is overweight and does have a large pannus  Some overlying cellulitis  Patient initially tachycardic at 124  Of note, patient with cellulitis tachycardia, possible nec fasc on CT - sepsis identified 5:07pm       5:11pm discussed with Dr Lacy Woods who spoke with Dr Dave Tillman  It seems as though an OR will be available quicker if we send the patient to [facility]  Will arrange priority 1 transfer  MDM pleasant well appearing 62 yom, nec fasc on CT will transfer  Prior to Admission Medications   Prescriptions Last Dose Informant Patient Reported?  Taking?   aspirin 81 MG tablet   No No   Sig: Take 1 tablet (81 mg total) by mouth daily   atorvastatin (LIPITOR) 80 mg tablet   Yes No   Sig: Take 80 mg by mouth daily   cyclobenzaprine (FLEXERIL) 10 mg tablet   No No   Sig: Take 1 tablet (10 mg total) by mouth 3 (three) times a day as needed for muscle spasms   ibuprofen (MOTRIN) 800 mg tablet   No No   Sig: Take 1 tablet (800 mg total) by mouth every 8 (eight) hours as needed for mild pain, moderate pain, fever or headaches   lisinopril (ZESTRIL) 5 mg tablet   Yes No   Sig: Take 1 tablet by mouth daily    metFORMIN (GLUCOPHAGE) 500 mg tablet   Yes No   Sig: daily Take 2 tablets AM and 1 tablet PM   metoprolol tartrate (LOPRESSOR) 25 mg tablet   No No   Sig: Take 1 tablet (25 mg total) by mouth 2 (two) times a day   nitroglycerin (NITROSTAT) 0 4 mg SL tablet   Yes No   Sig: Place 1 tablet under the tongue as needed   predniSONE 10 mg tablet   No No Sig: 3 tabs BID x 4 days, 2 tabs BID x 3 days, 1 tab BID x 3 days   traMADol (ULTRAM) 50 mg tablet   Yes No   Si mg as needed      Facility-Administered Medications: None       Past Medical History:   Diagnosis Date    CAD (coronary artery disease)     s/p CABG x 2 LIMA-LAD, VG- OM1 2013    Carotid duplex 2013    20-49% bilateral stenosis    Diabetes ([facility] [address] )     History of echocardiogram 2016    EF 55% Mild LVH  Mild MR    Hyperlipidemia     Hypertension        Past Surgical History:   Procedure Laterality Date    CARDIAC CATHETERIZATION  2013    EF 50% Severe left main disease 90%, OM1 99%, 100% RCA (bypass scheduled)    CORONARY ARTERY BYPASS GRAFT  2013    CABG X2 LIMA-LAD, VG-OM1       History reviewed  No pertinent family history  I have reviewed and agree with the history as documented  E-Cigarette/Vaping    E-Cigarette Use Never User      E-Cigarette/Vaping Substances     Social History     Tobacco Use    Smoking status: Former Smoker    Smokeless tobacco: Former User   Substance Use Topics    Alcohol use: Not Currently    Drug use: Not Currently       Review of Systems   Constitutional: Positive for chills  Skin: Positive for color change and wound  All other systems reviewed and are negative  Physical Exam  Physical Exam  Vitals signs and nursing note reviewed  Constitutional:       Appearance: He is well-developed  He is not diaphoretic  HENT:      Head: Normocephalic and atraumatic  Eyes:      Pupils: Pupils are equal, round, and reactive to light  Neck:      Musculoskeletal: Normal range of motion and neck supple  Cardiovascular:      Rate and Rhythm: Regular rhythm  Tachycardia present  Heart sounds: Normal heart sounds  No murmur  Pulmonary:      Effort: Pulmonary effort is normal  No respiratory distress  Breath sounds: Normal breath sounds  No wheezing     Abdominal:      General: Bowel sounds are normal  There is no distension  Palpations: Abdomen is soft  Tenderness: There is no abdominal tenderness  Musculoskeletal: Normal range of motion  General: No tenderness  Skin:     General: Skin is warm and dry  Findings: Erythema present  Comments: Right groin with no skin breakdown to suggest abscess/tracking air  + swelling and erythema  Neurological:      General: No focal deficit present  Mental Status: He is alert and oriented to person, place, and time  Coordination: Coordination normal    Psychiatric:         Mood and Affect: Mood normal          Behavior: Behavior normal          Vital Signs  ED Triage Vitals [08/12/20 1512]   Temperature Pulse Respirations Blood Pressure SpO2   97 6 °F (36 4 °C) (!) 129 18 (!) 182/114 97 %      Temp Source Heart Rate Source Patient Position - Orthostatic VS BP Location FiO2 (%)   Temporal Monitor Sitting Left arm --      Pain Score       Worst Possible Pain           Vitals:    08/12/20 1550 08/12/20 1715 08/12/20 1729 08/12/20 1746   BP: 170/91 (!) 178/87     Pulse: (!) 124 (!) 117 (!) 123 (!) 116   Patient Position - Orthostatic VS:             Visual Acuity      ED Medications  Medications   vancomycin (VANCOCIN) 1,500 mg in sodium chloride 0 9 % 250 mL IVPB (0 mg Intravenous Hold 8/12/20 1812)   sodium chloride 0 9 % bolus 1,000 mL (1,000 mL Intravenous New Bag 8/12/20 1743)   piperacillin-tazobactam (ZOSYN) 3 375 g in sodium chloride 0 9 % 100 mL IVPB (0 g Intravenous Stopped 8/12/20 1734)   clindamycin (CLEOCIN) IVPB (premix) 600 mg 50 mL (0 mg Intravenous Stopped 8/12/20 1703)   sodium chloride 0 9 % bolus 1,000 mL (0 mL Intravenous Stopped 8/12/20 1742)   iohexol (OMNIPAQUE) 350 MG/ML injection (MULTI-DOSE) 100 mL (100 mL Intravenous Given 8/12/20 1640)       Diagnostic Studies  Results Reviewed     Procedure Component Value Units Date/Time    Lactic acid 2 Hours [[id number]] Collected:  08/12/20 1811    Lab Status:   In process Specimen: Blood from Arm, Left Updated:  08/12/20 1817    Blood culture #1 [[id number]] Collected:  08/12/20 1717    Lab Status: In process Specimen:  Blood from Arm, Right Updated:  08/12/20 1740    Blood culture #2 [[id number]] Collected:  08/12/20 1722    Lab Status: In process Specimen:  Blood from Arm, Left Updated:  08/12/20 1740    Urine Microscopic [[id number]]  (Abnormal) Collected:  08/12/20 1533    Lab Status:  Final result Specimen:  Urine, Clean Catch Updated:  08/12/20 1623     RBC, UA 0-1 /hpf      WBC, UA 20-30 /hpf      Epithelial Cells Occasional /hpf      Bacteria, UA Occasional /hpf      Fine granular casts 1-2 /lpf      OTHER OBSERVATIONS Yeast Cells Present     MUCUS THREADS Occasional    Urine culture [[id number]] Collected:  08/12/20 1533    Lab Status: In process Specimen:  Urine, Clean Catch Updated:  08/12/20 1622    Lactic acid [[id number]]  (Abnormal) Collected:  08/12/20 1528    Lab Status:  Final result Specimen:  Blood from Arm, Left Updated:  08/12/20 1613     LACTIC ACID 2 3 mmol/L     Narrative:       Result may be elevated if tourniquet was used during collection      Comprehensive metabolic panel [[id number]]  (Abnormal) Collected:  08/12/20 1528    Lab Status:  Final result Specimen:  Blood from Arm, Left Updated:  08/12/20 1609     Sodium 130 mmol/L      Potassium 4 1 mmol/L      Chloride 94 mmol/L      CO2 18 mmol/L      ANION GAP 18 mmol/L      BUN 21 mg/dL      Creatinine 0 86 mg/dL      Glucose 378 mg/dL      Calcium 9 8 mg/dL      AST 13 U/L      ALT 22 U/L      Alkaline Phosphatase 91 U/L      Total Protein 7 6 g/dL      Albumin 4 3 g/dL      Total Bilirubin 1 20 mg/dL      eGFR 95 ml/min/1 73sq m     Narrative:       [place] guidelines for Chronic Kidney Disease (CKD):     Stage 1 with normal or high GFR (GFR > 90 mL/min/1 73 square meters)    Stage 2 Mild CKD (GFR = 60-89 mL/min/1 73 square meters)    Stage 3A Moderate CKD (GFR = 45-59 mL/min/1 73 square meters)    Stage 3B Moderate CKD (GFR = 30-44 mL/min/1 73 square meters)    Stage 4 Severe CKD (GFR = 15-29 mL/min/1 73 square meters)    Stage 5 End Stage CKD (GFR <15 mL/min/1 73 square meters)  Note: GFR calculation is accurate only with a steady state creatinine    Troponin I [[id number]]  (Normal) Collected:  08/12/20 1528    Lab Status:  Final result Specimen:  Blood from Arm, Left Updated:  08/12/20 1605     Troponin I <0 03 ng/mL     Protime-INR [[id number]]  (Normal) Collected:  08/12/20 1528    Lab Status:  Final result Specimen:  Blood from Arm, Left Updated:  08/12/20 1559     Protime 13 3 seconds      INR 1 02    APTT [[id number]]  (Normal) Collected:  08/12/20 1528    Lab Status:  Final result Specimen:  Blood from Arm, Left Updated:  08/12/20 1559     PTT 23 seconds     CBC and differential [[id number]]  (Abnormal) Collected:  08/12/20 1530    Lab Status:  Final result Specimen:  Blood from Arm, Left Updated:  08/12/20 1549     WBC 17 70 Thousand/uL      RBC 5 44 Million/uL      Hemoglobin 15 8 g/dL      Hematocrit 46 7 %      MCV 86 fL      MCH 29 0 pg      MCHC 33 8 g/dL      RDW 13 5 %      MPV 9 7 fL      Platelets 611 Thousands/uL      Neutrophils Relative 80 %      Lymphocytes Relative 11 %      Monocytes Relative 9 %      Eosinophils Relative 0 %      Basophils Relative 0 %      Neutrophils Absolute 14 10 Thousands/µL      Lymphocytes Absolute 1 90 Thousands/µL      Monocytes Absolute 1 50 Thousand/µL      Eosinophils Absolute 0 10 Thousand/µL      Basophils Absolute 0 10 Thousands/µL     UA w Reflex to Microscopic w Reflex to Culture [[id number]]  (Abnormal) Collected:  08/12/20 1533    Lab Status:  Final result Specimen:  Urine, Clean Catch Updated:  08/12/20 1549     Color, UA Yellow     Clarity, UA Clear     Specific Gravity, UA 1 025     pH, UA 5 5     Leukocytes, UA Negative     Nitrite, UA Negative     Protein, UA 2+ mg/dl      Glucose, UA 3+ mg/dl      Ketones, UA 80 (3+) mg/dl Urobilinogen, UA 0 2 E U /dl      Bilirubin, UA 1+     Blood, UA Trace-lysed                 CT abdomen pelvis with contrast   Final Result by Simon Flores MD (08/12 1656)      Right groin soft tissue swelling suggesting infection  There is soft tissue air which could relate to a necrotizing fasciitis  Hepatic steatosis and hepatomegaly  Findings were marked as "immediate"in Epic and will now be related to the ordering physician or covering clinical team by the radiology liaison  Workstation performed: [id number]                    Procedures  Procedures         ED Course  ED Course as of Aug 12 1821   Wed Aug 12, 2020   [id number] [phone]  Dr Young Cardenas      [address] report for Bed 4            US AUDIT      Most Recent Value   Initial Alcohol Screen: US AUDIT-C    1  How often do you have a drink containing alcohol?  0 Filed at: 08/12/2020 1513   2  How many drinks containing alcohol do you have on a typical day you are drinking? 0 Filed at: 08/12/2020 1513   3a  Male UNDER 65: How often do you have five or more drinks on one occasion? 0 Filed at: 08/12/2020 1513   3b  FEMALE Any Age, or MALE 65+: How often do you have 4 or more drinks on one occassion? 0 Filed at: 08/12/2020 1513   Audit-C Score  0 Filed at: 08/12/2020 1513                  MALIKA/DAST-10      Most Recent Value   How many times in the past year have you    Used an illegal drug or used a prescription medication for non-medical reasons?   Never Filed at: 08/12/2020 1513                                MDM      Disposition  Final diagnoses:   Necrotizing fasciitis [facility])     Time reflects when diagnosis was documented in both MDM as applicable and the Disposition within this note     Time User Action Codes Description Comment    8/12/2020  5:56 PM Colt Mcnulty, [address] [M72 6] Necrotizing fasciitis [facility])       ED Disposition     ED Disposition Condition Date/Time Comment    Transfer to Another [employer]  Wed Aug 12, 2020  5:56 PM David Woodson should be transferred out to [facility] AND [facility] Dr Jayro Becerril MD Documentation      Most Recent Value   Patient Condition  The patient has been stabilized such that within reasonable medical probability, no material deterioration of the patient condition or the condition of the unborn child(angela) is likely to result from the transfer   Reason for Transfer  Level of Care needed not available at this facility   Benefits of Transfer  Specialized equipment and/or services available at the receiving facility (Include comment)________________________   Risks of Transfer  Potential for delay in receiving treatment, Potential deterioration of medical condition, Loss of IV, Increased discomfort during transfer, Possible worsening of condition or death during transfer   Accepting Physician  [address], [address] [facility]   Sending MD  [facility]   Provider Certification  General risk, such as traffic hazards, adverse weather conditions, rough terrain or turbulence, possible failure of equipment (including vehicle or aircraft), or consequences of actions of persons outside the control of the transport personnel, Unanticipated needs of medical equipment and personnel during transport, Risk of worsening condition, The possibility of a transport vehicle being unavailable      RN Documentation      [facility] [address] [facility] Name, [address]   \[facility] [place] [facility]\""      Follow-up Information    None         Discharge Medication List as of 8/12/2020  6:20 PM      CONTINUE these medications which have NOT CHANGED    Details   aspirin 81 MG tablet Take 1 tablet (81 mg total) by mouth daily, Starting Tue 10/22/2019, No Print      atorvastatin (LIPITOR) 80 mg tablet Take 80 mg by mouth daily, Historical Med      cyclobenzaprine (FLEXERIL) 10 mg tablet Take 1 tablet (10 mg total) by mouth 3 (three) times a day as needed for muscle spasms, Starting Sun 8/2/2020, Normal      ibuprofen (MOTRIN) 800 mg tablet Take 1 tablet (800 mg total) by mouth every 8 (eight) hours as needed for mild pain, moderate pain, fever or headaches, Starting Sun 8/2/2020, Normal      lisinopril (ZESTRIL) 5 mg tablet Take 1 tablet by mouth daily , Starting Tue 11/28/2017, Historical Med      metFORMIN (GLUCOPHAGE) 500 mg tablet daily Take 2 tablets AM and 1 tablet PM, Starting Sun 9/3/2017, Historical Med      metoprolol tartrate (LOPRESSOR) 25 mg tablet Take 1 tablet (25 mg total) by mouth 2 (two) times a day, Starting Tue 10/22/2019, Normal      nitroglycerin (NITROSTAT) 0 4 mg SL tablet Place 1 tablet under the tongue as needed, Historical Med      predniSONE 10 mg tablet 3 tabs BID x 4 days, 2 tabs BID x 3 days, 1 tab BID x 3 days, Normal      traMADol (ULTRAM) 50 mg tablet 50 mg as needed, Historical Med           No discharge procedures on file      PDMP Review     None          ED Provider  Electronically Signed by           Lurdes Collado MD  08/12/20 8841

## 2020-08-12 NOTE — EMTALA/ACUTE CARE TRANSFER
[facility]  [address] [facility] [address]  [phone]  Dept: [phone]      EMTALA TRANSFER CONSENT    NAME Julianna Aparicio                                         1960                              MRN [id number]    I have been informed of my rights regarding examination, treatment, and transfer   by Dr Joss Lynn MD    Benefits: Specialized equipment and/or services available at the receiving facility (Include comment)________________________    Risks: Potential for delay in receiving treatment, Potential deterioration of medical condition, Loss of IV, Increased discomfort during transfer, Possible worsening of condition or death during transfer      Transfer Request   I acknowledge that my medical condition has been evaluated and explained to me by the emergency department physician or other qualified medical person and/or my attending physician who has recommended and offered to me further medical examination and treatment  I understand the Hospital's obligation with respect to the treatment and stabilization of my emergency medical condition  I nevertheless request to be transferred  I release the Hospital, the doctor, and any other persons caring for me from all responsibility or liability for any injury or ill effects that may result from my transfer and agree to accept all responsibility for the consequences of my choice to transfer, rather than receive stabilizing treatment at the Hospital  I understand that because the transfer is my request, my insurance may not provide reimbursement for the services  The Hospital will assist and direct me and my family in how to make arrangements for transfer, but the hospital is not liable for any fees charged by the transport service    In spite of this understanding, I refuse to consent to further medical examination and treatment which has been offered to me, and request transfer to Accepting Facility Name, [address] : [facility] AND [facility]  I authorize the performance of emergency medical procedures and treatments upon me in both transit and upon arrival at the receiving facility  Additionally, I authorize the release of any and all medical records to the receiving facility and request they be transported with me, if possible  I authorize the performance of emergency medical procedures and treatments upon me in both transit and upon arrival at the receiving facility  Additionally, I authorize the release of any and all medical records to the receiving facility and request they be transported with me, if possible  I understand that the safest mode of transportation during a medical emergency is an ambulance and that the Hospital advocates the use of this mode of transport  Risks of traveling to the receiving facility by car, including absence of medical control, life sustaining equipment, such as oxygen, and medical personnel has been explained to me and I fully understand them  (LULU CORRECT BOX BELOW)  [  ]  I consent to the stated transfer and to be transported by ambulance/helicopter  [  ]  I consent to the stated transfer, but refuse transportation by ambulance and accept full responsibility for my transportation by car  I understand the risks of non-ambulance transfers and I exonerate the Hospital and its staff from any deterioration in my condition that results from this refusal     X___________________________________________    DATE  20  TIME________  Signature of patient or legally responsible individual signing on patient behalf           RELATIONSHIP TO PATIENT_________________________          Provider Certification    NAME Penny Fernández                                         1960                              MRN [id number]    A medical screening exam was performed on the above named patient    Based on the examination:    Condition Necessitating Transfer The encounter diagnosis was Necrotizing fasciitis ([facility] [address] )  Patient Condition: The patient has been stabilized such that within reasonable medical probability, no material deterioration of the patient condition or the condition of the unborn child(angela) is likely to result from the transfer    Reason for Transfer: Level of Care needed not available at this facility    Transfer Requirements: Facility [facility]   · Space available and qualified personnel available for treatment as acknowledged by    · Agreed to accept transfer and to provide appropriate medical treatment as acknowledged by       Joe Neff  · Appropriate medical records of the examination and treatment of the patient are provided at the time of transfer   [address] [facility], [address] _______  · Transfer will be performed by qualified personnel from    and appropriate transfer equipment as required, including the use of necessary and appropriate life support measures      Provider Certification: I have examined the patient and explained the following risks and benefits of being transferred/refusing transfer to the patient/family:  General risk, such as traffic hazards, adverse weather conditions, rough terrain or turbulence, possible failure of equipment (including vehicle or aircraft), or consequences of actions of persons outside the control of the transport personnel, Unanticipated needs of medical equipment and personnel during transport, Risk of worsening condition, The possibility of a transport vehicle being unavailable      Based on these reasonable risks and benefits to the patient and/or the unborn child(angela), and based upon the information available at the time of the patients examination, I certify that the medical benefits reasonably to be expected from the provision of appropriate medical treatments at another medical facility outweigh the increasing risks, if any, to the individuals medical condition, and in the case of labor to the unborn child, from effecting the transfer      X____________________________________________ DATE 08/12/20        TIME_______      ORIGINAL - SEND TO MEDICAL RECORDS   COPY - SEND WITH PATIENT DURING TRANSFER

## 2020-08-13 ENCOUNTER — ANESTHESIA (INPATIENT)
Dept: PERIOP | Facility: HOSPITAL | Age: 60
DRG: 501 | End: 2020-08-13
Payer: COMMERCIAL

## 2020-08-13 ENCOUNTER — ANESTHESIA EVENT (INPATIENT)
Dept: PERIOP | Facility: HOSPITAL | Age: 60
DRG: 501 | End: 2020-08-13
Payer: COMMERCIAL

## 2020-08-13 LAB
ANION GAP SERPL CALCULATED.3IONS-SCNC: 12 MMOL/L (ref 4–13)
ATRIAL RATE: 125 BPM
BASE EX.OXY STD BLDV CALC-SCNC: 93.5 % (ref 60–80)
BASE EXCESS BLDV CALC-SCNC: -6.8 MMOL/L
BASOPHILS # BLD AUTO: 0.05 THOUSANDS/ΜL (ref 0–0.1)
BASOPHILS NFR BLD AUTO: 0 % (ref 0–1)
BETA-HYDROXYBUTYRATE: 2.3 MMOL/L
BUN SERPL-MCNC: 12 MG/DL (ref 5–25)
CALCIUM SERPL-MCNC: 7.9 MG/DL (ref 8.3–10.1)
CHLORIDE SERPL-SCNC: 110 MMOL/L (ref 100–108)
CO2 SERPL-SCNC: 16 MMOL/L (ref 21–32)
CREAT SERPL-MCNC: 0.6 MG/DL (ref 0.6–1.3)
EOSINOPHIL # BLD AUTO: 0.02 THOUSAND/ΜL (ref 0–0.61)
EOSINOPHIL NFR BLD AUTO: 0 % (ref 0–6)
ERYTHROCYTE [DISTWIDTH] IN BLOOD BY AUTOMATED COUNT: 12.7 % (ref 11.6–15.1)
EST. AVERAGE GLUCOSE BLD GHB EST-MCNC: 266 MG/DL
EST. AVERAGE GLUCOSE BLD GHB EST-MCNC: 289 MG/DL
GFR SERPL CREATININE-BSD FRML MDRD: 110 ML/MIN/1.73SQ M
GLUCOSE SERPL-MCNC: 172 MG/DL (ref 65–140)
GLUCOSE SERPL-MCNC: 172 MG/DL (ref 65–140)
GLUCOSE SERPL-MCNC: 197 MG/DL (ref 65–140)
GLUCOSE SERPL-MCNC: 210 MG/DL (ref 65–140)
GLUCOSE SERPL-MCNC: 231 MG/DL (ref 65–140)
GLUCOSE SERPL-MCNC: 261 MG/DL (ref 65–140)
GLUCOSE SERPL-MCNC: 262 MG/DL (ref 65–140)
HBA1C MFR BLD: 10.9 %
HBA1C MFR BLD: 11.7 %
HCO3 BLDV-SCNC: 17.8 MMOL/L (ref 24–30)
HCT VFR BLD AUTO: 40.7 % (ref 36.5–49.3)
HGB BLD-MCNC: 12.9 G/DL (ref 12–17)
IMM GRANULOCYTES # BLD AUTO: 0.13 THOUSAND/UL (ref 0–0.2)
IMM GRANULOCYTES NFR BLD AUTO: 1 % (ref 0–2)
LYMPHOCYTES # BLD AUTO: 1.7 THOUSANDS/ΜL (ref 0.6–4.47)
LYMPHOCYTES NFR BLD AUTO: 11 % (ref 14–44)
MCH RBC QN AUTO: 28.5 PG (ref 26.8–34.3)
MCHC RBC AUTO-ENTMCNC: 31.7 G/DL (ref 31.4–37.4)
MCV RBC AUTO: 90 FL (ref 82–98)
MONOCYTES # BLD AUTO: 1.12 THOUSAND/ΜL (ref 0.17–1.22)
MONOCYTES NFR BLD AUTO: 7 % (ref 4–12)
NEUTROPHILS # BLD AUTO: 12.3 THOUSANDS/ΜL (ref 1.85–7.62)
NEUTS SEG NFR BLD AUTO: 81 % (ref 43–75)
NRBC BLD AUTO-RTO: 0 /100 WBCS
O2 CT BLDV-SCNC: 17.9 ML/DL
P AXIS: 3 DEGREES
PCO2 BLDV: 33.3 MM HG (ref 42–50)
PH BLDV: 7.35 [PH] (ref 7.3–7.4)
PLATELET # BLD AUTO: 267 THOUSANDS/UL (ref 149–390)
PLATELET # BLD AUTO: 274 THOUSANDS/UL (ref 149–390)
PMV BLD AUTO: 11.4 FL (ref 8.9–12.7)
PMV BLD AUTO: 11.5 FL (ref 8.9–12.7)
PO2 BLDV: 82.7 MM HG (ref 35–45)
POTASSIUM SERPL-SCNC: 4 MMOL/L (ref 3.5–5.3)
PR INTERVAL: 120 MS
QRS AXIS: 23 DEGREES
QRSD INTERVAL: 78 MS
QT INTERVAL: 288 MS
QTC INTERVAL: 415 MS
RBC # BLD AUTO: 4.52 MILLION/UL (ref 3.88–5.62)
SODIUM SERPL-SCNC: 138 MMOL/L (ref 136–145)
T WAVE AXIS: 27 DEGREES
VENTRICULAR RATE: 125 BPM
WBC # BLD AUTO: 15.32 THOUSAND/UL (ref 4.31–10.16)

## 2020-08-13 PROCEDURE — 0J9C00Z DRAINAGE OF PELVIC REGION SUBCUTANEOUS TISSUE AND FASCIA WITH DRAINAGE DEVICE, OPEN APPROACH: ICD-10-PCS | Performed by: SURGERY

## 2020-08-13 PROCEDURE — 11005 DBRDMT SKIN ABDOMINAL WALL: CPT | Performed by: SURGERY

## 2020-08-13 PROCEDURE — 0JD80ZZ EXTRACTION OF ABDOMEN SUBCUTANEOUS TISSUE AND FASCIA, OPEN APPROACH: ICD-10-PCS | Performed by: SURGERY

## 2020-08-13 PROCEDURE — 99255 IP/OBS CONSLTJ NEW/EST HI 80: CPT | Performed by: INTERNAL MEDICINE

## 2020-08-13 PROCEDURE — 94760 N-INVAS EAR/PLS OXIMETRY 1: CPT

## 2020-08-13 PROCEDURE — 85049 AUTOMATED PLATELET COUNT: CPT | Performed by: SURGERY

## 2020-08-13 PROCEDURE — 83036 HEMOGLOBIN GLYCOSYLATED A1C: CPT | Performed by: INTERNAL MEDICINE

## 2020-08-13 PROCEDURE — NC001 PR NO CHARGE: Performed by: SURGERY

## 2020-08-13 PROCEDURE — 85025 COMPLETE CBC W/AUTO DIFF WBC: CPT | Performed by: SURGERY

## 2020-08-13 PROCEDURE — 93010 ELECTROCARDIOGRAM REPORT: CPT | Performed by: INTERNAL MEDICINE

## 2020-08-13 PROCEDURE — 80048 BASIC METABOLIC PNL TOTAL CA: CPT | Performed by: SURGERY

## 2020-08-13 PROCEDURE — 94660 CPAP INITIATION&MGMT: CPT

## 2020-08-13 PROCEDURE — 83036 HEMOGLOBIN GLYCOSYLATED A1C: CPT | Performed by: SURGERY

## 2020-08-13 PROCEDURE — 82010 KETONE BODYS QUAN: CPT | Performed by: INTERNAL MEDICINE

## 2020-08-13 PROCEDURE — 99232 SBSQ HOSP IP/OBS MODERATE 35: CPT | Performed by: SURGERY

## 2020-08-13 PROCEDURE — 82948 REAGENT STRIP/BLOOD GLUCOSE: CPT

## 2020-08-13 PROCEDURE — 82805 BLOOD GASES W/O2 SATURATION: CPT | Performed by: INTERNAL MEDICINE

## 2020-08-13 RX ORDER — ONDANSETRON 2 MG/ML
INJECTION INTRAMUSCULAR; INTRAVENOUS AS NEEDED
Status: DISCONTINUED | OUTPATIENT
Start: 2020-08-13 | End: 2020-08-13

## 2020-08-13 RX ORDER — MIDAZOLAM HYDROCHLORIDE 2 MG/2ML
INJECTION, SOLUTION INTRAMUSCULAR; INTRAVENOUS AS NEEDED
Status: DISCONTINUED | OUTPATIENT
Start: 2020-08-13 | End: 2020-08-13

## 2020-08-13 RX ORDER — FENTANYL CITRATE/PF 50 MCG/ML
50 SYRINGE (ML) INJECTION
Status: DISCONTINUED | OUTPATIENT
Start: 2020-08-13 | End: 2020-08-13 | Stop reason: HOSPADM

## 2020-08-13 RX ORDER — SUCCINYLCHOLINE/SOD CL,ISO/PF 100 MG/5ML
SYRINGE (ML) INTRAVENOUS AS NEEDED
Status: DISCONTINUED | OUTPATIENT
Start: 2020-08-13 | End: 2020-08-13

## 2020-08-13 RX ORDER — PROPOFOL 10 MG/ML
INJECTION, EMULSION INTRAVENOUS AS NEEDED
Status: DISCONTINUED | OUTPATIENT
Start: 2020-08-13 | End: 2020-08-13

## 2020-08-13 RX ORDER — HYDROMORPHONE HCL/PF 1 MG/ML
0.5 SYRINGE (ML) INJECTION
Status: DISCONTINUED | OUTPATIENT
Start: 2020-08-13 | End: 2020-08-13 | Stop reason: HOSPADM

## 2020-08-13 RX ORDER — LIDOCAINE HYDROCHLORIDE 10 MG/ML
INJECTION, SOLUTION EPIDURAL; INFILTRATION; INTRACAUDAL; PERINEURAL AS NEEDED
Status: DISCONTINUED | OUTPATIENT
Start: 2020-08-13 | End: 2020-08-13

## 2020-08-13 RX ORDER — INSULIN GLARGINE 100 [IU]/ML
30 INJECTION, SOLUTION SUBCUTANEOUS EVERY MORNING
Status: DISCONTINUED | OUTPATIENT
Start: 2020-08-14 | End: 2020-08-15

## 2020-08-13 RX ORDER — FENTANYL CITRATE 50 UG/ML
INJECTION, SOLUTION INTRAMUSCULAR; INTRAVENOUS AS NEEDED
Status: DISCONTINUED | OUTPATIENT
Start: 2020-08-13 | End: 2020-08-13

## 2020-08-13 RX ORDER — KETAMINE HCL IN NACL, ISO-OSM 100MG/10ML
SYRINGE (ML) INJECTION AS NEEDED
Status: DISCONTINUED | OUTPATIENT
Start: 2020-08-13 | End: 2020-08-13

## 2020-08-13 RX ORDER — LABETALOL 20 MG/4 ML (5 MG/ML) INTRAVENOUS SYRINGE
AS NEEDED
Status: DISCONTINUED | OUTPATIENT
Start: 2020-08-13 | End: 2020-08-13

## 2020-08-13 RX ORDER — DEXAMETHASONE SODIUM PHOSPHATE 10 MG/ML
INJECTION, SOLUTION INTRAMUSCULAR; INTRAVENOUS AS NEEDED
Status: DISCONTINUED | OUTPATIENT
Start: 2020-08-13 | End: 2020-08-13

## 2020-08-13 RX ORDER — INSULIN GLARGINE 100 [IU]/ML
10 INJECTION, SOLUTION SUBCUTANEOUS
Status: DISCONTINUED | OUTPATIENT
Start: 2020-08-13 | End: 2020-08-15 | Stop reason: HOSPADM

## 2020-08-13 RX ORDER — GLYCOPYRROLATE 0.2 MG/ML
INJECTION INTRAMUSCULAR; INTRAVENOUS AS NEEDED
Status: DISCONTINUED | OUTPATIENT
Start: 2020-08-13 | End: 2020-08-13

## 2020-08-13 RX ORDER — MAGNESIUM HYDROXIDE 1200 MG/15ML
LIQUID ORAL AS NEEDED
Status: DISCONTINUED | OUTPATIENT
Start: 2020-08-13 | End: 2020-08-13 | Stop reason: HOSPADM

## 2020-08-13 RX ORDER — ROCURONIUM BROMIDE 10 MG/ML
INJECTION, SOLUTION INTRAVENOUS AS NEEDED
Status: DISCONTINUED | OUTPATIENT
Start: 2020-08-13 | End: 2020-08-13

## 2020-08-13 RX ORDER — HYDROMORPHONE HCL 110MG/55ML
PATIENT CONTROLLED ANALGESIA SYRINGE INTRAVENOUS AS NEEDED
Status: DISCONTINUED | OUTPATIENT
Start: 2020-08-13 | End: 2020-08-13

## 2020-08-13 RX ORDER — ONDANSETRON 2 MG/ML
4 INJECTION INTRAMUSCULAR; INTRAVENOUS ONCE AS NEEDED
Status: DISCONTINUED | OUTPATIENT
Start: 2020-08-13 | End: 2020-08-13 | Stop reason: HOSPADM

## 2020-08-13 RX ORDER — NEOSTIGMINE METHYLSULFATE 1 MG/ML
INJECTION INTRAVENOUS AS NEEDED
Status: DISCONTINUED | OUTPATIENT
Start: 2020-08-13 | End: 2020-08-13

## 2020-08-13 RX ADMIN — ONDANSETRON 4 MG: 2 INJECTION INTRAMUSCULAR; INTRAVENOUS at 20:05

## 2020-08-13 RX ADMIN — METRONIDAZOLE 500 MG: 500 INJECTION, SOLUTION INTRAVENOUS at 09:21

## 2020-08-13 RX ADMIN — VANCOMYCIN HYDROCHLORIDE 1750 MG: 1 INJECTION, POWDER, LYOPHILIZED, FOR SOLUTION INTRAVENOUS at 19:41

## 2020-08-13 RX ADMIN — VANCOMYCIN HYDROCHLORIDE 1750 MG: 1 INJECTION, POWDER, LYOPHILIZED, FOR SOLUTION INTRAVENOUS at 04:18

## 2020-08-13 RX ADMIN — METRONIDAZOLE 500 MG: 500 INJECTION, SOLUTION INTRAVENOUS at 17:39

## 2020-08-13 RX ADMIN — FENTANYL CITRATE 100 MCG: 50 INJECTION, SOLUTION INTRAMUSCULAR; INTRAVENOUS at 19:43

## 2020-08-13 RX ADMIN — Medication 25 MG: at 19:43

## 2020-08-13 RX ADMIN — HYDROMORPHONE HYDROCHLORIDE 0.2 MG: 1 INJECTION, SOLUTION INTRAMUSCULAR; INTRAVENOUS; SUBCUTANEOUS at 01:27

## 2020-08-13 RX ADMIN — HYDROMORPHONE HYDROCHLORIDE 0.5 MG: 1 INJECTION, SOLUTION INTRAMUSCULAR; INTRAVENOUS; SUBCUTANEOUS at 04:18

## 2020-08-13 RX ADMIN — HEPARIN SODIUM 7500 UNITS: 5000 INJECTION INTRAVENOUS; SUBCUTANEOUS at 01:28

## 2020-08-13 RX ADMIN — LABETALOL 20 MG/4 ML (5 MG/ML) INTRAVENOUS SYRINGE 10 MG: at 21:00

## 2020-08-13 RX ADMIN — CEFEPIME HYDROCHLORIDE 2000 MG: 2 INJECTION, POWDER, FOR SOLUTION INTRAVENOUS at 01:48

## 2020-08-13 RX ADMIN — ROCURONIUM BROMIDE 10 MG: 10 INJECTION, SOLUTION INTRAVENOUS at 20:05

## 2020-08-13 RX ADMIN — INSULIN LISPRO 2 UNITS: 100 INJECTION, SOLUTION INTRAVENOUS; SUBCUTANEOUS at 17:40

## 2020-08-13 RX ADMIN — CEFEPIME HYDROCHLORIDE 2000 MG: 2 INJECTION, POWDER, FOR SOLUTION INTRAVENOUS at 19:41

## 2020-08-13 RX ADMIN — FENTANYL CITRATE 50 MCG: 50 INJECTION, SOLUTION INTRAMUSCULAR; INTRAVENOUS at 20:38

## 2020-08-13 RX ADMIN — CLINDAMYCIN PHOSPHATE 900 MG: 900 INJECTION, SOLUTION INTRAVENOUS at 18:29

## 2020-08-13 RX ADMIN — HYDROMORPHONE HYDROCHLORIDE 0.5 MG: 1 INJECTION, SOLUTION INTRAMUSCULAR; INTRAVENOUS; SUBCUTANEOUS at 16:41

## 2020-08-13 RX ADMIN — INSULIN HUMAN 5 UNITS: 100 INJECTION, SOLUTION PARENTERAL at 21:33

## 2020-08-13 RX ADMIN — GLYCOPYRROLATE 0.1 MG: 0.2 INJECTION, SOLUTION INTRAMUSCULAR; INTRAVENOUS at 20:05

## 2020-08-13 RX ADMIN — CLINDAMYCIN PHOSPHATE 900 MG: 900 INJECTION, SOLUTION INTRAVENOUS at 10:34

## 2020-08-13 RX ADMIN — PROPOFOL 200 MG: 10 INJECTION, EMULSION INTRAVENOUS at 19:43

## 2020-08-13 RX ADMIN — DEXAMETHASONE SODIUM PHOSPHATE 5 MG: 10 INJECTION, SOLUTION INTRAMUSCULAR; INTRAVENOUS at 20:05

## 2020-08-13 RX ADMIN — FENTANYL CITRATE 50 MCG: 50 INJECTION, SOLUTION INTRAMUSCULAR; INTRAVENOUS at 20:05

## 2020-08-13 RX ADMIN — CEFEPIME HYDROCHLORIDE 2000 MG: 2 INJECTION, POWDER, FOR SOLUTION INTRAVENOUS at 14:31

## 2020-08-13 RX ADMIN — SODIUM CHLORIDE, SODIUM LACTATE, POTASSIUM CHLORIDE, AND CALCIUM CHLORIDE: .6; .31; .03; .02 INJECTION, SOLUTION INTRAVENOUS at 19:41

## 2020-08-13 RX ADMIN — HYDROMORPHONE HYDROCHLORIDE 0.5 MG: 2 INJECTION, SOLUTION INTRAMUSCULAR; INTRAVENOUS; SUBCUTANEOUS at 20:15

## 2020-08-13 RX ADMIN — SODIUM CHLORIDE, SODIUM LACTATE, POTASSIUM CHLORIDE, AND CALCIUM CHLORIDE 125 ML/HR: .6; .31; .03; .02 INJECTION, SOLUTION INTRAVENOUS at 13:51

## 2020-08-13 RX ADMIN — Medication 10 MG: at 20:37

## 2020-08-13 RX ADMIN — GLYCOPYRROLATE 0.4 MG: 0.2 INJECTION, SOLUTION INTRAMUSCULAR; INTRAVENOUS at 20:48

## 2020-08-13 RX ADMIN — Medication 15 MG: at 20:10

## 2020-08-13 RX ADMIN — MIDAZOLAM 2 MG: 1 INJECTION INTRAMUSCULAR; INTRAVENOUS at 19:41

## 2020-08-13 RX ADMIN — INSULIN GLARGINE 10 UNITS: 100 INJECTION, SOLUTION SUBCUTANEOUS at 10:04

## 2020-08-13 RX ADMIN — HYDROMORPHONE HYDROCHLORIDE 0.5 MG: 2 INJECTION, SOLUTION INTRAMUSCULAR; INTRAVENOUS; SUBCUTANEOUS at 20:45

## 2020-08-13 RX ADMIN — INSULIN LISPRO 2 UNITS: 100 INJECTION, SOLUTION INTRAVENOUS; SUBCUTANEOUS at 10:02

## 2020-08-13 RX ADMIN — ROCURONIUM BROMIDE 20 MG: 10 INJECTION, SOLUTION INTRAVENOUS at 19:55

## 2020-08-13 RX ADMIN — LIDOCAINE HYDROCHLORIDE 50 MG: 10 INJECTION, SOLUTION EPIDURAL; INFILTRATION; INTRACAUDAL; PERINEURAL at 19:43

## 2020-08-13 RX ADMIN — NEOSTIGMINE METHYLSULFATE 2 MG: 1 INJECTION, SOLUTION INTRAVENOUS at 20:48

## 2020-08-13 RX ADMIN — HYDROMORPHONE HYDROCHLORIDE 0.5 MG: 1 INJECTION, SOLUTION INTRAMUSCULAR; INTRAVENOUS; SUBCUTANEOUS at 08:28

## 2020-08-13 RX ADMIN — Medication 140 MG: at 19:45

## 2020-08-13 RX ADMIN — HEPARIN SODIUM 7500 UNITS: 5000 INJECTION INTRAVENOUS; SUBCUTANEOUS at 22:45

## 2020-08-13 NOTE — OP NOTE
OPERATIVE REPORT  PATIENT NAME: Penny Fernández    :  1960  MRN: [id number]  Pt Location: BE OR ROOM 08    SURGERY DATE: 2020    Surgeon(s) and Role:     * Earnestine Prasad MD - Primary     * Alvin Stein MD - Assisting    Preop Diagnosis:  Necrotizing fasciitis ([address] ) [M72 6]    Post-Op Diagnosis Codes:     * Necrotizing fasciitis ([address] ) [M72 6]    Procedure(s) (LRB):  EXCISIONAL DEBRIDEMENT Right pannus/groin (Right)    Specimen(s):  ID Type Source Tests Collected by Time Destination   A : right pannus abcess Tissue Soft Tissue, Other ANAEROBIC CULTURE AND GRAM STAIN, CULTURE, TISSUE AND GRAM STAIN Earnestine Prasad MD 2020 10:32 PM        Estimated Blood Loss:   Minimal    Drains:  Urethral Catheter Latex 16 Fr  (Active)   Number of days: 0       Anesthesia Type:   General    Operative Indications:  Necrotizing fasciitis ([address] ) [M72 6]  63 yo male with hx of DM, CAD, CABG who presented with right groin abscess and imaging findings suggestive for NSTI  Operative Findings:  Indurated right groin without involvement of pannus  Wound measurement: 7cm x 2 5 cm x 2cm    Complications:   None    Procedure and Technique:  Patient identified in preop holding area using name, , MRN  Consent verified  Pt brought back to OR and placed in supine position on OR table  General endotracheal anesthesia administered  Antibiotics given  Patient placed in lithotomy position  Area was prepped and draped in usual sterile fashion  Timeout was called and all were in agreement  Using a 15 blade an elliptical incision was made over the area of induration  Electrocautery was then used to dissect down through the subcutaneous tissues  The skin and subcutaneous specimen was passed off table for analysis  All loculations were broken up using blunt dissection and sultana clamp superiorly, inferiorly, medially and laterally  Minimal purulent discharge was noted   The wound was then copiously irrigated and inspected for bleeding which none was found  The wound was then packed with saline soaked kerlex followed by two pieces of gauze and a trauma ABD  Counts were correct  Anesthesia was reversed  Patient tolerated the procedure well  Dr Paul Hooper was present for the case              I was present for the entire procedure    Patient Disposition:  PACU     SIGNATURE: Mj Hernandez MD  DATE: August 12, 2020  TIME: 11:21 PM

## 2020-08-13 NOTE — CONSULTS
Consultation - Irma Burgos 62 y o  male MRN: [id number]    Unit/Bed#: [facility] 624-01 Encounter: [id number]      Assessment/Plan     Assessment: This is a 62y o -year-old male with    1  Type 2 diabetes mellitus with hyperglycemia  2  Morbid obesity   3  Necrotizing fasciitis of the right groin status post excisional department on the 12/20/2020, plan for washout and debridement again on 08/13/2020  4  History of hypertension and hyperlipidemia  5  History of CAD status post CABG in 2013  6  High suspicion for obstructive sleep apnea based on patient's body habitus  Plan:  -  check A1c level, VBG, and betahydroxybutyrate levels  -  RD consult for diabetes diet education  - Currently pt is NPO for surgery, time of surgery still unknown  - Recommend continuing glargine 10 u for today, and algorithm 4 with meals and bedtime    - When pt is restarted on diet, will start glargine 30 u Q daily and lispro 10 u TIDAC  - Recommend checking Vitamin D levels  - Discussed the possible need for insulin on discharge  Based on blood sugars, but 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  He is willing to make lifestyle changes first  I discussed risks of uncontrolled hyperglycemia with him  - He will benefit from outpatient sleep study and CPAP  - Will recommend starting CPAP while in the hospital      CC: Diabetes Consult    History of Present Illness     HPI: Irma Burgos is a 62y o  year old male with type diabetes mellitus for the past 8 years who is currently admitted for groin cellulitis, necrotizing fasciitis currently admitted for antibiotics and surgical debridement  Endocrinology was consulted for management of hyperglycemia due to type 2 diabetes mellitus    Further history of diabetes mellitus  Patient was first diagnosed with type 2 diabetes mellitus approximately 8 years ago when he was admitted for acute MI requiring CABG    Since being diagnosed, patient has been on metformin  Current regimen includes metformin 1000 mg in the morning and 500 mg at nighttime  He does not check his blood sugars at home  Last A1c checked was in July 2018 was 6 8  He denies history of stroke/TIA  He reports seeing an ophthalmologist last year and exam was negative for retinopathy  Never seen a podiatrist   No results of urine microalbumin to creatinine ratio available with  Although patient reports having regular follow-up with his primary care physician every 3-6 months I highly doubt this because as per chart review, he seems them as needed  He denies requiring hospitalization for hypo or hyperglycemia  Patient reports polyuria waking up at least 6-7 times per night over the past couple of weeks and also has sensation of pins and needles in the tips of the fingers which started 2 weeks ago as well  Diet : Patient eats 3 full meals which includes sugary cereal for breakfast, sandwich and chips for lunch and pork/ beef and  mashed potato with gravy for dinner  He drinks a can of diet soda per day  He snacks on tasty cakes and cupcakes in between his meals  Activity : ,  Patient is a sedentary lifestyle  He drives 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 and after going back home he lays in bed  He is a former smoker, 30 pack year history, stopped in 2013  Remote history of occasional alcohol use  Denies use of any other drugs  FAMILY HISTORY:  TYPE 2 DIABETES mellitus in mother and 4 brothers  Inpatient consult to Endocrinology     Performed by  Rosalie Aguilar MD     Authorized by Sj Suarez MD              Review of Systems   Constitutional: Negative for chills, fatigue and unexpected weight change  HENT: Negative for trouble swallowing  Respiratory: Positive for apnea  Negative for shortness of breath and wheezing  Cardiovascular: Negative for chest pain, palpitations and leg swelling     Gastrointestinal: Negative for abdominal pain, constipation, diarrhea, nausea and vomiting  Endocrine: Positive for polydipsia and polyuria  Negative for cold intolerance, heat intolerance and polyphagia  Genitourinary: Positive for scrotal swelling and testicular pain  Skin: Negative for rash  Psychiatric/Behavioral: Positive for sleep disturbance  All other systems reviewed and are negative  Historical Information   Past Medical History:   Diagnosis Date    CAD (coronary artery disease)     s/p CABG x 2 LIMA-LAD, VG- OM1 8/5/2013    Carotid duplex 08/05/2013    20-49% bilateral stenosis    Diabetes ([address] )     History of echocardiogram 12/22/2016    EF 55% Mild LVH  Mild MR    Hyperlipidemia     Hypertension      Past Surgical History:   Procedure Laterality Date    CARDIAC CATHETERIZATION  08/02/2013    EF 50% Severe left main disease 90%, OM1 99%, 100% RCA (bypass scheduled)    CORONARY ARTERY BYPASS GRAFT  08/05/2013    CABG X2 LIMA-LAD, VG-OM1    WOUND DEBRIDEMENT Right 8/12/2020    Procedure: EXCISIONAL DEBRIDEMENT Right pannus/groin;  Surgeon: Vega Ewing MD;  Location: [facility];  Service: Trauma     Social History   Social History     Substance and Sexual Activity   Alcohol Use Not Currently    Frequency: Never    Binge frequency: Never     Social History     Substance and Sexual Activity   Drug Use Not Currently     Social History     Tobacco Use   Smoking Status Former Smoker   Smokeless Tobacco Former User     Family History: History reviewed  No pertinent family history      Meds/Allergies   Current Facility-Administered Medications   Medication Dose Route Frequency Provider Last Rate Last Dose    cefepime (MAXIPIME) 2 g/50 mL dextrose IVPB  2,000 mg Intravenous Q12H Mariam Hein MD   Stopped at 08/13/20 0230    clindamycin (CLEOCIN) IVPB (premix) 900 mg 50 mL  900 mg Intravenous Q8H Mariam Hein  mL/hr at 08/13/20 1034 900 mg at 08/13/20 1034    heparin (porcine) subcutaneous injection 7,500 Units  7,500 Units Subcutaneous [address] Libra Aguillon MD   Stopped at 08/13/20 0600    HYDROmorphone (DILAUDID) injection 0 2 mg  0 2 mg Intravenous Q3H PRN Libra Aguillon MD   0 2 mg at 08/13/20 0127    HYDROmorphone (DILAUDID) injection 0 5 mg  0 5 mg Intravenous Q3H PRN Libra Aguillon MD   0 5 mg at 08/13/20 0828    HYDROmorphone (DILAUDID) injection 1 mg  1 mg Intravenous Q3H PRN Libra Aguillon MD        insulin glargine (LANTUS) subcutaneous injection 10 Units 0 1 mL  10 Units Subcutaneous HS Laura Díaz MD   10 Units at 08/13/20 1004    insulin lispro (HumaLOG) 100 units/mL subcutaneous injection 2-12 Units  2-12 Units Subcutaneous Q6H [facility] Laura Díaz MD   2 Units at 08/13/20 1002    lactated ringers infusion  125 mL/hr Intravenous Continuous Libra Aguillon  mL/hr at 08/13/20 0818 125 mL/hr at 08/13/20 0818    metroNIDAZOLE (FLAGYL) IVPB (premix) 500 mg 100 mL  500 mg Intravenous Q8H Libra Aguillon  mL/hr at 08/13/20 0921 500 mg at 08/13/20 0921    ondansetron (ZOFRAN) injection 4 mg  4 mg Intravenous Q6H PRN Libra Aguillon MD   4 mg at 08/12/20 2303    vancomycin (VANCOCIN) 1,750 mg in sodium chloride 0 9 % 500 mL IVPB  20 mg/kg (Adjusted) Intravenous Q12H Libra Aguillon  mL/hr at 08/13/20 9057       No Known Allergies    Objective   Vitals: Blood pressure 153/85, pulse 92, temperature 97 5 °F (36 4 °C), temperature source Oral, resp  rate 18, height 5' 4" (1 626 m), weight (!) 145 kg (320 lb), SpO2 96 %  Intake/Output Summary (Last 24 hours) at 8/13/2020 1035  Last data filed at 8/13/2020 0800  Gross per 24 hour   Intake 1585 42 ml   Output 1400 ml   Net 185 42 ml     Invasive Devices     Peripheral Intravenous Line            Peripheral IV 08/13/20 Right Hand less than 1 day          Drain            Urethral Catheter Latex 16 Fr  less than 1 day                Physical Exam    General: morbidly obese  Male, resting in bed     HEENT: Poor dentition, dry mucosa, Malampati score 4   Respiratory: non laboured, no accessory muscle usage  Prior sternotomy scar present  Abdomen: Soft, obese, non tenderGenitourinary: Dressing present around groin  Loza catheter present  Musculoskeletal: normal ROM in upper and lower extremities  Integumentary: warm, dry  Neurological: a/o x3  Psychiatric: cooperative     Lab Results:       Lab Results   Component Value Date    WBC 15 32 (H) 08/13/2020    HGB 12 9 08/13/2020    HCT 40 7 08/13/2020    MCV 90 08/13/2020     08/13/2020     08/13/2020     Lab Results   Component Value Date/Time    BUN 12 08/13/2020 05:12 AM    BUN 11 01/08/2014 05:41 PM     01/08/2014 05:41 PM    K 4 0 08/13/2020 05:12 AM    K 4 5 01/08/2014 05:41 PM     (H) 08/13/2020 05:12 AM    CL 97 (L) 01/08/2014 05:41 PM    CO2 16 (L) 08/13/2020 05:12 AM    CO2 26 0 01/08/2014 05:41 PM    CREATININE 0 60 08/13/2020 05:12 AM    CREATININE 0 72 01/08/2014 05:41 PM    AST 13 08/12/2020 03:28 PM    AST 21 01/08/2014 05:41 PM    ALT 22 08/12/2020 03:28 PM    ALT 42 01/08/2014 05:41 PM    ALB 4 3 08/12/2020 03:28 PM    ALB 3 7 01/08/2014 05:41 PM     No results for input(s): CHOL, HDL, LDL, TRIG, VLDL in the last 72 hours  No results found for: Lisette Perez  POC Glucose (mg/dl)   Date Value   08/13/2020 197 (H)   08/12/2020 235 (H)   08/12/2020 262 (H)       Imaging Studies: I have personally reviewed pertinent reports  Portions of the record may have been created with voice recognition software

## 2020-08-13 NOTE — ANESTHESIA PREPROCEDURE EVALUATION
Procedure:  EXCISIONAL DEBRIDEMENT Right pannus/groin (Right Groin)    Relevant Problems   CARDIO   (+) Benign essential HTN   (+) Coronary artery disease involving coronary bypass graft x2 (s/p CABG x 2 LIMA-LAD, VG- OM1 8/5/2013   )   (+) Mixed hyperlipidemia      ENDO   (+) Type 2 diabetes mellitus without complication, without long-term current use of insulin (HCC)      Other   (+) Morbid obesity ([address] )     Stress test ordered but not completed    No recent ECHOs    Last ECHO 12/22/2016   EF 55% Mild LVH  Mild MR      Physical Exam    Airway    Mallampati score: III  TM Distance: >3 FB  Neck ROM: full     Dental   No notable dental hx     Cardiovascular  Rhythm: regular, Rate: normal, Cardiovascular exam normal    Pulmonary  Pulmonary exam normal Breath sounds clear to auscultation,     Other Findings  Very poor dentition, with multiple missing teeth  Teeth present are loose      Anesthesia Plan  ASA Score- 3 Emergent    Anesthesia Type- general with ASA Monitors  Additional Monitors:   Airway Plan: ETT  Comment: Risks and benefits discussed with patient including possible PONV, sore throat, and possibility of rare anesthetic and surgical emergencies          Plan Factors-Exercise tolerance (METS): >4 METS  Patient summary reviewed  Patient instructed to abstain from smoking on day of procedure  Patient did not smoke on day of surgery  Induction- intravenous  Postoperative Plan- Plan for postoperative opioid use  Planned trial extubation    Informed Consent- Anesthetic plan and risks discussed with patient  I personally reviewed this patient with the CRNA  Discussed and agreed on the Anesthesia Plan with the CRNA  Marco Villar

## 2020-08-13 NOTE — PROGRESS NOTES
Post- OP Note - General Surgery   Mike Kaye 62 y o  male MRN: [id number]  Unit/Bed#: [facility] 624-01 Encounter: [id number]    Assessment:  62 M with concerns for necrotizing soft tissue infection of his right groin/pannus  S/p washout and debridement 8/13    VSS, Afebrile    Plan:  Continue ABX  NPO  Washout 8/14  Follow up Cultures      Subjective/Objective     Subjective:   Patient states pain is well controlled  Denies nausea or vomiting  No concerns at this time  Having urine output  Objective:    Blood pressure 168/94, pulse 101, temperature (!) 97 2 °F (36 2 °C), resp  rate 17, height 5' 4" (1 626 m), weight (!) 145 kg (320 lb), SpO2 97 %  ,Body mass index is 54 93 kg/m²  Intake/Output Summary (Last 24 hours) at 8/13/2020 0059  Last data filed at 8/13/2020 0000  Gross per 24 hour   Intake 750 ml   Output 400 ml   Net 350 ml       Invasive Devices     Peripheral Intravenous Line            Peripheral IV 08/12/20 Right Hand less than 1 day          Drain            Urethral Catheter Latex 16 Fr  less than 1 day                Physical Exam:   Gen:  NAD  HEENT: normocephalic, atraumatic  neck supple, trachea midline  CV: RRR  Lungs: Normal respiratory effort on 2 L nasal cannula  Abd: soft, nontender nondistended  Right groin dressing clean dry and intact  Skin: warm/ dry  Neuro:  AxO x3      Results from last 7 days   Lab Units 08/12/20  1530   WBC Thousand/uL 17 70*   HEMOGLOBIN g/dL 15 8   HEMATOCRIT % 46 7   PLATELETS Thousands/uL 318     Results from last 7 days   Lab Units 08/12/20  1528   POTASSIUM mmol/L 4 1   CHLORIDE mmol/L 94*   CO2 mmol/L 18*   BUN mg/dL 21   CREATININE mg/dL 0 86   CALCIUM mg/dL 9 8     Results from last 7 days   Lab Units 08/12/20  1528   INR  1 02   PTT seconds 23

## 2020-08-13 NOTE — PROGRESS NOTES
Progress Note - General Surgery   Daja Flynn 62 y o  male MRN: [id number]  Unit/Bed#: [facility] 624-01 Encounter: [id number]    Assessment:  62 M with concerns for necrotizing soft tissue infection of his right groin/pannus  S/p washout and debridement 8/13    VSS, Afebrile    Plan:  OR Today  NPO  ABX  IVF  Pain control  DVT ppx    Subjective/Objective     Subjective:   No acute events overnight  Pain well controlled  Wants to drink something  Objective:    Blood pressure 153/85, pulse 92, temperature 97 5 °F (36 4 °C), temperature source Oral, resp  rate 18, height 5' 4" (1 626 m), weight (!) 145 kg (320 lb), SpO2 96 %  ,Body mass index is 54 93 kg/m²  Intake/Output Summary (Last 24 hours) at 8/13/2020 0841  Last data filed at 8/13/2020 0601  Gross per 24 hour   Intake 1585 42 ml   Output 1400 ml   Net 185 42 ml       Invasive Devices     Peripheral Intravenous Line            Peripheral IV 08/13/20 Right Hand less than 1 day          Drain            Urethral Catheter Latex 16 Fr  less than 1 day                Physical Exam:   Gen:  NAD  HEENT: normocephalic, atraumatic  Neck supple  Trachea midline  Lungs: Normal respiratory effort  Abd: soft, nontender, dressings clean dry and intact  Skin: warm/ dry  Extremities: pulses 2+  Neuro:  AxO x3      Results from last 7 days   Lab Units 08/13/20  0512 08/12/20  1530   WBC Thousand/uL 15 32* 17 70*   HEMOGLOBIN g/dL 12 9 15 8   HEMATOCRIT % 40 7 46 7   PLATELETS Thousands/uL 267  274 318     Results from last 7 days   Lab Units 08/13/20  0512 08/12/20  1528   POTASSIUM mmol/L 4 0 4 1   CHLORIDE mmol/L 110* 94*   CO2 mmol/L 16* 18*   BUN mg/dL 12 21   CREATININE mg/dL 0 60 0 86   CALCIUM mg/dL 7 9* 9 8     Results from last 7 days   Lab Units 08/12/20  1528   INR  1 02   PTT seconds 23

## 2020-08-13 NOTE — PLAN OF CARE
Problem: NEUROSENSORY - ADULT  Goal: Achieves stable or improved neurological status  Description: INTERVENTIONS  - Monitor and report changes in neurological status  - Monitor vital signs such as temperature, blood pressure, glucose, and any other labs ordered   - Initiate measures to prevent increased intracranial pressure  - Monitor for seizure activity and implement precautions if appropriate      Outcome: Progressing  Goal: Remains free of injury related to seizures activity  Description: INTERVENTIONS  - Maintain airway, patient safety  and administer oxygen as ordered  - Monitor patient for seizure activity, document and report duration and description of seizure to physician/advanced practitioner  - If seizure occurs,  ensure patient safety during seizure  - Reorient patient post seizure  - Seizure pads on all 4 side rails  - Instruct patient/family to notify RN of any seizure activity including if an aura is experienced  - Instruct patient/family to call for assistance with activity based on nursing assessment  - Administer anti-seizure medications if ordered    Outcome: Progressing  Goal: Achieves maximal functionality and self care  Description: INTERVENTIONS  - Monitor swallowing and airway patency with patient fatigue and changes in neurological status  - Encourage and assist patient to increase activity and self care     - Encourage visually impaired, hearing impaired and aphasic patients to use assistive/communication devices  Outcome: Progressing     Problem: CARDIOVASCULAR - ADULT  Goal: Maintains optimal cardiac output and hemodynamic stability  Description: INTERVENTIONS:  - Monitor I/O, vital signs and rhythm  - Monitor for S/S and trends of decreased cardiac output  - Administer and titrate ordered vasoactive medications to optimize hemodynamic stability  - Assess quality of pulses, skin color and temperature  - Assess for signs of decreased coronary artery perfusion  - Instruct patient to report change in severity of symptoms  Outcome: Progressing  Goal: Absence of cardiac dysrhythmias or at baseline rhythm  Description: INTERVENTIONS:  - Continuous cardiac monitoring, vital signs, obtain 12 lead EKG if ordered  - Administer antiarrhythmic and heart rate control medications as ordered  - Monitor electrolytes and administer replacement therapy as ordered  Outcome: Progressing     Problem: GASTROINTESTINAL - ADULT  Goal: Minimal or absence of nausea and/or vomiting  Description: INTERVENTIONS:  - Administer IV fluids if ordered to ensure adequate hydration  - Maintain NPO status until nausea and vomiting are resolved  - Nasogastric tube if ordered  - Administer ordered antiemetic medications as needed  - Provide nonpharmacologic comfort measures as appropriate  - Advance diet as tolerated, if ordered  - Consider nutrition services referral to assist patient with adequate nutrition and appropriate food choices  Outcome: Progressing  Goal: Maintains or returns to baseline bowel function  Description: INTERVENTIONS:  - Assess bowel function  - Encourage oral fluids to ensure adequate hydration  - Administer IV fluids if ordered to ensure adequate hydration  - Administer ordered medications as needed  - Encourage mobilization and activity  - Consider nutritional services referral to assist patient with adequate nutrition and appropriate food choices  Outcome: Progressing  Goal: Maintains adequate nutritional intake  Description: INTERVENTIONS:  - Monitor percentage of each meal consumed  - Identify factors contributing to decreased intake, treat as appropriate  - Assist with meals as needed  - Monitor I&O, weight, and lab values if indicated  - Obtain nutrition services referral as needed  Outcome: Progressing  Goal: Establish and maintain optimal ostomy function  Description: INTERVENTIONS:  - Assess bowel function  - Encourage oral fluids to ensure adequate hydration  - Administer IV fluids if ordered to ensure adequate hydration   - Administer ordered medications as needed  - Encourage mobilization and activity  - Nutrition services referral to assist patient with appropriate food choices  - Assess stoma site  - Consider wound care consult   Outcome: Progressing     Problem: GENITOURINARY - ADULT  Goal: Maintains or returns to baseline urinary function  Description: INTERVENTIONS:  - Assess urinary function  - Encourage oral fluids to ensure adequate hydration if ordered  - Administer IV fluids as ordered to ensure adequate hydration  - Administer ordered medications as needed  - Offer frequent toileting  - Follow urinary retention protocol if ordered  Outcome: Progressing  Goal: Absence of urinary retention  Description: INTERVENTIONS:  - Assess patients ability to void and empty bladder  - Monitor I/O  - Bladder scan as needed  - Discuss with physician/AP medications to alleviate retention as needed  - Discuss catheterization for long term situations as appropriate  Outcome: Progressing  Goal: Urinary catheter remains patent  Description: INTERVENTIONS:  - Assess patency of urinary catheter  - If patient has a chronic urena, consider changing catheter if non-functioning  - Follow guidelines for intermittent irrigation of non-functioning urinary catheter  Outcome: Progressing     Problem: METABOLIC, FLUID AND ELECTROLYTES - ADULT  Goal: Electrolytes maintained within normal limits  Description: INTERVENTIONS:  - Monitor labs and assess patient for signs and symptoms of electrolyte imbalances  - Administer electrolyte replacement as ordered  - Monitor response to electrolyte replacements, including repeat lab results as appropriate  - Instruct patient on fluid and nutrition as appropriate  Outcome: Progressing  Goal: Fluid balance maintained  Description: INTERVENTIONS:  - Monitor labs   - Monitor I/O and WT  - Instruct patient on fluid and nutrition as appropriate  - Assess for signs & symptoms of volume excess or deficit  Outcome: Progressing  Goal: Glucose maintained within target range  Description: INTERVENTIONS:  - Monitor Blood Glucose as ordered  - Assess for signs and symptoms of hyperglycemia and hypoglycemia  - Administer ordered medications to maintain glucose within target range  - Assess nutritional intake and initiate nutrition service referral as needed  Outcome: Progressing     Problem: SKIN/TISSUE INTEGRITY - ADULT  Goal: Skin integrity remains intact  Description: INTERVENTIONS  - Identify patients at risk for skin breakdown  - Assess and monitor skin integrity  - Assess and monitor nutrition and hydration status  - Monitor labs (i e  albumin)  - Assess for incontinence   - Turn and reposition patient  - Assist with mobility/ambulation  - Relieve pressure over bony prominences  - Avoid friction and shearing  - Provide appropriate hygiene as needed including keeping skin clean and dry  - Evaluate need for skin moisturizer/barrier cream  - Collaborate with interdisciplinary team (i e  Nutrition, Rehabilitation, etc )   - Patient/family teaching  Outcome: Progressing  Goal: Incision(s), wounds(s) or drain site(s) healing without S/S of infection  Description: INTERVENTIONS  - Assess and document risk factors for skin impairment   - Assess and document dressing, incision, wound bed, drain sites and surrounding tissue  - Consider nutrition services referral as needed  - Oral mucous membranes remain intact  - Provide patient/ family education  Outcome: Progressing  Goal: Oral mucous membranes remain intact  Description: INTERVENTIONS  - Assess oral mucosa and hygiene practices  - Implement preventative oral hygiene regimen  - Implement oral medicated treatments as ordered  - Initiate Nutrition services referral as needed  Outcome: Progressing     Problem: HEMATOLOGIC - ADULT  Goal: Maintains hematologic stability  Description: INTERVENTIONS  - Assess for signs and symptoms of bleeding or hemorrhage  - Monitor labs  - Administer supportive blood products/factors as ordered and appropriate  Outcome: Progressing     Problem: MUSCULOSKELETAL - ADULT  Goal: Maintain or return mobility to safest level of function  Description: INTERVENTIONS:  - Assess patient's ability to carry out ADLs; assess patient's baseline for ADL function and identify physical deficits which impact ability to perform ADLs (bathing, care of mouth/teeth, toileting, grooming, dressing, etc )  - Assess/evaluate cause of self-care deficits   - Assess range of motion  - Assess patient's mobility  - Assess patient's need for assistive devices and provide as appropriate  - Encourage maximum independence but intervene and supervise when necessary  - Involve family in performance of ADLs  - Assess for home care needs following discharge   - Consider OT consult to assist with ADL evaluation and planning for discharge  - Provide patient education as appropriate  Outcome: Progressing  Goal: Maintain proper alignment of affected body part  Description: INTERVENTIONS:  - Support, maintain and protect limb and body alignment  - Provide patient/ family with appropriate education  Outcome: Progressing     Problem: COPING  Goal: Pt/Family able to verbalize concerns and demonstrate effective coping strategies  Description: INTERVENTIONS:  - Assist patient/family to identify coping skills, available support systems and cultural and spiritual values  - Provide emotional support, including active listening and acknowledgement of concerns of patient and caregivers  - Reduce environmental stimuli, as able  - Provide patient education  - Assess for spiritual pain/suffering and initiate spiritual care, including notification of Pastoral Care or [place] based community as needed  - Assess effectiveness of coping strategies  Outcome: Progressing  Goal: Will report anxiety at manageable levels  Description: INTERVENTIONS:  - Administer medication as ordered  - Teach and encourage coping skills  - Provide emotional support  - Assess patient/family for anxiety and ability to cope  Outcome: Progressing     Problem: Nutrition/Hydration-ADULT  Goal: Nutrient/Hydration intake appropriate for improving, restoring or maintaining nutritional needs  Description: Monitor and assess patient's nutrition/hydration status for malnutrition  Collaborate with interdisciplinary team and initiate plan and interventions as ordered  Monitor patient's weight and dietary intake as ordered or per policy  Utilize nutrition screening tool and intervene as necessary  Determine patient's food preferences and provide high-protein, high-caloric foods as appropriate       INTERVENTIONS:  - Monitor oral intake, urinary output, labs, and treatment plans  - Assess nutrition and hydration status and recommend course of action  - Evaluate amount of meals eaten  - Assist patient with eating if necessary   - Allow adequate time for meals  - Recommend/ encourage appropriate diets, oral nutritional supplements, and vitamin/mineral supplements  - Order, calculate, and assess calorie counts as needed  - Recommend, monitor, and adjust tube feedings and TPN/PPN based on assessed needs  - Assess need for intravenous fluids  - Provide specific nutrition/hydration education as appropriate  - Include patient/family/caregiver in decisions related to nutrition  Outcome: Progressing     Problem: PAIN - ADULT  Goal: Verbalizes/displays adequate comfort level or baseline comfort level  Description: Interventions:  - Encourage patient to monitor pain and request assistance  - Assess pain using appropriate pain scale  - Administer analgesics based on type and severity of pain and evaluate response  - Implement non-pharmacological measures as appropriate and evaluate response  - Consider cultural and social influences on pain and pain management  - Notify physician/advanced practitioner if interventions unsuccessful or patient reports new pain  Outcome: Progressing     Problem: INFECTION - ADULT  Goal: Absence or prevention of progression during hospitalization  Description: INTERVENTIONS:  - Assess and monitor for signs and symptoms of infection  - Monitor lab/diagnostic results  - Monitor all insertion sites, i e  indwelling lines, tubes, and drains  - Monitor endotracheal if appropriate and nasal secretions for changes in amount and color  - [place] appropriate cooling/warming therapies per order  - Administer medications as ordered  - Instruct and encourage patient and family to use good hand hygiene technique  - Identify and instruct in appropriate isolation precautions for identified infection/condition  Outcome: Progressing     Problem: SAFETY ADULT  Goal: Patient will remain free of falls  Description: INTERVENTIONS:  - Assess patient frequently for physical needs  -  Identify cognitive and physical deficits and behaviors that affect risk of falls    -  [place] fall precautions as indicated by assessment   - Educate patient/family on patient safety including physical limitations  - Instruct patient to call for assistance with activity based on assessment  - Modify environment to reduce risk of injury  - Consider OT/PT consult to assist with strengthening/mobility  Outcome: Progressing  Goal: Maintain or return to baseline ADL function  Description: INTERVENTIONS:  -  Assess patient's ability to carry out ADLs; assess patient's baseline for ADL function and identify physical deficits which impact ability to perform ADLs (bathing, care of mouth/teeth, toileting, grooming, dressing, etc )  - Assess/evaluate cause of self-care deficits   - Assess range of motion  - Assess patient's mobility; develop plan if impaired  - Assess patient's need for assistive devices and provide as appropriate  - Encourage maximum independence but intervene and supervise when necessary  - Involve family in performance of ADLs  - Assess for home care needs following discharge   - Consider OT consult to assist with ADL evaluation and planning for discharge  - Provide patient education as appropriate  Outcome: Progressing  Goal: Maintain or return mobility status to optimal level  Description: INTERVENTIONS:  - Assess patient's baseline mobility status (ambulation, transfers, stairs, etc )    - Identify cognitive and physical deficits and behaviors that affect mobility  - Identify mobility aids required to assist with transfers and/or ambulation (gait belt, sit-to-stand, lift, walker, cane, etc )  - [place] fall precautions as indicated by assessment  - Record patient progress and toleration of activity level on Mobility SBAR; progress patient to next Phase/Stage  - Instruct patient to call for assistance with activity based on assessment  - Consider rehabilitation consult to assist with strengthening/weightbearing, etc   Outcome: Progressing     Problem: DISCHARGE PLANNING  Goal: Discharge to home or other facility with appropriate resources  Description: INTERVENTIONS:  - Identify barriers to discharge w/patient and caregiver  - Arrange for needed discharge resources and transportation as appropriate  - Identify discharge learning needs (meds, wound care, etc )  - Arrange for interpretive services to assist at discharge as needed  - Refer to Case Management Department for coordinating discharge planning if the patient needs post-hospital services based on physician/advanced practitioner order or complex needs related to functional status, cognitive ability, or social support system  Outcome: Progressing     Problem: Knowledge Deficit  Goal: Patient/family/caregiver demonstrates understanding of disease process, treatment plan, medications, and discharge instructions  Description: Complete learning assessment and assess knowledge base    Interventions:  - Provide teaching at level of understanding  - Provide teaching via preferred learning methods  Outcome: Progressing Problem: Prexisting or High Potential for Compromised Skin Integrity  Goal: Skin integrity is maintained or improved  Description: INTERVENTIONS:  - Identify patients at risk for skin breakdown  - Assess and monitor skin integrity  - Assess and monitor nutrition and hydration status  - Monitor labs   - Assess for incontinence   - Turn and reposition patient  - Assist with mobility/ambulation  - Relieve pressure over bony prominences  - Avoid friction and shearing  - Provide appropriate hygiene as needed including keeping skin clean and dry  - Evaluate need for skin moisturizer/barrier cream  - Collaborate with interdisciplinary team   - Patient/family teaching  - Consider wound care consult   Outcome: Progressing     Problem: Potential for Falls  Goal: Patient will remain free of falls  Description: INTERVENTIONS:  - Assess patient frequently for physical needs  -  Identify cognitive and physical deficits and behaviors that affect risk of falls    -  [place] fall precautions as indicated by assessment   - Educate patient/family on patient safety including physical limitations  - Instruct patient to call for assistance with activity based on assessment  - Modify environment to reduce risk of injury  - Consider OT/PT consult to assist with strengthening/mobility  Outcome: Progressing

## 2020-08-13 NOTE — PLAN OF CARE
Problem: Nutrition/Hydration-ADULT  Goal: Nutrient/Hydration intake appropriate for improving, restoring or maintaining nutritional needs  Description: Monitor and assess patient's nutrition/hydration status for malnutrition  Collaborate with interdisciplinary team and initiate plan and interventions as ordered  Monitor patient's weight and dietary intake as ordered or per policy  Utilize nutrition screening tool and intervene as necessary  Determine patient's food preferences and provide high-protein, high-caloric foods as appropriate       INTERVENTIONS:  - Monitor oral intake, urinary output, labs, and treatment plans  - Assess nutrition and hydration status and recommend course of action  - Evaluate amount of meals eaten  - Assist patient with eating if necessary   - Allow adequate time for meals  - Recommend/ encourage appropriate diets, oral nutritional supplements, and vitamin/mineral supplements  - Order, calculate, and assess calorie counts as needed  - Recommend, monitor, and adjust tube feedings and TPN/PPN based on assessed needs  - Assess need for intravenous fluids  - Provide specific nutrition/hydration education as appropriate  - Include patient/family/caregiver in decisions related to nutrition  8/13/2020 1609 by [place] [employer] [place]  Outcome: Progressing

## 2020-08-13 NOTE — PLAN OF CARE
Problem: NEUROSENSORY - ADULT  Goal: Achieves stable or improved neurological status  Description: INTERVENTIONS  - Monitor and report changes in neurological status  - Monitor vital signs such as temperature, blood pressure, glucose, and any other labs ordered   - Initiate measures to prevent increased intracranial pressure  - Monitor for seizure activity and implement precautions if appropriate      Outcome: Progressing  Goal: Remains free of injury related to seizures activity  Description: INTERVENTIONS  - Maintain airway, patient safety  and administer oxygen as ordered  - Monitor patient for seizure activity, document and report duration and description of seizure to physician/advanced practitioner  - If seizure occurs,  ensure patient safety during seizure  - Reorient patient post seizure  - Seizure pads on all 4 side rails  - Instruct patient/family to notify RN of any seizure activity including if an aura is experienced  - Instruct patient/family to call for assistance with activity based on nursing assessment  - Administer anti-seizure medications if ordered    Outcome: Progressing  Goal: Achieves maximal functionality and self care  Description: INTERVENTIONS  - Monitor swallowing and airway patency with patient fatigue and changes in neurological status  - Encourage and assist patient to increase activity and self care     - Encourage visually impaired, hearing impaired and aphasic patients to use assistive/communication devices  Outcome: Progressing     Problem: CARDIOVASCULAR - ADULT  Goal: Maintains optimal cardiac output and hemodynamic stability  Description: INTERVENTIONS:  - Monitor I/O, vital signs and rhythm  - Monitor for S/S and trends of decreased cardiac output  - Administer and titrate ordered vasoactive medications to optimize hemodynamic stability  - Assess quality of pulses, skin color and temperature  - Assess for signs of decreased coronary artery perfusion  - Instruct patient to report change in severity of symptoms  Outcome: Progressing  Goal: Absence of cardiac dysrhythmias or at baseline rhythm  Description: INTERVENTIONS:  - Continuous cardiac monitoring, vital signs, obtain 12 lead EKG if ordered  - Administer antiarrhythmic and heart rate control medications as ordered  - Monitor electrolytes and administer replacement therapy as ordered  Outcome: Progressing     Problem: GASTROINTESTINAL - ADULT  Goal: Minimal or absence of nausea and/or vomiting  Description: INTERVENTIONS:  - Administer IV fluids if ordered to ensure adequate hydration  - Maintain NPO status until nausea and vomiting are resolved  - Nasogastric tube if ordered  - Administer ordered antiemetic medications as needed  - Provide nonpharmacologic comfort measures as appropriate  - Advance diet as tolerated, if ordered  - Consider nutrition services referral to assist patient with adequate nutrition and appropriate food choices  Outcome: Progressing  Goal: Maintains or returns to baseline bowel function  Description: INTERVENTIONS:  - Assess bowel function  - Encourage oral fluids to ensure adequate hydration  - Administer IV fluids if ordered to ensure adequate hydration  - Administer ordered medications as needed  - Encourage mobilization and activity  - Consider nutritional services referral to assist patient with adequate nutrition and appropriate food choices  Outcome: Progressing  Goal: Maintains adequate nutritional intake  Description: INTERVENTIONS:  - Monitor percentage of each meal consumed  - Identify factors contributing to decreased intake, treat as appropriate  - Assist with meals as needed  - Monitor I&O, weight, and lab values if indicated  - Obtain nutrition services referral as needed  Outcome: Progressing  Goal: Establish and maintain optimal ostomy function  Description: INTERVENTIONS:  - Assess bowel function  - Encourage oral fluids to ensure adequate hydration  - Administer IV fluids if ordered to ensure adequate hydration   - Administer ordered medications as needed  - Encourage mobilization and activity  - Nutrition services referral to assist patient with appropriate food choices  - Assess stoma site  - Consider wound care consult   Outcome: Progressing     Problem: GENITOURINARY - ADULT  Goal: Maintains or returns to baseline urinary function  Description: INTERVENTIONS:  - Assess urinary function  - Encourage oral fluids to ensure adequate hydration if ordered  - Administer IV fluids as ordered to ensure adequate hydration  - Administer ordered medications as needed  - Offer frequent toileting  - Follow urinary retention protocol if ordered  Outcome: Progressing  Goal: Absence of urinary retention  Description: INTERVENTIONS:  - Assess patients ability to void and empty bladder  - Monitor I/O  - Bladder scan as needed  - Discuss with physician/AP medications to alleviate retention as needed  - Discuss catheterization for long term situations as appropriate  Outcome: Progressing  Goal: Urinary catheter remains patent  Description: INTERVENTIONS:  - Assess patency of urinary catheter  - If patient has a chronic urena, consider changing catheter if non-functioning  - Follow guidelines for intermittent irrigation of non-functioning urinary catheter  Outcome: Progressing     Problem: METABOLIC, FLUID AND ELECTROLYTES - ADULT  Goal: Electrolytes maintained within normal limits  Description: INTERVENTIONS:  - Monitor labs and assess patient for signs and symptoms of electrolyte imbalances  - Administer electrolyte replacement as ordered  - Monitor response to electrolyte replacements, including repeat lab results as appropriate  - Instruct patient on fluid and nutrition as appropriate  Outcome: Progressing  Goal: Fluid balance maintained  Description: INTERVENTIONS:  - Monitor labs   - Monitor I/O and WT  - Instruct patient on fluid and nutrition as appropriate  - Assess for signs & symptoms of volume excess or deficit  Outcome: Progressing  Goal: Glucose maintained within target range  Description: INTERVENTIONS:  - Monitor Blood Glucose as ordered  - Assess for signs and symptoms of hyperglycemia and hypoglycemia  - Administer ordered medications to maintain glucose within target range  - Assess nutritional intake and initiate nutrition service referral as needed  Outcome: Progressing     Problem: SKIN/TISSUE INTEGRITY - ADULT  Goal: Skin integrity remains intact  Description: INTERVENTIONS  - Identify patients at risk for skin breakdown  - Assess and monitor skin integrity  - Assess and monitor nutrition and hydration status  - Monitor labs (i e  albumin)  - Assess for incontinence   - Turn and reposition patient  - Assist with mobility/ambulation  - Relieve pressure over bony prominences  - Avoid friction and shearing  - Provide appropriate hygiene as needed including keeping skin clean and dry  - Evaluate need for skin moisturizer/barrier cream  - Collaborate with interdisciplinary team (i e  Nutrition, Rehabilitation, etc )   - Patient/family teaching  Outcome: Progressing  Goal: Incision(s), wounds(s) or drain site(s) healing without S/S of infection  Description: INTERVENTIONS  - Assess and document risk factors for skin impairment   - Assess and document dressing, incision, wound bed, drain sites and surrounding tissue  - Consider nutrition services referral as needed  - Oral mucous membranes remain intact  - Provide patient/ family education  Outcome: Progressing  Goal: Oral mucous membranes remain intact  Description: INTERVENTIONS  - Assess oral mucosa and hygiene practices  - Implement preventative oral hygiene regimen  - Implement oral medicated treatments as ordered  - Initiate Nutrition services referral as needed  Outcome: Progressing     Problem: HEMATOLOGIC - ADULT  Goal: Maintains hematologic stability  Description: INTERVENTIONS  - Assess for signs and symptoms of bleeding or hemorrhage  - Monitor labs  - Administer supportive blood products/factors as ordered and appropriate  Outcome: Progressing     Problem: MUSCULOSKELETAL - ADULT  Goal: Maintain or return mobility to safest level of function  Description: INTERVENTIONS:  - Assess patient's ability to carry out ADLs; assess patient's baseline for ADL function and identify physical deficits which impact ability to perform ADLs (bathing, care of mouth/teeth, toileting, grooming, dressing, etc )  - Assess/evaluate cause of self-care deficits   - Assess range of motion  - Assess patient's mobility  - Assess patient's need for assistive devices and provide as appropriate  - Encourage maximum independence but intervene and supervise when necessary  - Involve family in performance of ADLs  - Assess for home care needs following discharge   - Consider OT consult to assist with ADL evaluation and planning for discharge  - Provide patient education as appropriate  Outcome: Progressing  Goal: Maintain proper alignment of affected body part  Description: INTERVENTIONS:  - Support, maintain and protect limb and body alignment  - Provide patient/ family with appropriate education  Outcome: Progressing     Problem: COPING  Goal: Pt/Family able to verbalize concerns and demonstrate effective coping strategies  Description: INTERVENTIONS:  - Assist patient/family to identify coping skills, available support systems and cultural and spiritual values  - Provide emotional support, including active listening and acknowledgement of concerns of patient and caregivers  - Reduce environmental stimuli, as able  - Provide patient education  - Assess for spiritual pain/suffering and initiate spiritual care, including notification of Pastoral Care or [place] based community as needed  - Assess effectiveness of coping strategies  Outcome: Progressing  Goal: Will report anxiety at manageable levels  Description: INTERVENTIONS:  - Administer medication as ordered  - Teach and encourage coping skills  - Provide emotional support  - Assess patient/family for anxiety and ability to cope  Outcome: Progressing     Problem: Nutrition/Hydration-ADULT  Goal: Nutrient/Hydration intake appropriate for improving, restoring or maintaining nutritional needs  Description: Monitor and assess patient's nutrition/hydration status for malnutrition  Collaborate with interdisciplinary team and initiate plan and interventions as ordered  Monitor patient's weight and dietary intake as ordered or per policy  Utilize nutrition screening tool and intervene as necessary  Determine patient's food preferences and provide high-protein, high-caloric foods as appropriate       INTERVENTIONS:  - Monitor oral intake, urinary output, labs, and treatment plans  - Assess nutrition and hydration status and recommend course of action  - Evaluate amount of meals eaten  - Assist patient with eating if necessary   - Allow adequate time for meals  - Recommend/ encourage appropriate diets, oral nutritional supplements, and vitamin/mineral supplements  - Order, calculate, and assess calorie counts as needed  - Recommend, monitor, and adjust tube feedings and TPN/PPN based on assessed needs  - Assess need for intravenous fluids  - Provide specific nutrition/hydration education as appropriate  - Include patient/family/caregiver in decisions related to nutrition  Outcome: Progressing     Problem: PAIN - ADULT  Goal: Verbalizes/displays adequate comfort level or baseline comfort level  Description: Interventions:  - Encourage patient to monitor pain and request assistance  - Assess pain using appropriate pain scale  - Administer analgesics based on type and severity of pain and evaluate response  - Implement non-pharmacological measures as appropriate and evaluate response  - Consider cultural and social influences on pain and pain management  - Notify physician/advanced practitioner if interventions unsuccessful or patient reports new pain  Outcome: Progressing     Problem: INFECTION - ADULT  Goal: Absence or prevention of progression during hospitalization  Description: INTERVENTIONS:  - Assess and monitor for signs and symptoms of infection  - Monitor lab/diagnostic results  - Monitor all insertion sites, i e  indwelling lines, tubes, and drains  - Monitor endotracheal if appropriate and nasal secretions for changes in amount and color  - Leon appropriate cooling/warming therapies per order  - Administer medications as ordered  - Instruct and encourage patient and family to use good hand hygiene technique  - Identify and instruct in appropriate isolation precautions for identified infection/condition  Outcome: Progressing     Problem: SAFETY ADULT  Goal: Patient will remain free of falls  Description: INTERVENTIONS:  - Assess patient frequently for physical needs  -  Identify cognitive and physical deficits and behaviors that affect risk of falls    -  Leon fall precautions as indicated by assessment   - Educate patient/family on patient safety including physical limitations  - Instruct patient to call for assistance with activity based on assessment  - Modify environment to reduce risk of injury  - Consider OT/PT consult to assist with strengthening/mobility  Outcome: Progressing  Goal: Maintain or return to baseline ADL function  Description: INTERVENTIONS:  -  Assess patient's ability to carry out ADLs; assess patient's baseline for ADL function and identify physical deficits which impact ability to perform ADLs (bathing, care of mouth/teeth, toileting, grooming, dressing, etc )  - Assess/evaluate cause of self-care deficits   - Assess range of motion  - Assess patient's mobility; develop plan if impaired  - Assess patient's need for assistive devices and provide as appropriate  - Encourage maximum independence but intervene and supervise when necessary  - Involve family in performance of ADLs  - Assess for home care needs following discharge   - Consider OT consult to assist with ADL evaluation and planning for discharge  - Provide patient education as appropriate  Outcome: Progressing  Goal: Maintain or return mobility status to optimal level  Description: INTERVENTIONS:  - Assess patient's baseline mobility status (ambulation, transfers, stairs, etc )    - Identify cognitive and physical deficits and behaviors that affect mobility  - Identify mobility aids required to assist with transfers and/or ambulation (gait belt, sit-to-stand, lift, walker, cane, etc )  - Pike fall precautions as indicated by assessment  - Record patient progress and toleration of activity level on Mobility SBAR; progress patient to next Phase/Stage  - Instruct patient to call for assistance with activity based on assessment  - Consider rehabilitation consult to assist with strengthening/weightbearing, etc   Outcome: Progressing     Problem: DISCHARGE PLANNING  Goal: Discharge to home or other facility with appropriate resources  Description: INTERVENTIONS:  - Identify barriers to discharge w/patient and caregiver  - Arrange for needed discharge resources and transportation as appropriate  - Identify discharge learning needs (meds, wound care, etc )  - Arrange for interpretive services to assist at discharge as needed  - Refer to Case Management Department for coordinating discharge planning if the patient needs post-hospital services based on physician/advanced practitioner order or complex needs related to functional status, cognitive ability, or social support system  Outcome: Progressing     Problem: Knowledge Deficit  Goal: Patient/family/caregiver demonstrates understanding of disease process, treatment plan, medications, and discharge instructions  Description: Complete learning assessment and assess knowledge base    Interventions:  - Provide teaching at level of understanding  - Provide teaching via preferred learning methods  Outcome: Progressing

## 2020-08-13 NOTE — UTILIZATION REVIEW
No records found matching the search options per Lula  Unable to submit thru [address] [facility] [place] x3    Initial Clinical Review    Admission: Date/Time/Statement:   Admission Orders (From admission, onward)     Ordered        08/12/20 2320  Inpatient Admission  Once                   Orders Placed This Encounter   Procedures    Inpatient Admission     Standing Status:   Standing     Number of Occurrences:   1     Order Specific Question:   Admitting Physician     Answer:   Brinda Hunt [[id number]]     Order Specific Question:   Level of Care     Answer:   Med Surg [[id number]]     Order Specific Question:   Estimated length of stay     Answer:   More than 2 Midnights     Order Specific Question:   Certification     Answer:   I certify that inpatient services are medically necessary for this patient for a duration of greater than two midnights  See H&P and MD Progress Notes for additional information about the patient's course of treatment  ED Arrival Information     Expected Arrival Acuity Means of Arrival Escorted By Service Admission Type    8/12/2020 8/12/2020 19:04 Urgent Ambulance [address] [facility] Ambulance Surgery-General Urgent    Arrival Complaint    Necrotizing Fascitis        Chief Complaint   Patient presents with    Wound Infection     Pt reoports pain in groin from a boil, pt transfered to [address] [place] [address] ED for red surgery evaluation     Assessment/Plan:   62y Male, transfer from [place] [address] to [place] ED presented with noted boil in his right groin yesterday, "pop" it and had been in uncontrolled pain since with redness  Wbc 11901  CT abdomen pelvis shows air in the soft tissues of his right groin  Admit Inpatient level of care for concerns for Necrotizing soft tissue infection of his right groin/pannus  To OR immediately for excisional debridement of his right groin/pannus  Intraoperative culture  Multiple IV antibiotics  On exam, Obese with pannus  Right groin with redness/induration, small opening, tender    8/13 Progress notes; S/p washout and debridement 8/13  Continue Iv antibiotics  Plan for OR today - EXCISIONAL DEBRIDEMENT, Possible vac placement, possible closure (Right Groin  NPO/ IV Fluids  Pain control  8/12 OR - S/P EXCISIONAL DEBRIDEMENT Right pannus/groin (Right)  Operative Findings:  Indurated right groin without involvement of pannus  Wound measurement: 7cm x 2 5 cm x 2cm    ED Triage Vitals   Temperature Pulse Respirations Blood Pressure SpO2   08/12/20 1921 08/12/20 1921 08/12/20 1921 08/12/20 1921 08/12/20 1921   (!) 97 4 °F (36 3 °C) (!) 117 18 148/70 95 %      Temp Source Heart Rate Source Patient Position - Orthostatic VS BP Location FiO2 (%)   08/12/20 1921 08/12/20 1921 08/13/20 0130 08/12/20 1921 --   Oral Monitor Lying Right arm       Pain Score       08/12/20 1921       Worst Possible Pain          Wt Readings from Last 1 Encounters:   08/13/20 (!) 145 kg (320 lb)     Additional Vital Signs:   08/13/20 07:08:58   97 5 °F (36 4 °C)   92   18   153/85   108   96 %         Lying    08/13/20 03:38:06   98 °F (36 7 °C)   99   21   157/85   109   96 %             08/13/20 0230   99 4 °F (37 4 °C)   99   16   157/87      97 %         Lying    08/13/20 0130   99 5 °F (37 5 °C)   100   16   165/87      96 %         Lying    08/13/20 00:32:57   97 2 °F (36 2 °C)Abnormal     101   17   168/94   119   97 %             08/13/20 0000      94   20   156/74      95 %   2 L/min   Nasal cannula       08/12/20 2345      100   18   158/81      93 %      None (Room air       Pertinent Labs/Diagnostic Test Results:   8/12 CT Abd/Pelvis -  Right groin soft tissue swelling suggesting infection  There is soft tissue air which could relate to a necrotizing fasciitis    Hepatic steatosis and hepatomegaly    Results from last 7 days   Lab Units 08/12/20 2004   SARS-COV-2  Negative     Results from last 7 days   Lab Units 08/13/20  0512 08/12/20  1530   WBC Thousand/uL 15 32* 17 70*   HEMOGLOBIN g/dL 12 9 15 8   HEMATOCRIT % 40 7 46 7   PLATELETS Thousands/uL 267  274 318   NEUTROS ABS Thousands/µL 12 30* 14 10*         Results from last 7 days   Lab Units 08/13/20  0512 08/12/20  1528   SODIUM mmol/L 138 130*   POTASSIUM mmol/L 4 0 4 1   CHLORIDE mmol/L 110* 94*   CO2 mmol/L 16* 18*   ANION GAP mmol/L 12 18*   BUN mg/dL 12 21   CREATININE mg/dL 0 60 0 86   EGFR ml/min/1 73sq m 110 95   CALCIUM mg/dL 7 9* 9 8     Results from last 7 days   Lab Units 08/12/20  1528   AST U/L 13   ALT U/L 22   ALK PHOS U/L 91   TOTAL PROTEIN g/dL 7 6   ALBUMIN g/dL 4 3   TOTAL BILIRUBIN mg/dL 1 20*     Results from last 7 days   Lab Units 08/13/20  1157 08/13/20  0930 08/12/20  2323 08/12/20  2218   POC GLUCOSE mg/dl 172* 197* 235* 262*     Results from last 7 days   Lab Units 08/13/20  0512 08/12/20  1528   GLUCOSE RANDOM mg/dL 261* 378*         Results from last 7 days   Lab Units 08/13/20  0958   HEMOGLOBIN A1C % 10 9*   EAG mg/dl 266     BETA-HYDROXYBUTYRATE   Date Value Ref Range Status   08/13/2020 2 3 (H) <0 6 mmol/L Final          Results from last 7 days   Lab Units 08/13/20  1219   PH KAMALA  7 347   PCO2 KAMALA mm Hg 33 3*   PO2 KAMALA mm Hg 82 7*   HCO3 KAMALA mmol/L 17 8*   BASE EXC KAMALA mmol/L -6 8   O2 CONTENT KAMALA ml/dL 17 9   O2 HGB, VENOUS % 93 5*             Results from last 7 days   Lab Units 08/12/20  1528   TROPONIN I ng/mL <0 03         Results from last 7 days   Lab Units 08/12/20  1528   PROTIME seconds 13 3   INR  1 02   PTT seconds 23             Results from last 7 days   Lab Units 08/12/20  1811 08/12/20  1528   LACTIC ACID mmol/L 1 4 2 3*       Results from last 7 days   Lab Units 08/12/20  1533   CLARITY UA  Clear   COLOR UA  Yellow   SPEC GRAV UA  1 025   PH UA  5 5   GLUCOSE UA mg/dl 3+*   KETONES UA mg/dl 80 (3+)*   BLOOD UA  Trace-lysed*   PROTEIN UA mg/dl 2+*   NITRITE UA  Negative   BILIRUBIN UA  1+*   UROBILINOGEN UA E U /dl 0 2   LEUKOCYTES UA  Negative   WBC UA /hpf 20-30*   RBC UA /hpf 0-1*   BACTERIA UA /hpf Occasional   EPITHELIAL CELLS WET PREP /hpf Occasional   MUCUS THREADS  Occasional*     Results from last 7 days   Lab Units 08/12/20  2232 08/12/20  1722 08/12/20  1717   BLOOD CULTURE   --  Received in Microbiology Lab  Culture in Progress  Received in Microbiology Lab  Culture in Progress  GRAM STAIN RESULT  Rare Polys*  Rare Gram positive cocci in pairs*  --   --      ED Treatment:   Medication Administration from 08/12/2020 1828 to 08/12/2020 2019     None        Past Medical History:   Diagnosis Date    CAD (coronary artery disease)     s/p CABG x 2 LIMA-LAD, VG- OM1 8/5/2013    Carotid duplex 08/05/2013    20-49% bilateral stenosis    Diabetes ([facility] )     History of echocardiogram 12/22/2016    EF 55% Mild LVH  Mild MR    Hyperlipidemia     Hypertension      Present on Admission:  **None**      Admitting Diagnosis: Necrotizing fasciitis ([facility] ) [M72 6]  Wound infection [T14  8XXA, L08 9]  Age/Sex: 62 y o  male     Admission Orders:  Scheduled Medications:  cefepime, 2,000 mg, Intravenous, Q12H  clindamycin, 900 mg, Intravenous, Q8H  heparin (porcine), 7,500 Units, Subcutaneous, Q8H DEANNA  insulin glargine, 10 Units, Subcutaneous, HS  insulin lispro, 2-12 Units, Subcutaneous, Q6H DEANNA  metroNIDAZOLE, 500 mg, Intravenous, Q8H  vancomycin, 20 mg/kg (Adjusted), Intravenous, Q12H      Continuous IV Infusions:  lactated ringers, 125 mL/hr, Intravenous, Continuous      PRN Meds:  HYDROmorphone, 0 2 mg, Intravenous, Q3H PRN  HYDROmorphone, 0 5 mg, Intravenous, Q3H PRN 8/13 x2  HYDROmorphone, 1 mg, Intravenous, Q3H PRN  ondansetron, 4 mg, Intravenous, Q6H PRN      NPO  Bld culture x2  Urine culture  Up as tolerated  IP CONSULT TO ENDOCRINOLOGY  IP CONSULT TO NUTRITION SERVICES    Network Utilization Review Department  [email or web address]  ATTENTION: Please call with any questions or concerns to [phone] and carefully listen to the prompts so that you are directed to the right person  All voicemails are confidential   Farrel Bodily all requests for admission clinical reviews, approved or denied determinations and any other requests to dedicated fax number below belonging to the campus where the patient is receiving treatment   List of dedicated fax numbers for the Facilities:  [address] DENIALS (Administrative/Medical Necessity) [phone]   [address] (Maternity/NICU/Pediatrics) [phone]   Carmel Robledo [phone]   Corinne Acuna [phone]   Bailey Prather [phone]   [facility] [phone]   [address] [phone]   [facility]  [phone]   [address] [facility]  [address] [facility] [address] [facility] [phone]

## 2020-08-13 NOTE — ANESTHESIA PREPROCEDURE EVALUATION
Procedure:  EXCISIONAL DEBRIDEMENT, Possible vac placement, possible closure (Right Groin)  CABGX2,CAD, MORBID OBESITY  Relevant Problems   CARDIO   (+) Benign essential HTN   (+) Mixed hyperlipidemia      ENDO   (+) Type 2 diabetes mellitus without complication, without long-term current use of insulin (HCC)     Stress test ordered but not completed    No recent ECHOs    Last ECHO 12/22/2016   EF 55% Mild LVH  Mild MR      Physical Exam    Airway    Mallampati score: III  TM Distance: >3 FB  Neck ROM: full     Dental   No notable dental hx     Cardiovascular  Rhythm: regular, Rate: normal, Cardiovascular exam normal    Pulmonary  Pulmonary exam normal Breath sounds clear to auscultation,     Other Findings  Very poor dentition, with multiple missing teeth  Teeth present are loose      Anesthesia Plan  ASA Score- 3     Anesthesia Type- general with ASA Monitors  Additional Monitors:   Airway Plan: ETT  Comment: Risks and benefits discussed with patient including possible PONV, sore throat, and possibility of rare anesthetic and surgical emergencies          Plan Factors-Exercise tolerance (METS): <4 METS  Chart reviewed  EKG reviewed  Existing labs reviewed  Patient summary reviewed  Patient is not a current smoker  Patient instructed to abstain from smoking on day of procedure  Patient did not smoke on day of surgery  Induction- intravenous  Postoperative Plan- Plan for postoperative opioid use  Planned trial extubation    Informed Consent- Anesthetic plan and risks discussed with patient  I personally reviewed this patient with the CRNA  Discussed and agreed on the Anesthesia Plan with the CRNA             Lab Results   Component Value Date    GLUC 261 (H) 08/13/2020    ALT 22 08/12/2020    AST 13 08/12/2020    BUN 12 08/13/2020    CALCIUM 7 9 (L) 08/13/2020     (H) 08/13/2020    CHOL 173 01/08/2014    CO2 16 (L) 08/13/2020    CREATININE 0 60 08/13/2020    HDL 45 01/08/2014 INR 1 02 08/12/2020    HCT 40 7 08/13/2020    HGB 12 9 08/13/2020    PROT 7 9 01/08/2014    HGBA1C 10 9 (H) 08/13/2020     08/13/2020     08/13/2020    K 4 0 08/13/2020     01/08/2014    TRIG 142 01/08/2014    WBC 15 32 (H) 08/13/2020

## 2020-08-13 NOTE — ANESTHESIA POSTPROCEDURE EVALUATION
Post-Op Assessment Note    CV Status:  Stable  Pain Score: 0    Pain management: adequate     Mental Status:  Alert and awake   Hydration Status:  Euvolemic   PONV Controlled:  Controlled   Airway Patency:  Patent      Post Op Vitals Reviewed: Yes      Staff: CRNA         No complications documented      BP   102/82   Temp  98 1   Pulse 99   Resp   16   SpO2   98 on FM

## 2020-08-14 LAB
25(OH)D3 SERPL-MCNC: 25.6 NG/ML (ref 30–100)
ANION GAP SERPL CALCULATED.3IONS-SCNC: 10 MMOL/L (ref 4–13)
BASOPHILS # BLD AUTO: 0.03 THOUSANDS/ΜL (ref 0–0.1)
BASOPHILS NFR BLD AUTO: 0 % (ref 0–1)
BUN SERPL-MCNC: 10 MG/DL (ref 5–25)
CALCIUM SERPL-MCNC: 8 MG/DL (ref 8.3–10.1)
CHLORIDE SERPL-SCNC: 110 MMOL/L (ref 100–108)
CO2 SERPL-SCNC: 18 MMOL/L (ref 21–32)
CREAT SERPL-MCNC: 0.66 MG/DL (ref 0.6–1.3)
EOSINOPHIL # BLD AUTO: 0 THOUSAND/ΜL (ref 0–0.61)
EOSINOPHIL NFR BLD AUTO: 0 % (ref 0–6)
ERYTHROCYTE [DISTWIDTH] IN BLOOD BY AUTOMATED COUNT: 12.8 % (ref 11.6–15.1)
GFR SERPL CREATININE-BSD FRML MDRD: 106 ML/MIN/1.73SQ M
GLUCOSE SERPL-MCNC: 164 MG/DL (ref 65–140)
GLUCOSE SERPL-MCNC: 174 MG/DL (ref 65–140)
GLUCOSE SERPL-MCNC: 209 MG/DL (ref 65–140)
GLUCOSE SERPL-MCNC: 214 MG/DL (ref 65–140)
GLUCOSE SERPL-MCNC: 251 MG/DL (ref 65–140)
HCT VFR BLD AUTO: 39.7 % (ref 36.5–49.3)
HGB BLD-MCNC: 12.9 G/DL (ref 12–17)
IMM GRANULOCYTES # BLD AUTO: 0.12 THOUSAND/UL (ref 0–0.2)
IMM GRANULOCYTES NFR BLD AUTO: 1 % (ref 0–2)
LYMPHOCYTES # BLD AUTO: 1.06 THOUSANDS/ΜL (ref 0.6–4.47)
LYMPHOCYTES NFR BLD AUTO: 8 % (ref 14–44)
MCH RBC QN AUTO: 28.8 PG (ref 26.8–34.3)
MCHC RBC AUTO-ENTMCNC: 32.5 G/DL (ref 31.4–37.4)
MCV RBC AUTO: 89 FL (ref 82–98)
MONOCYTES # BLD AUTO: 0.59 THOUSAND/ΜL (ref 0.17–1.22)
MONOCYTES NFR BLD AUTO: 4 % (ref 4–12)
NEUTROPHILS # BLD AUTO: 11.99 THOUSANDS/ΜL (ref 1.85–7.62)
NEUTS SEG NFR BLD AUTO: 87 % (ref 43–75)
NRBC BLD AUTO-RTO: 0 /100 WBCS
PLATELET # BLD AUTO: 270 THOUSANDS/UL (ref 149–390)
PMV BLD AUTO: 11.2 FL (ref 8.9–12.7)
POTASSIUM SERPL-SCNC: 4.4 MMOL/L (ref 3.5–5.3)
RBC # BLD AUTO: 4.48 MILLION/UL (ref 3.88–5.62)
SODIUM SERPL-SCNC: 138 MMOL/L (ref 136–145)
WBC # BLD AUTO: 13.79 THOUSAND/UL (ref 4.31–10.16)

## 2020-08-14 PROCEDURE — 82948 REAGENT STRIP/BLOOD GLUCOSE: CPT

## 2020-08-14 PROCEDURE — 82306 VITAMIN D 25 HYDROXY: CPT | Performed by: INTERNAL MEDICINE

## 2020-08-14 PROCEDURE — 94760 N-INVAS EAR/PLS OXIMETRY 1: CPT

## 2020-08-14 PROCEDURE — 80048 BASIC METABOLIC PNL TOTAL CA: CPT | Performed by: SURGERY

## 2020-08-14 PROCEDURE — NC001 PR NO CHARGE: Performed by: SURGERY

## 2020-08-14 PROCEDURE — 99232 SBSQ HOSP IP/OBS MODERATE 35: CPT | Performed by: SURGERY

## 2020-08-14 PROCEDURE — 99232 SBSQ HOSP IP/OBS MODERATE 35: CPT | Performed by: INTERNAL MEDICINE

## 2020-08-14 PROCEDURE — 85025 COMPLETE CBC W/AUTO DIFF WBC: CPT | Performed by: SURGERY

## 2020-08-14 RX ORDER — MELATONIN
5000 DAILY
Status: DISCONTINUED | OUTPATIENT
Start: 2020-08-14 | End: 2020-08-15 | Stop reason: HOSPADM

## 2020-08-14 RX ADMIN — CEFEPIME HYDROCHLORIDE 2000 MG: 2 INJECTION, POWDER, FOR SOLUTION INTRAVENOUS at 09:12

## 2020-08-14 RX ADMIN — CEFEPIME HYDROCHLORIDE 2000 MG: 2 INJECTION, POWDER, FOR SOLUTION INTRAVENOUS at 23:42

## 2020-08-14 RX ADMIN — INSULIN GLARGINE 30 UNITS: 100 INJECTION, SOLUTION SUBCUTANEOUS at 09:26

## 2020-08-14 RX ADMIN — METRONIDAZOLE 500 MG: 500 INJECTION, SOLUTION INTRAVENOUS at 17:59

## 2020-08-14 RX ADMIN — INSULIN LISPRO 10 UNITS: 100 INJECTION, SOLUTION INTRAVENOUS; SUBCUTANEOUS at 18:00

## 2020-08-14 RX ADMIN — METRONIDAZOLE 500 MG: 500 INJECTION, SOLUTION INTRAVENOUS at 01:25

## 2020-08-14 RX ADMIN — METRONIDAZOLE 500 MG: 500 INJECTION, SOLUTION INTRAVENOUS at 09:28

## 2020-08-14 RX ADMIN — HEPARIN SODIUM 7500 UNITS: 5000 INJECTION INTRAVENOUS; SUBCUTANEOUS at 06:50

## 2020-08-14 RX ADMIN — MELATONIN 5000 UNITS: at 09:35

## 2020-08-14 RX ADMIN — VANCOMYCIN HYDROCHLORIDE 1750 MG: 1 INJECTION, POWDER, LYOPHILIZED, FOR SOLUTION INTRAVENOUS at 20:49

## 2020-08-14 RX ADMIN — INSULIN LISPRO 10 UNITS: 100 INJECTION, SOLUTION INTRAVENOUS; SUBCUTANEOUS at 09:11

## 2020-08-14 RX ADMIN — INSULIN GLARGINE 10 UNITS: 100 INJECTION, SOLUTION SUBCUTANEOUS at 22:08

## 2020-08-14 RX ADMIN — VANCOMYCIN HYDROCHLORIDE 1750 MG: 1 INJECTION, POWDER, LYOPHILIZED, FOR SOLUTION INTRAVENOUS at 06:45

## 2020-08-14 RX ADMIN — HEPARIN SODIUM 7500 UNITS: 5000 INJECTION INTRAVENOUS; SUBCUTANEOUS at 13:47

## 2020-08-14 RX ADMIN — INSULIN LISPRO 10 UNITS: 100 INJECTION, SOLUTION INTRAVENOUS; SUBCUTANEOUS at 12:36

## 2020-08-14 RX ADMIN — HEPARIN SODIUM 7500 UNITS: 5000 INJECTION INTRAVENOUS; SUBCUTANEOUS at 22:08

## 2020-08-14 NOTE — RESPIRATORY THERAPY NOTE
resp care      08/14/20 0800   Non-Invasive Information   Resp Comments Pt states he has no known hx of ROXANN, but realizes he has many risk factors for ROXANN  Pt attempted to wear full face cpap mask last night but was unable to keep on due to fear of mask  Pt willing to attempt cpap again tonight  Will try nasal mask to see if pt tolerates better than full face mask

## 2020-08-14 NOTE — PROGRESS NOTES
Progress Note - Josephine Dailey 62 y o  male MRN: [id number]    [facility] Encounter: [id number]      CC: diabetes f/u    Subjective:   Josephine Dailey is a 62y o  year old male with type 2 diabetes presented with right groin abscess with suspicion of necrotizing fasciitis, status post a washout and debridement 8/12, status post washout closure and drain placement on 08/13  Feels well  No complaints  No hypoglycemia  Objective:     Vitals: Blood pressure 155/81, pulse 90, temperature 98 °F (36 7 °C), resp  rate 20, height 5' 4" (1 626 m), weight (!) 145 kg (320 lb), SpO2 97 %  ,Body mass index is 54 93 kg/m²  Intake/Output Summary (Last 24 hours) at 8/14/2020 1118  Last data filed at 8/14/2020 0701  Gross per 24 hour   Intake 1160 41 ml   Output 1872 5 ml   Net -712 09 ml       Physical Exam:  General Appearance: morbidly obese, awake, appears stated age and cooperative  Head: Normocephalic, without obvious abnormality, atraumatic  Extremities: LE edema  Skin: Skin color and temperature normal    Pulm: no labored breathing    Lab, Imaging and other studies: I have personally reviewed pertinent reports  POC Glucose (mg/dl)   Date Value   08/14/2020 164 (H)   08/13/2020 231 (H)   08/13/2020 210 (H)   08/13/2020 172 (H)   08/13/2020 172 (H)   08/13/2020 197 (H)   08/12/2020 235 (H)   08/12/2020 262 (H)       Assessment:    -type 2 diabetes mellitus with hyperglycemia, A1c 10 9 %  At home patient is on metformin 1000 mg in the morning and 500 mg in the afternoon   - groin abscess/ necrotizing fasciitis:  Status post washout, debridement and closure    Management per primary team     -vitamin-D insufficiency, vitamin-D level 25 6   -hypertension  -CAD status post CABG  -high suspicion for obstructive sleep apnea  Plan:  -continue Lantus 30 units qAM   -continue lispro 10 units with meals  -continue correctional insulin  -patient should be discharged on basal bolus insulin   -vitamin D supplementation 5000 units daily  -patient needs diabetic teaching  Upon Discharge, if Lantus is not the preferred Basal Insulin as per Insurance, use Lukas Chavarria, Basaglar, Levemir or Toujeo at same dose  Upon Discharge, if Humalog is not the preferred mealtime Insulin per Insurance, Use Novolog, [place], Admelog or Apidra at the same dose instead  Portions of the record may have been created with voice recognition software

## 2020-08-14 NOTE — PROGRESS NOTES
Progress Note - General Surgery   Fermin Dorantes 62 y o  male MRN: [id number]  Unit/Bed#: [facility] 624-01 Encounter: [id number]    Assessment:  61 yo male with hx of DM, CAD, CABG who presented with right groin abscess and imaging findings suggestive for NSTI  S/p washout and debridement 8/12  S/p washout,closure, and drain placement 8/13    VSS, Afebrile    Plan:  CCD2  Follow cultures  DVT ppx  OOB  Endocrine recs- Lantus 30, Humalog 10 with meals    Subjective/Objective     Subjective:   NAEO  Tolerated procedure well  Tolerating diet well  No complaints- encouraged oob  Objective:    Blood pressure 141/82, pulse 99, temperature 97 6 °F (36 4 °C), resp  rate 16, height 5' 4" (1 626 m), weight (!) 145 kg (320 lb), SpO2 97 %  ,Body mass index is 54 93 kg/m²  Intake/Output Summary (Last 24 hours) at 8/14/2020 0459  Last data filed at 8/14/2020 0250  Gross per 24 hour   Intake 1945 83 ml   Output 2220 ml   Net -274 17 ml       Invasive Devices     Peripheral Intravenous Line            Peripheral IV 08/13/20 Left Wrist less than 1 day    Peripheral IV 08/13/20 Right Hand less than 1 day          Drain            Urethral Catheter Latex 16 Fr  1 day    Closed/Suction Drain Right Groin Bulb less than 1 day                Physical Exam:   Gen:  NAD  HEENT: normocephalic, atraumatic  Neck supple  Trachea midline  Lungs: Normal respiratory effort  Abd: soft, nontender, dressings clean dry and intact  Skin: warm/ dry  Extremities: pulses 2+  Neuro:  AxO x3      Results from last 7 days   Lab Units 08/13/20  0512 08/12/20  1530   WBC Thousand/uL 15 32* 17 70*   HEMOGLOBIN g/dL 12 9 15 8   HEMATOCRIT % 40 7 46 7   PLATELETS Thousands/uL 267  274 318     Results from last 7 days   Lab Units 08/13/20  0512 08/12/20  1528   POTASSIUM mmol/L 4 0 4 1   CHLORIDE mmol/L 110* 94*   CO2 mmol/L 16* 18*   BUN mg/dL 12 21   CREATININE mg/dL 0 60 0 86   CALCIUM mg/dL 7 9* 9 8     Results from last 7 days   Lab Units 08/12/20  1528 INR  1 02   PTT seconds 23

## 2020-08-14 NOTE — ANESTHESIA POSTPROCEDURE EVALUATION
Post-Op Assessment Note    CV Status:  Stable  Pain Score: 0    Pain management: adequate     Mental Status:  Sleepy   Hydration Status:  Euvolemic   PONV Controlled:  Controlled   Airway Patency:  Patent and adequate      Post Op Vitals Reviewed: Yes      Staff: CRNA         No complications documented      BP   172/88   Temp   97 4   Pulse (P) 90 (08/13/20 2105)   Resp (P) 18 (08/13/20 2105)    SpO2 (P) 96 % (08/13/20 2105)

## 2020-08-14 NOTE — PROGRESS NOTES
Postop check - General Surgery   [facility] 62 [facility] MR[facility]: [id number]  Unit/Bed#: [facility] 624-01 Encounter: [id number]    Assessment:  62 M with concerns for necrotizing soft tissue infection of his right groin/pannus  S/p washout and debridement 8/13  Status post washout, closure, and drain placement right groin a 8/13        Plan:    Diet as tolerated  Follow cultures, titrate antibiotics  About of bed and ambulate  Pain control  DVT ppx    Subjective/Objective     Subjective:   Doing well postoperatively  Pain is well controlled  Denies nausea, vomiting, fever, chills  Has taken some p o , adequate urine output  Objective:    Blood pressure 142/75, pulse 86, temperature 99 °F (37 2 °C), resp  rate 20, height 5' 4" (1 626 m), weight (!) 145 kg (320 lb), SpO2 92 %  ,Body mass index is 54 93 kg/m²  Intake/Output Summary (Last 24 hours) at 8/14/2020 0016  Last data filed at 8/13/2020 2044  Gross per 24 hour   Intake 1995 83 ml   Output 1100 ml   Net 895 83 ml       Invasive Devices     Peripheral Intravenous Line            Peripheral IV 08/13/20 Left Wrist less than 1 day    Peripheral IV 08/13/20 Right Hand less than 1 day          Drain            Urethral Catheter Latex 16 Fr  1 day    Closed/Suction Drain Right Groin Bulb less than 1 day                Physical Exam:   Gen:  NAD  HEENT: normocephalic, atraumatic  Neck supple  Trachea midline  Lungs: Normal respiratory effort  Abd: soft, nontender, dressings clean dry and intact, drain in place right groin with serosanguineous output  Skin: warm/ dry  Extremities: pulses 2+  Neuro:  AxO x3      Results from last 7 days   Lab Units 08/13/20  0512 08/12/20  1530   WBC Thousand/uL 15 32* 17 70*   HEMOGLOBIN g/dL 12 9 15 8   HEMATOCRIT % 40 7 46 7   PLATELETS Thousands/uL 267  274 318     Results from last 7 days   Lab Units 08/13/20  0512 08/12/20  1528   POTASSIUM mmol/L 4 0 4 1   CHLORIDE mmol/L 110* 94*   CO2 mmol/L 16* 18*   BUN mg/dL 12 21 CREATININE mg/dL 0 60 0 86   CALCIUM mg/dL 7 9* 9 8     Results from last 7 days   Lab Units 08/12/20  1528   INR  1 02   PTT seconds 23

## 2020-08-14 NOTE — OP NOTE
OPERATIVE REPORT  PATIENT NAME: David Masters    :  1960  MRN: [id number]  Pt Location: BE OR ROOM 09    SURGERY DATE: 2020    Surgeon(s) and Role:     * Anjum Mccormick DO - Primary     * Omer Phoenix, MD - Assisting    Preop Diagnosis:  Necrotizing fasciitis ([address] ) [M72 6]    Post-Op Diagnosis Codes:     * Necrotizing fasciitis ([address] ) [M72 6]    Procedure(s) (LRB):  EXCISIONAL DEBRIDEMENT, placement of drain and closure of wound (Right)    Specimen(s):  * No specimens in log *    Estimated Blood Loss:   Minimal    Drains:  Closed/Suction Drain Right Groin Bulb (Active)   Number of days: 0       Urethral Catheter Latex 16 Fr  (Active)   Reasons to continue Urinary Catheter  Post-operative urological requirements 20 0900   Goal for Removal Voiding trial when ambulation improves 20 0900   Site Assessment Clean;Skin intact 20 09   Collection Container Standard drainage bag 20 09   Securement Method Securing device (Describe) 20 0900   Output (mL) 1000 mL 20 0557   Number of days: 1       Anesthesia Type:   Choice    Operative Indications:  Necrotizing fasciitis ([address] ) [M72 6]      Operative Findings:  Clean wound base  Inferior wound induration excised  10F flat white MOISES drain placed  Complications:   None    Procedure and Technique:  Patient identified in preop holding area using name, , MRN  Consent verified  Pt brought back to OR and placed in supine position on OR table  General endotracheal anesthesia administered  Antibiotics given  Patient placed in lithotomy position  Dressing taken downd  Area was prepped and draped in usual sterile fashion with betadine  Timeout was called and all were in agreement  The wound base was inspected and appeared clean  One small area of necrotic fat that was sharply debrided  The wound was irrigated and checked for hemostasis  No bleeding points identified   A 10F flat white MOISES drain was placed into the wound bed and externalized superiorly to the wound  Incision was closed with 2-0 nylon in vertical mattress sutures  Counts were correct  Patient tolerated the procedure well          I was present for the entire procedure    Patient Disposition:  PACU     SIGNATURE: Griffin Perez MD  DATE: August 13, 2020  TIME: 9:05 PM

## 2020-08-14 NOTE — QUICK NOTE
Nurse-Patient-Provider rounds were completed with the patient's nurse today, Kelly Griffin  We discussed the plan is to discontinue his urinary catheter and await voiding trial   Continue IV antibiotics with anticipated transition to oral antibiotics on discharge  Maintain surgical drain and continue local wound care  We reviewed all of the invasive devices/lines/telemetry orders  - Urinary catheter to be removed today  DVT Prophylaxis:  - Subcutaneous heparin and SCDs  Pain Assessment / Plan:  - Continue current analgesic regimen  Mobility Assessment / Plan:  - Activity as tolerated  Goals / Barriers for discharge:  - Anticipate discharge within the next 24 hours, and possibly later today pending void trial   - Case management following; case and discharge needs discussed  All questions and concerns were addressed  I spent greater than 13 minutes reviewing the plan with the patient and the nurse, and coordinating care for the day      John Paul Buenrostro PA-C  8/14/2020 09:54 AM

## 2020-08-14 NOTE — PLAN OF CARE
Problem: NEUROSENSORY - ADULT  Goal: Achieves stable or improved neurological status  Description: INTERVENTIONS  - Monitor and report changes in neurological status  - Monitor vital signs such as temperature, blood pressure, glucose, and any other labs ordered   - Initiate measures to prevent increased intracranial pressure  - Monitor for seizure activity and implement precautions if appropriate      Outcome: Progressing  Goal: Remains free of injury related to seizures activity  Description: INTERVENTIONS  - Maintain airway, patient safety  and administer oxygen as ordered  - Monitor patient for seizure activity, document and report duration and description of seizure to physician/advanced practitioner  - If seizure occurs,  ensure patient safety during seizure  - Reorient patient post seizure  - Seizure pads on all 4 side rails  - Instruct patient/family to notify RN of any seizure activity including if an aura is experienced  - Instruct patient/family to call for assistance with activity based on nursing assessment  - Administer anti-seizure medications if ordered    Outcome: Progressing  Goal: Achieves maximal functionality and self care  Description: INTERVENTIONS  - Monitor swallowing and airway patency with patient fatigue and changes in neurological status  - Encourage and assist patient to increase activity and self care     - Encourage visually impaired, hearing impaired and aphasic patients to use assistive/communication devices  Outcome: Progressing

## 2020-08-15 VITALS
DIASTOLIC BLOOD PRESSURE: 113 MMHG | HEIGHT: 64 IN | TEMPERATURE: 98.1 F | WEIGHT: 315 LBS | SYSTOLIC BLOOD PRESSURE: 164 MMHG | OXYGEN SATURATION: 97 % | HEART RATE: 88 BPM | BODY MASS INDEX: 53.78 KG/M2 | RESPIRATION RATE: 18 BRPM

## 2020-08-15 LAB
ANION GAP SERPL CALCULATED.3IONS-SCNC: 8 MMOL/L (ref 4–13)
BACTERIA SPEC ANAEROBE CULT: NORMAL
BACTERIA TISS AEROBE CULT: ABNORMAL
BACTERIA UR CULT: ABNORMAL
BASOPHILS # BLD AUTO: 0.05 THOUSANDS/ΜL (ref 0–0.1)
BASOPHILS NFR BLD AUTO: 1 % (ref 0–1)
BUN SERPL-MCNC: 10 MG/DL (ref 5–25)
CALCIUM SERPL-MCNC: 8.4 MG/DL (ref 8.3–10.1)
CHLORIDE SERPL-SCNC: 110 MMOL/L (ref 100–108)
CO2 SERPL-SCNC: 21 MMOL/L (ref 21–32)
CREAT SERPL-MCNC: 0.54 MG/DL (ref 0.6–1.3)
EOSINOPHIL # BLD AUTO: 0.42 THOUSAND/ΜL (ref 0–0.61)
EOSINOPHIL NFR BLD AUTO: 5 % (ref 0–6)
ERYTHROCYTE [DISTWIDTH] IN BLOOD BY AUTOMATED COUNT: 12.8 % (ref 11.6–15.1)
GFR SERPL CREATININE-BSD FRML MDRD: 115 ML/MIN/1.73SQ M
GLUCOSE SERPL-MCNC: 203 MG/DL (ref 65–140)
GLUCOSE SERPL-MCNC: 208 MG/DL (ref 65–140)
GLUCOSE SERPL-MCNC: 210 MG/DL (ref 65–140)
GRAM STN SPEC: ABNORMAL
GRAM STN SPEC: ABNORMAL
HCT VFR BLD AUTO: 39.7 % (ref 36.5–49.3)
HGB BLD-MCNC: 12.6 G/DL (ref 12–17)
IMM GRANULOCYTES # BLD AUTO: 0.07 THOUSAND/UL (ref 0–0.2)
IMM GRANULOCYTES NFR BLD AUTO: 1 % (ref 0–2)
LYMPHOCYTES # BLD AUTO: 2.15 THOUSANDS/ΜL (ref 0.6–4.47)
LYMPHOCYTES NFR BLD AUTO: 23 % (ref 14–44)
MCH RBC QN AUTO: 28.4 PG (ref 26.8–34.3)
MCHC RBC AUTO-ENTMCNC: 31.7 G/DL (ref 31.4–37.4)
MCV RBC AUTO: 89 FL (ref 82–98)
MONOCYTES # BLD AUTO: 0.65 THOUSAND/ΜL (ref 0.17–1.22)
MONOCYTES NFR BLD AUTO: 7 % (ref 4–12)
NEUTROPHILS # BLD AUTO: 6.05 THOUSANDS/ΜL (ref 1.85–7.62)
NEUTS SEG NFR BLD AUTO: 63 % (ref 43–75)
NRBC BLD AUTO-RTO: 0 /100 WBCS
PLATELET # BLD AUTO: 275 THOUSANDS/UL (ref 149–390)
PMV BLD AUTO: 11.7 FL (ref 8.9–12.7)
POTASSIUM SERPL-SCNC: 3.5 MMOL/L (ref 3.5–5.3)
RBC # BLD AUTO: 4.44 MILLION/UL (ref 3.88–5.62)
SODIUM SERPL-SCNC: 139 MMOL/L (ref 136–145)
WBC # BLD AUTO: 9.39 THOUSAND/UL (ref 4.31–10.16)

## 2020-08-15 PROCEDURE — 85025 COMPLETE CBC W/AUTO DIFF WBC: CPT | Performed by: SURGERY

## 2020-08-15 PROCEDURE — 80048 BASIC METABOLIC PNL TOTAL CA: CPT | Performed by: SURGERY

## 2020-08-15 PROCEDURE — NC001 PR NO CHARGE: Performed by: SURGERY

## 2020-08-15 PROCEDURE — 99238 HOSP IP/OBS DSCHRG MGMT 30/<: CPT | Performed by: SURGERY

## 2020-08-15 PROCEDURE — 82948 REAGENT STRIP/BLOOD GLUCOSE: CPT

## 2020-08-15 RX ORDER — CEPHALEXIN 500 MG/1
500 CAPSULE ORAL EVERY 6 HOURS SCHEDULED
Status: DISCONTINUED | OUTPATIENT
Start: 2020-08-15 | End: 2020-08-15 | Stop reason: HOSPADM

## 2020-08-15 RX ORDER — POTASSIUM CHLORIDE 20 MEQ/1
40 TABLET, EXTENDED RELEASE ORAL ONCE
Status: COMPLETED | OUTPATIENT
Start: 2020-08-15 | End: 2020-08-15

## 2020-08-15 RX ORDER — INSULIN GLARGINE 100 [IU]/ML
32 INJECTION, SOLUTION SUBCUTANEOUS EVERY MORNING
Qty: 5 PEN | Refills: 0 | Status: SHIPPED | OUTPATIENT
Start: 2020-08-15 | End: 2020-09-28 | Stop reason: SDUPTHER

## 2020-08-15 RX ORDER — OXYCODONE HYDROCHLORIDE 5 MG/1
5 TABLET ORAL EVERY 4 HOURS PRN
Status: DISCONTINUED | OUTPATIENT
Start: 2020-08-15 | End: 2020-08-15 | Stop reason: HOSPADM

## 2020-08-15 RX ORDER — CEPHALEXIN 500 MG/1
500 CAPSULE ORAL EVERY 6 HOURS SCHEDULED
Qty: 27 CAPSULE | Refills: 0 | Status: SHIPPED | OUTPATIENT
Start: 2020-08-15 | End: 2020-08-22

## 2020-08-15 RX ORDER — INSULIN GLARGINE 100 [IU]/ML
30 INJECTION, SOLUTION SUBCUTANEOUS EVERY MORNING
Status: DISCONTINUED | OUTPATIENT
Start: 2020-08-15 | End: 2020-08-15 | Stop reason: HOSPADM

## 2020-08-15 RX ORDER — OXYCODONE HYDROCHLORIDE 10 MG/1
10 TABLET ORAL EVERY 4 HOURS PRN
Status: DISCONTINUED | OUTPATIENT
Start: 2020-08-15 | End: 2020-08-15 | Stop reason: HOSPADM

## 2020-08-15 RX ORDER — INSULIN GLARGINE 100 [IU]/ML
32 INJECTION, SOLUTION SUBCUTANEOUS EVERY MORNING
Status: DISCONTINUED | OUTPATIENT
Start: 2020-08-15 | End: 2020-08-15

## 2020-08-15 RX ORDER — ACETAMINOPHEN 325 MG/1
650 TABLET ORAL EVERY 6 HOURS PRN
Status: DISCONTINUED | OUTPATIENT
Start: 2020-08-15 | End: 2020-08-15 | Stop reason: HOSPADM

## 2020-08-15 RX ADMIN — CEPHALEXIN 500 MG: 500 CAPSULE ORAL at 11:45

## 2020-08-15 RX ADMIN — POTASSIUM CHLORIDE 40 MEQ: 1500 TABLET, EXTENDED RELEASE ORAL at 11:45

## 2020-08-15 RX ADMIN — HEPARIN SODIUM 7500 UNITS: 5000 INJECTION INTRAVENOUS; SUBCUTANEOUS at 06:54

## 2020-08-15 RX ADMIN — INSULIN GLARGINE 30 UNITS: 100 INJECTION, SOLUTION SUBCUTANEOUS at 09:35

## 2020-08-15 RX ADMIN — METRONIDAZOLE 500 MG: 500 INJECTION, SOLUTION INTRAVENOUS at 06:43

## 2020-08-15 RX ADMIN — INSULIN LISPRO 10 UNITS: 100 INJECTION, SOLUTION INTRAVENOUS; SUBCUTANEOUS at 07:47

## 2020-08-15 RX ADMIN — VANCOMYCIN HYDROCHLORIDE 1750 MG: 1 INJECTION, POWDER, LYOPHILIZED, FOR SOLUTION INTRAVENOUS at 09:35

## 2020-08-15 RX ADMIN — MELATONIN 5000 UNITS: at 09:35

## 2020-08-15 NOTE — PROGRESS NOTES
Reviewed insulin administration with patient and wife, including drawing up, site selection, needle and vial use and storage  Patient and wife indicate understanding of same

## 2020-08-15 NOTE — DISCHARGE INSTRUCTIONS
Necrotizing Fasciitis   WHAT YOU NEED TO KNOW:   Necrotizing fasciitis is a rare bacterial infection that spreads quickly and destroys skin, fat, and muscle  It is also known as flesh-eating bacteria  Necrotizing fasciitis is a life-threatening infection that must be treated immediately  DISCHARGE INSTRUCTIONS:   Seek care immediately if:   · You have a fever and a new wound with redness, swelling, or pain  · You have flu-like symptoms within 24 hours of an injury  · You have dark blisters near your wound that drain black fluid  · The area around your wound is numb  · The skin around your wound becomes discolored or flaky  Contact your healthcare provider if:   · You have a sore throat  · You have questions or concerns about your condition or care  Medicines:   · Medicines  help treat your infection and reduce your pain  · Take your medicine as directed  Contact your healthcare provider if you think your medicine is not helping or if you have side effects  Tell him or her if you are allergic to any medicine  Keep a list of the medicines, vitamins, and herbs you take  Include the amounts, and when and why you take them  Bring the list or the pill bottles to follow-up visits  Carry your medicine list with you in case of an emergency  Follow up with your healthcare provider as directed:  Write down your questions so you remember to ask them during your visits  Nutrition:  A dietitian may help you create high-protein meal plans to help with wound healing  High-protein foods include lean meats, fish, low-fat dairy products, and beans  Physical therapy (PT):  You may need physical therapy to help improve movement and strength, and to decrease pain  PT also helps to promote blood flow and prevent clots  Help prevent another infection:   · Clean all wounds immediately  Use soap and water to clean even small breaks in your skin, such as minor cuts or blisters   Cover the wounds with a sterile bandage  · Wash your hands often  with soap and water  Wash your hands after you use the bathroom, cough, sneeze, or blow your nose  Wash your hands before you prepare food or eat  · Limit exposure to bacteria  Avoid people who are sick and have a sore throat  The bacteria that cause strep throat can also cause necrotizing fasciitis  © 2017 [employer]  Information is for End User's use only and may not be sold, redistributed or otherwise used for commercial purposes  All illustrations and images included in CareNotes® are the copyrighted property of [employer]  or [employer]  The above information is an  only  It is not intended as medical advice for individual conditions or treatments  Talk to your doctor, nurse or pharmacist before following any medical regimen to see if it is safe and effective for you

## 2020-08-15 NOTE — PROGRESS NOTES
Progress Note - General Surgery   Graham Thornton 62 y o  male MRN: [id number]  Unit/Bed#: [facility] 624-01 Encounter: [id number]    Assessment:  61 yo male with hx of DM, CAD, CABG who presented with right groin abscess and imaging findings suggestive for NSTI    - s/p washout and debridement 8/12  - s/p washout, closure, and drain placement 8/13    Plan:  CCD2  Transition to PO keflex for 7 days  D/c MOISES before discharge  Insulin per endocrine  D/c home today      Subjective/Objective     Subjective:   No acute events overnight, denies groin pain, afebrile  Objective:    Blood pressure (!) 164/113, pulse 88, temperature 98 1 °F (36 7 °C), resp  rate 18, height 5' 4" (1 626 m), weight (!) 145 kg (320 lb), SpO2 97 %  ,Body mass index is 54 93 kg/m²        Intake/Output Summary (Last 24 hours) at 8/15/2020 8651  Last data filed at 8/14/2020 2300  Gross per 24 hour   Intake 480 ml   Output 1505 ml   Net -1025 ml       Invasive Devices     Peripheral Intravenous Line            Peripheral IV 08/13/20 Right Hand 1 day          Drain            Closed/Suction Drain Right Groin Bulb 1 day                Physical Exam:   NAD, alert and oriented x3  Normocephalic, atraumatic  MMM, EOMI, PERRLA  Norm resp effort on RA  RRR  Abd soft, NT/ND  R groin wound cdi with MOISES serosang minimal drainage  No calf tenderness or peripheral edema  Motor/sensation intact in distal extremities  CN grossly intact  -rash/lesions        Results from last 7 days   Lab Units 08/15/20  0536 08/14/20  0523 08/13/20  0512   WBC Thousand/uL 9 39 13 79* 15 32*   HEMOGLOBIN g/dL 12 6 12 9 12 9   HEMATOCRIT % 39 7 39 7 40 7   PLATELETS Thousands/uL 275 270 267  274     Results from last 7 days   Lab Units 08/15/20  0536 08/14/20  0523 08/13/20  0512   POTASSIUM mmol/L 3 5 4 4 4 0   CHLORIDE mmol/L 110* 110* 110*   CO2 mmol/L 21 18* 16*   BUN mg/dL 10 10 12   CREATININE mg/dL 0 54* 0 66 0 60   CALCIUM mg/dL 8 4 8 0* 7 9*     Results from last 7 days   Lab Units 08/12/20  1528   INR  1 02   PTT seconds 23

## 2020-08-15 NOTE — DISCHARGE SUMMARY
Discharge Summary - Julianna Aparicio 62 y o  male MRN: [id number]    [facility] Encounter: [id number]    Admission Date:   Admission Orders (From admission, onward)     Ordered        08/12/20 2320  Inpatient Admission  Once                     Admitting Diagnosis: Necrotizing fasciitis ([facility] [address] ) [M72 6]  Wound infection [T14  8XXA, L08 9]    HPI: Per Dr Jessica Mckeon, "Julianna Aparicio is a 62 y o  male who presents with pmh diabetes, BMI 48, hyperlipidemia, coronary artery disease status post CABG he says he noticed a boil that involved in his right groin yesterday evening  He said he had his significant other "pop" the boil yesterday evening, and since that time he has had excruciating pain in the area, as well as redness  He has not been febrile at home, but says he feels warm now  He has no significant history of developing abscesses  [place] [address], CT abdomen pelvis shows air in the soft tissues of his right groin "    Procedures Performed: No orders of the defined types were placed in this encounter  Summary of Hospital Course: Patient was admitted to the surgical service and taken emergently to the operating room for incision/drainage and washout (8/12)  Intra-operative findings were more consistent with right groin abscess, possible early NSTI than fulminant necrotizing fasciitis  Patient was seen by Endocrinology on POD1 and started on insulin therapy (new start)  He was taken back for a planned second look later that same day, intra-operative findings at that time demonstrated a small area of necrotic fat which was excised and the wound was closed over a drain  He was transitioned to PO antibiotics in accordance to wound culture sensitivities  On 8/15 his WBC had normalized, he was afebrile, his drain output was minimal and subsequently removed and his glycemic control had improved enough for discharge on a basal bolus regimen and the remainder of his oral antibiotic course   He was provided surgical follow up with Dr Krystle Ortega for wound check and suture removal and Endocrine follow up as well  Complications: None    Discharge Diagnosis: Right groin abscess    Condition at Discharge: good     Discharge instructions/Information to patient and family:   See after visit summary for information provided to patient and family  Provisions for Follow-Up Care:  See after visit summary for information related to follow-up care and any pertinent home health orders  PCP: Ned Kapoor DO    Disposition: Home    Planned Readmission: No    Discharge Statement   I spent 30 minutes discharging the patient  This time was spent on the day of discharge  I had direct contact with the patient on the day of discharge  Additional documentation is required if more than 30 minutes were spent on discharge  Discharge Medications:  See after visit summary for reconciled discharge medications provided to patient and family

## 2020-08-15 NOTE — QUICK NOTE
Larry Escobedo is a 62y o  year old male with type 2 diabetes presented with right groin abscess with suspicion of necrotizing fasciitis, status post a washout and debridement 8/12, status post washout closure and drain placement on 08/13  Patient was recommended v lantus 30 units in am with 10 units Humalog with meals         8/14/2020 17:28 8/14/2020 21:07 8/15/2020 07:17   POC Glucose 209 (H) 251 (H) 210 (H)     Patient received once 10 units  ? glargine at bedtime   Will increase glargine to 32 and Humalog to 12 units before meals, and patient should be discharged on basal bolus insulin  Needs  teaching about insulin injections and glucometer

## 2020-08-15 NOTE — PLAN OF CARE
Problem: NEUROSENSORY - ADULT  Goal: Achieves stable or improved neurological status  Description: INTERVENTIONS  - Monitor and report changes in neurological status  - Monitor vital signs such as temperature, blood pressure, glucose, and any other labs ordered   - Initiate measures to prevent increased intracranial pressure  - Monitor for seizure activity and implement precautions if appropriate      Outcome: Progressing  Goal: Remains free of injury related to seizures activity  Description: INTERVENTIONS  - Maintain airway, patient safety  and administer oxygen as ordered  - Monitor patient for seizure activity, document and report duration and description of seizure to physician/advanced practitioner  - If seizure occurs,  ensure patient safety during seizure  - Reorient patient post seizure  - Seizure pads on all 4 side rails  - Instruct patient/family to notify RN of any seizure activity including if an aura is experienced  - Instruct patient/family to call for assistance with activity based on nursing assessment  - Administer anti-seizure medications if ordered    Outcome: Progressing  Goal: Achieves maximal functionality and self care  Description: INTERVENTIONS  - Monitor swallowing and airway patency with patient fatigue and changes in neurological status  - Encourage and assist patient to increase activity and self care     - Encourage visually impaired, hearing impaired and aphasic patients to use assistive/communication devices  Outcome: Progressing     Problem: CARDIOVASCULAR - ADULT  Goal: Maintains optimal cardiac output and hemodynamic stability  Description: INTERVENTIONS:  - Monitor I/O, vital signs and rhythm  - Monitor for S/S and trends of decreased cardiac output  - Administer and titrate ordered vasoactive medications to optimize hemodynamic stability  - Assess quality of pulses, skin color and temperature  - Assess for signs of decreased coronary artery perfusion  - Instruct patient to report change in severity of symptoms  Outcome: Progressing  Goal: Absence of cardiac dysrhythmias or at baseline rhythm  Description: INTERVENTIONS:  - Continuous cardiac monitoring, vital signs, obtain 12 lead EKG if ordered  - Administer antiarrhythmic and heart rate control medications as ordered  - Monitor electrolytes and administer replacement therapy as ordered  Outcome: Progressing     Problem: GASTROINTESTINAL - ADULT  Goal: Minimal or absence of nausea and/or vomiting  Description: INTERVENTIONS:  - Administer IV fluids if ordered to ensure adequate hydration  - Maintain NPO status until nausea and vomiting are resolved  - Nasogastric tube if ordered  - Administer ordered antiemetic medications as needed  - Provide nonpharmacologic comfort measures as appropriate  - Advance diet as tolerated, if ordered  - Consider nutrition services referral to assist patient with adequate nutrition and appropriate food choices  Outcome: Progressing  Goal: Maintains or returns to baseline bowel function  Description: INTERVENTIONS:  - Assess bowel function  - Encourage oral fluids to ensure adequate hydration  - Administer IV fluids if ordered to ensure adequate hydration  - Administer ordered medications as needed  - Encourage mobilization and activity  - Consider nutritional services referral to assist patient with adequate nutrition and appropriate food choices  Outcome: Progressing  Goal: Maintains adequate nutritional intake  Description: INTERVENTIONS:  - Monitor percentage of each meal consumed  - Identify factors contributing to decreased intake, treat as appropriate  - Assist with meals as needed  - Monitor I&O, weight, and lab values if indicated  - Obtain nutrition services referral as needed  Outcome: Progressing  Goal: Establish and maintain optimal ostomy function  Description: INTERVENTIONS:  - Assess bowel function  - Encourage oral fluids to ensure adequate hydration  - Administer IV fluids if ordered to ensure adequate hydration   - Administer ordered medications as needed  - Encourage mobilization and activity  - Nutrition services referral to assist patient with appropriate food choices  - Assess stoma site  - Consider wound care consult   Outcome: Progressing     Problem: GENITOURINARY - ADULT  Goal: Maintains or returns to baseline urinary function  Description: INTERVENTIONS:  - Assess urinary function  - Encourage oral fluids to ensure adequate hydration if ordered  - Administer IV fluids as ordered to ensure adequate hydration  - Administer ordered medications as needed  - Offer frequent toileting  - Follow urinary retention protocol if ordered  Outcome: Progressing  Goal: Absence of urinary retention  Description: INTERVENTIONS:  - Assess patients ability to void and empty bladder  - Monitor I/O  - Bladder scan as needed  - Discuss with physician/AP medications to alleviate retention as needed  - Discuss catheterization for long term situations as appropriate  Outcome: Progressing  Goal: Urinary catheter remains patent  Description: INTERVENTIONS:  - Assess patency of urinary catheter  - If patient has a chronic urena, consider changing catheter if non-functioning  - Follow guidelines for intermittent irrigation of non-functioning urinary catheter  Outcome: Progressing     Problem: METABOLIC, FLUID AND ELECTROLYTES - ADULT  Goal: Electrolytes maintained within normal limits  Description: INTERVENTIONS:  - Monitor labs and assess patient for signs and symptoms of electrolyte imbalances  - Administer electrolyte replacement as ordered  - Monitor response to electrolyte replacements, including repeat lab results as appropriate  - Instruct patient on fluid and nutrition as appropriate  Outcome: Progressing  Goal: Fluid balance maintained  Description: INTERVENTIONS:  - Monitor labs   - Monitor I/O and WT  - Instruct patient on fluid and nutrition as appropriate  - Assess for signs & symptoms of volume excess or deficit  Outcome: Progressing  Goal: Glucose maintained within target range  Description: INTERVENTIONS:  - Monitor Blood Glucose as ordered  - Assess for signs and symptoms of hyperglycemia and hypoglycemia  - Administer ordered medications to maintain glucose within target range  - Assess nutritional intake and initiate nutrition service referral as needed  Outcome: Progressing     Problem: SKIN/TISSUE INTEGRITY - ADULT  Goal: Skin integrity remains intact  Description: INTERVENTIONS  - Identify patients at risk for skin breakdown  - Assess and monitor skin integrity  - Assess and monitor nutrition and hydration status  - Monitor labs (i e  albumin)  - Assess for incontinence   - Turn and reposition patient  - Assist with mobility/ambulation  - Relieve pressure over bony prominences  - Avoid friction and shearing  - Provide appropriate hygiene as needed including keeping skin clean and dry  - Evaluate need for skin moisturizer/barrier cream  - Collaborate with interdisciplinary team (i e  Nutrition, Rehabilitation, etc )   - Patient/family teaching  Outcome: Progressing  Goal: Incision(s), wounds(s) or drain site(s) healing without S/S of infection  Description: INTERVENTIONS  - Assess and document risk factors for skin impairment   - Assess and document dressing, incision, wound bed, drain sites and surrounding tissue  - Consider nutrition services referral as needed  - Oral mucous membranes remain intact  - Provide patient/ family education  Outcome: Progressing  Goal: Oral mucous membranes remain intact  Description: INTERVENTIONS  - Assess oral mucosa and hygiene practices  - Implement preventative oral hygiene regimen  - Implement oral medicated treatments as ordered  - Initiate Nutrition services referral as needed  Outcome: Progressing     Problem: HEMATOLOGIC - ADULT  Goal: Maintains hematologic stability  Description: INTERVENTIONS  - Assess for signs and symptoms of bleeding or hemorrhage  - Monitor labs  - Administer supportive blood products/factors as ordered and appropriate  Outcome: Progressing     Problem: MUSCULOSKELETAL - ADULT  Goal: Maintain or return mobility to safest level of function  Description: INTERVENTIONS:  - Assess patient's ability to carry out ADLs; assess patient's baseline for ADL function and identify physical deficits which impact ability to perform ADLs (bathing, care of mouth/teeth, toileting, grooming, dressing, etc )  - Assess/evaluate cause of self-care deficits   - Assess range of motion  - Assess patient's mobility  - Assess patient's need for assistive devices and provide as appropriate  - Encourage maximum independence but intervene and supervise when necessary  - Involve family in performance of ADLs  - Assess for home care needs following discharge   - Consider OT consult to assist with ADL evaluation and planning for discharge  - Provide patient education as appropriate  Outcome: Progressing  Goal: Maintain proper alignment of affected body part  Description: INTERVENTIONS:  - Support, maintain and protect limb and body alignment  - Provide patient/ family with appropriate education  Outcome: Progressing     Problem: COPING  Goal: Pt/Family able to verbalize concerns and demonstrate effective coping strategies  Description: INTERVENTIONS:  - Assist patient/family to identify coping skills, available support systems and cultural and spiritual values  - Provide emotional support, including active listening and acknowledgement of concerns of patient and caregivers  - Reduce environmental stimuli, as able  - Provide patient education  - Assess for spiritual pain/suffering and initiate spiritual care, including notification of Pastoral Care or louann based community as needed  - Assess effectiveness of coping strategies  Outcome: Progressing  Goal: Will report anxiety at manageable levels  Description: INTERVENTIONS:  - Administer medication as ordered  - Teach and encourage coping skills  - Provide emotional support  - Assess patient/family for anxiety and ability to cope  Outcome: Progressing     Problem: Nutrition/Hydration-ADULT  Goal: Nutrient/Hydration intake appropriate for improving, restoring or maintaining nutritional needs  Description: Monitor and assess patient's nutrition/hydration status for malnutrition  Collaborate with interdisciplinary team and initiate plan and interventions as ordered  Monitor patient's weight and dietary intake as ordered or per policy  Utilize nutrition screening tool and intervene as necessary  Determine patient's food preferences and provide high-protein, high-caloric foods as appropriate       INTERVENTIONS:  - Monitor oral intake, urinary output, labs, and treatment plans  - Assess nutrition and hydration status and recommend course of action  - Evaluate amount of meals eaten  - Assist patient with eating if necessary   - Allow adequate time for meals  - Recommend/ encourage appropriate diets, oral nutritional supplements, and vitamin/mineral supplements  - Order, calculate, and assess calorie counts as needed  - Recommend, monitor, and adjust tube feedings and TPN/PPN based on assessed needs  - Assess need for intravenous fluids  - Provide specific nutrition/hydration education as appropriate  - Include patient/family/caregiver in decisions related to nutrition  Outcome: Progressing     Problem: PAIN - ADULT  Goal: Verbalizes/displays adequate comfort level or baseline comfort level  Description: Interventions:  - Encourage patient to monitor pain and request assistance  - Assess pain using appropriate pain scale  - Administer analgesics based on type and severity of pain and evaluate response  - Implement non-pharmacological measures as appropriate and evaluate response  - Consider cultural and social influences on pain and pain management  - Notify physician/advanced practitioner if interventions unsuccessful or patient reports new pain  Outcome: Progressing     Problem: INFECTION - ADULT  Goal: Absence or prevention of progression during hospitalization  Description: INTERVENTIONS:  - Assess and monitor for signs and symptoms of infection  - Monitor lab/diagnostic results  - Monitor all insertion sites, i e  indwelling lines, tubes, and drains  - Monitor endotracheal if appropriate and nasal secretions for changes in amount and color  - [place] appropriate cooling/warming therapies per order  - Administer medications as ordered  - Instruct and encourage patient and family to use good hand hygiene technique  - Identify and instruct in appropriate isolation precautions for identified infection/condition  Outcome: Progressing     Problem: SAFETY ADULT  Goal: Patient will remain free of falls  Description: INTERVENTIONS:  - Assess patient frequently for physical needs  -  Identify cognitive and physical deficits and behaviors that affect risk of falls    -  [place] fall precautions as indicated by assessment   - Educate patient/family on patient safety including physical limitations  - Instruct patient to call for assistance with activity based on assessment  - Modify environment to reduce risk of injury  - Consider OT/PT consult to assist with strengthening/mobility  Outcome: Progressing  Goal: Maintain or return to baseline ADL function  Description: INTERVENTIONS:  -  Assess patient's ability to carry out ADLs; assess patient's baseline for ADL function and identify physical deficits which impact ability to perform ADLs (bathing, care of mouth/teeth, toileting, grooming, dressing, etc )  - Assess/evaluate cause of self-care deficits   - Assess range of motion  - Assess patient's mobility; develop plan if impaired  - Assess patient's need for assistive devices and provide as appropriate  - Encourage maximum independence but intervene and supervise when necessary  - Involve family in performance of ADLs  - Assess for home care needs following discharge   - Consider OT consult to assist with ADL evaluation and planning for discharge  - Provide patient education as appropriate  Outcome: Progressing  Goal: Maintain or return mobility status to optimal level  Description: INTERVENTIONS:  - Assess patient's baseline mobility status (ambulation, transfers, stairs, etc )    - Identify cognitive and physical deficits and behaviors that affect mobility  - Identify mobility aids required to assist with transfers and/or ambulation (gait belt, sit-to-stand, lift, walker, cane, etc )  - [place] fall precautions as indicated by assessment  - Record patient progress and toleration of activity level on Mobility SBAR; progress patient to next Phase/Stage  - Instruct patient to call for assistance with activity based on assessment  - Consider rehabilitation consult to assist with strengthening/weightbearing, etc   Outcome: Progressing     Problem: DISCHARGE PLANNING  Goal: Discharge to home or other facility with appropriate resources  Description: INTERVENTIONS:  - Identify barriers to discharge w/patient and caregiver  - Arrange for needed discharge resources and transportation as appropriate  - Identify discharge learning needs (meds, wound care, etc )  - Arrange for interpretive services to assist at discharge as needed  - Refer to Case Management Department for coordinating discharge planning if the patient needs post-hospital services based on physician/advanced practitioner order or complex needs related to functional status, cognitive ability, or social support system  Outcome: Progressing     Problem: Knowledge Deficit  Goal: Patient/family/caregiver demonstrates understanding of disease process, treatment plan, medications, and discharge instructions  Description: Complete learning assessment and assess knowledge base    Interventions:  - Provide teaching at level of understanding  - Provide teaching via preferred learning methods  Outcome: Progressing Problem: Prexisting or High Potential for Compromised Skin Integrity  Goal: Skin integrity is maintained or improved  Description: INTERVENTIONS:  - Identify patients at risk for skin breakdown  - Assess and monitor skin integrity  - Assess and monitor nutrition and hydration status  - Monitor labs   - Assess for incontinence   - Turn and reposition patient  - Assist with mobility/ambulation  - Relieve pressure over bony prominences  - Avoid friction and shearing  - Provide appropriate hygiene as needed including keeping skin clean and dry  - Evaluate need for skin moisturizer/barrier cream  - Collaborate with interdisciplinary team   - Patient/family teaching  - Consider wound care consult   Outcome: Progressing     Problem: Potential for Falls  Goal: Patient will remain free of falls  Description: INTERVENTIONS:  - Assess patient frequently for physical needs  -  Identify cognitive and physical deficits and behaviors that affect risk of falls    -  [place] fall precautions as indicated by assessment   - Educate patient/family on patient safety including physical limitations  - Instruct patient to call for assistance with activity based on assessment  - Modify environment to reduce risk of injury  - Consider OT/PT consult to assist with strengthening/mobility  Outcome: Progressing

## 2020-08-18 ENCOUNTER — TELEPHONE (OUTPATIENT)
Dept: ENDOCRINOLOGY | Facility: CLINIC | Age: 60
End: 2020-08-18

## 2020-08-18 DIAGNOSIS — E10.9 TYPE 1 DIABETES MELLITUS WITHOUT COMPLICATION (HCC): Primary | ICD-10-CM

## 2020-08-18 LAB
BACTERIA BLD CULT: NORMAL
BACTERIA BLD CULT: NORMAL

## 2020-08-18 RX ORDER — BLOOD SUGAR DIAGNOSTIC
STRIP MISCELLANEOUS 4 TIMES DAILY
COMMUNITY
End: 2020-08-18 | Stop reason: SDUPTHER

## 2020-08-18 RX ORDER — BLOOD SUGAR DIAGNOSTIC
STRIP MISCELLANEOUS
Qty: 400 EACH | Refills: 3 | Status: SHIPPED | OUTPATIENT
Start: 2020-08-18 | End: 2020-09-23 | Stop reason: SDUPTHER

## 2020-08-18 RX ORDER — BLOOD SUGAR DIAGNOSTIC
STRIP MISCELLANEOUS
COMMUNITY
End: 2020-08-18 | Stop reason: SDUPTHER

## 2020-08-18 RX ORDER — BLOOD SUGAR DIAGNOSTIC
STRIP MISCELLANEOUS
Qty: 400 EACH | Refills: 3 | Status: SHIPPED | OUTPATIENT
Start: 2020-08-18

## 2020-08-18 NOTE — TELEPHONE ENCOUNTER
donavan was seen in the hospital by dr Ileana Hurt and stating that the syringes for his insulin and the [facility]  [facility] [address] [place] [facility]      Thank  You

## 2020-08-18 NOTE — TELEPHONE ENCOUNTER
Called pt and gathered info on what size pen needles and syringes he needed    I passed the Rx on to Gilma Cintron to approve

## 2020-08-20 NOTE — UTILIZATION REVIEW
[address]  Jamar Comp, [address]  [place]a   [phone]     Tax ID: [id number]         NPI: [id number]  Rawland Eisenmenger QSP#[id number] (FXC:[id number]) (XAQ: 62 y o  M) (Adm: 20 1904) Inpatient     BE [id number] [id number]             Demographics  Comment              Last edited by  on  at        Address:  Home Phone: Work Phone:  Mobile Phone:          [employer] [address] [place] [address] [phone]        SSN:  Insurance:  Marital Status:  [place]:            [facility]  /Civil Union  [facility]  74 Account [de-identified]       CVG-F/O  Precert Number  [address] Phone  Authorization Number       1   [address]     [id number]            Primary Visit Coverage (Effective 2018)          Payer  Plan  Group Number  Group Name       [facility]  [address] [facility]  [id number]                     Primary Visit Coverage Subscriber          Subscriber Name  Subscriber ID  Subscriber   Subscriber [facility]  [id number]  1960                      Admission Information       Current Information       Attending Provider  Admitting Provider  Admission Type  Admission Status        Sallie Murray DO  Urgent  Discharged (Confirmed)                  Admission Date/Time  Discharge Date/Time  Hospital Service  Auth/Cert Status       99/55  07:04 PM  08/15/20  02:29 PM  Surgery-General  Complete       [facility]  Unit  Room/Bed  Referring Provider       [address]  PPHP 624/PPHP 624-01  Priya Luque MD                  Diagnosis        Necrotizing fasciitis [facility])          Discharge Disposition  Discharge Destination       Home/Self Care  Home                    Attending NPI#    Sallie Murray  [[id number]]          Patient Diagnoses       Reasons for Admission  Coded Admission Diagnoses        *[facility] [M72 6]       necrotizing fascitis  Wound infection [T14  Shanell Walden, L08 9]            Surgery Information       ID  Date/Time  Status  All Surgeons  All Procedures  Location       [id number]  8/12/2020 2130  [facility] [facility] [address] MD Cristobal Goznalez MD  EXCISIONAL DEBRIDEMENT Right pannus/groin  BE MAIN OR       [id number]  8/13/2020 1930  Posted  DO Maxine Vargas MD  EXCISIONAL DEBRIDEMENT, placement of drain and closure of wound  BE MAIN

## 2020-08-24 NOTE — UTILIZATION REVIEW
Notification of Discharge  This is a Notification of Discharge from our facility [address]  Please be advised that this patient has been discharge from our facility  Below you will find the admission and discharge date and time including the patients disposition  PRESENTATION DATE: 8/12/2020  7:04 PM  OBS ADMISSION DATE:   IP ADMISSION DATE: 8/12/20 2320   DISCHARGE DATE: 8/15/2020  2:29 PM  DISPOSITION: Home/Self Care Home/Self Care   Admission Orders listed below:  Admission Orders (From admission, onward)     Ordered        08/12/20 2320  Inpatient Admission  Once                   Please contact the UR Department if additional information is required to close this patient's authorization/case  [address] Utilization Review Department  Main: [phone] x carefully listen to the prompts  All voicemails are confidential   [email or web address]  Send all requests for admission clinical reviews, approved or denied determinations and any other requests to dedicated fax number below belonging to the campus where the patient is receiving treatment   List of dedicated fax numbers:  [address] DENIALS (Administrative/Medical Necessity) [phone]   [address] (Maternity/NICU/Pediatrics) [phone]   Jc Burrows [phone]   Ro Thompson [phone]   Consuelo Fish [phone]   [facility] 15[address] [facility] [phone]   [facility]  [phone]   [address] [facility]  2401 [facility] [address] [facility] [phone]

## 2020-08-26 ENCOUNTER — OFFICE VISIT (OUTPATIENT)
Dept: SURGERY | Facility: CLINIC | Age: 60
End: 2020-08-26

## 2020-08-26 VITALS
SYSTOLIC BLOOD PRESSURE: 147 MMHG | TEMPERATURE: 98.4 F | BODY MASS INDEX: 53.78 KG/M2 | HEIGHT: 64 IN | HEART RATE: 83 BPM | DIASTOLIC BLOOD PRESSURE: 83 MMHG | WEIGHT: 315 LBS

## 2020-08-26 DIAGNOSIS — M79.89 NECROTIZING SOFT TISSUE INFECTION: Primary | ICD-10-CM

## 2020-08-26 PROCEDURE — 99024 POSTOP FOLLOW-UP VISIT: CPT | Performed by: SURGERY

## 2020-08-26 NOTE — PROGRESS NOTES
Assessment/Plan:    Necrotizing soft tissue infection  Status post incision drainage and debridement of necrotizing right groin wound  Overall doing well  Minimal drainage  Sutures were removed without complication  Small 3 mm dehiscence at the skin level and the distal portion of the incision  Steri-Strips placed  Wound does not appear infected  Continue local wound care keeping it clean  Follow-up in 2 weeks for wound check  Diagnoses and all orders for this visit:    Necrotizing soft tissue infection          Subjective:      Patient ID: Graham Thornton is a 62 y o  male  76-year-old morbidly obese gentleman with multiple medical problems presents today for postop check after undergoing incision and drainage debridement of necrotizing soft tissue infection of the right groin  Overall doing well  No nausea vomiting  No fevers or chills  No significant pain in the right groin  His wife states there has been no redness but small amount of drainage  The following portions of the patient's history were reviewed and updated as appropriate:   He  has a past medical history of CAD (coronary artery disease), Carotid duplex (08/05/2013), Diabetes ([facility] [address] ), History of echocardiogram (12/22/2016), Hyperlipidemia, and Hypertension  He   Patient Active Problem List    Diagnosis Date Noted    Necrotizing soft tissue infection 08/26/2020    Coronary artery disease involving coronary bypass graft x2 08/12/2020    CAD (coronary artery disease), autologous vein bypass graft 11/14/2018    Mixed hyperlipidemia 11/14/2018    Benign essential HTN 11/14/2018    Type 2 diabetes mellitus without complication, without long-term current use of insulin ([address] ) 11/14/2018    Morbid obesity ([facility] [address] ) 11/14/2018     He  has a past surgical history that includes Coronary artery bypass graft (08/05/2013); Cardiac catheterization (08/02/2013);  Wound debridement (Right, 8/12/2020); and Wound debridement (Right, 8/13/2020)  His family history is not on file  He  reports that he has quit smoking  He has quit using smokeless tobacco  He reports previous alcohol use  He reports previous drug use  Current Outpatient Medications   Medication Sig Dispense Refill    aspirin 81 MG tablet Take 1 tablet (81 mg total) by mouth daily      atorvastatin (LIPITOR) 80 mg tablet Take 80 mg by mouth daily      cyclobenzaprine (FLEXERIL) 10 mg tablet Take 1 tablet (10 mg total) by mouth 3 (three) times a day as needed for muscle spasms (Patient not taking: Reported on 8/13/2020) 30 tablet 0    ibuprofen (MOTRIN) 800 mg tablet Take 1 tablet (800 mg total) by mouth every 8 (eight) hours as needed for mild pain, moderate pain, fever or headaches 40 tablet 0    insulin glargine (Basaglar KwikPen) 100 units/mL injection pen Inject 32 Units under the skin every morning 5 pen 0    insulin lispro (HumaLOG) 100 units/mL injection Inject 12 Units under the skin 3 (three) times a day with meals 10 8 mL 0    Insulin Pen Needle (Pen Needles) 32G X 5 MM MISC Use 1 needle for every time insulin is injected 400 each 3    Insulin Syringe-Needle U-100 29G X 1/2" 0 5 ML MISC Use a clean syringe for every time you inject insulin 400 each 3    lisinopril (ZESTRIL) 5 mg tablet Take 1 tablet by mouth daily       metoprolol tartrate (LOPRESSOR) 25 mg tablet Take 1 tablet (25 mg total) by mouth 2 (two) times a day 180 tablet 5    nitroglycerin (NITROSTAT) 0 4 mg SL tablet Place 1 tablet under the tongue as needed      predniSONE 10 mg tablet 3 tabs BID x 4 days, 2 tabs BID x 3 days, 1 tab BID x 3 days (Patient not taking: Reported on 8/13/2020) 42 tablet 0    traMADol (ULTRAM) 50 mg tablet 50 mg as needed       No current facility-administered medications for this visit  He has No Known Allergies       Review of Systems   Constitutional: Negative  Skin: Positive for wound (Right groin)  Negative for color change, pallor and rash  Objective:      /83   Pulse 83   Temp 98 4 °F (36 9 °C)   Ht 5' 4" (1 626 m)   Wt (!) 144 kg (317 lb)   BMI 54 41 kg/m²          Physical Exam  Vitals signs reviewed  Constitutional:       Appearance: Normal appearance  He is not toxic-appearing or diaphoretic  Skin:     General: Skin is warm  Coloration: Skin is not jaundiced or pale  Findings: No bruising or erythema  Comments: Wound of the right groin in clean dry intact with sutures noted  No erythema  Scant serous drainage  Neurological:      Mental Status: He is alert  Procedure:    Right groin incision sutures removed without complication  After removal small dehiscence approximately 2-3 mm in the inferomedial portion of the incision  Scant serous drainage  Steri-Strips applied without complication

## 2020-08-26 NOTE — ASSESSMENT & PLAN NOTE
Status post incision drainage and debridement of necrotizing right groin wound  Overall doing well  Minimal drainage  Sutures were removed without complication  Small 3 mm dehiscence at the skin level and the distal portion of the incision  Steri-Strips placed  Wound does not appear infected  Continue local wound care keeping it clean  Follow-up in 2 weeks for wound check

## 2020-09-03 ENCOUNTER — OFFICE VISIT (OUTPATIENT)
Dept: ENDOCRINOLOGY | Facility: CLINIC | Age: 60
End: 2020-09-03
Payer: COMMERCIAL

## 2020-09-03 VITALS
DIASTOLIC BLOOD PRESSURE: 90 MMHG | SYSTOLIC BLOOD PRESSURE: 152 MMHG | WEIGHT: 315 LBS | BODY MASS INDEX: 53.78 KG/M2 | HEIGHT: 64 IN | HEART RATE: 77 BPM | TEMPERATURE: 97.3 F

## 2020-09-03 DIAGNOSIS — E11.9 TYPE 2 DIABETES MELLITUS WITHOUT COMPLICATION, WITHOUT LONG-TERM CURRENT USE OF INSULIN (HCC): Primary | ICD-10-CM

## 2020-09-03 PROCEDURE — 99215 OFFICE O/P EST HI 40 MIN: CPT | Performed by: INTERNAL MEDICINE

## 2020-09-03 NOTE — PROGRESS NOTES
New Patient Progress Note      Follow up after discharge from hospital:     Referring Provider  Elise Lewis Do  [address]  [place], [address]     History of Present Illness:   Ashley Lopez is a 62 y o  male with a history of type 2 diabetes with long term use of insulin since 2013 which was diagnosed when he underwent CABG  Initially started on metformin 1000 mg bid, until his recent admission in the hospital in august 2020 for Necrotizing fascitis of right groin  for which he undergone debridement, endocrinology consulted who started him on insulin as he has A1C greater than 11 and being hyperglycemic   Denies any hyperglycemia or hypoglycemia episodes needing admission  Denies claudication, stroke, but had CAD S/P CABG  Denies nephropathy, retinopathy or neuropathy  Current regimen:     Basaglar : 32 units daily and Humalog 12 units     Injects in: abdomen     further diabetic ROS:    he denies any polyuria, polydipsia, nocturia, blurry vision  Home blood glucose readings:   Before breakfast: mid 100 to 200  Before lunch: none   Before dinner: low 100 to mid 100  Bedtime: none     Diabetes education: NO  Date   Diet: he eats 3 meals per day,   Egg sandwich, muffin   Lunch salad ( ham )  Dinner; pieces of chicken and french fries  He takes  Once a day after supper Snack sugar free pudding, no juices     Activity: does not have Daily activity             Opthamology: yearly, last one last year  Podiatry: no     Has hypertension: followed by PCP; on ACE inhibitor  Has hyperlipidemia: followed by PCP; on statin - tolerating well, no myalgias      Thyroid disorders: none   History of pancreatitis: none     Patient Active Problem List   Diagnosis    CAD (coronary artery disease), autologous vein bypass graft    Mixed hyperlipidemia    Benign essential HTN    Type 2 diabetes mellitus without complication, without long-term current use of insulin ([facility] [address] )    Morbid obesity ([facility] [address] )  Coronary artery disease involving coronary bypass graft x2    Necrotizing soft tissue infection      Past Medical History:   Diagnosis Date    CAD (coronary artery disease)     s/p CABG x 2 LIMA-LAD, VG- OM1 8/5/2013    Carotid duplex 08/05/2013    20-49% bilateral stenosis    Diabetes ([facility] [address] )     History of echocardiogram 12/22/2016    EF 55% Mild LVH  Mild MR    Hyperlipidemia     Hypertension       Past Surgical History:   Procedure Laterality Date    CARDIAC CATHETERIZATION  08/02/2013    EF 50% Severe left main disease 90%, OM1 99%, 100% RCA (bypass scheduled)    CORONARY ARTERY BYPASS GRAFT  08/05/2013    CABG X2 LIMA-LAD, VG-OM1    WOUND DEBRIDEMENT Right 8/12/2020    Procedure: EXCISIONAL DEBRIDEMENT Right pannus/groin;  Surgeon: Sweetie Saenz MD;  Location: [facility];  Service: Trauma    WOUND DEBRIDEMENT Right 8/13/2020    Procedure: EXCISIONAL DEBRIDEMENT, placement of drain and closure of wound;  Surgeon: Dottie Ch DO;  Location: [facility];  Service: General      No family history on file    Social History     Tobacco Use    Smoking status: Former Smoker    Smokeless tobacco: Former User   Substance Use Topics    Alcohol use: Not Currently     Frequency: Never     Binge frequency: Never     No Known Allergies      Current Outpatient Medications:     aspirin 81 MG tablet, Take 1 tablet (81 mg total) by mouth daily, Disp: , Rfl:     atorvastatin (LIPITOR) 80 mg tablet, Take 80 mg by mouth daily, Disp: , Rfl:     insulin glargine (Basaglar KwikPen) 100 units/mL injection pen, Inject 32 Units under the skin every morning, Disp: 5 pen, Rfl: 0    insulin lispro (HumaLOG) 100 units/mL injection, Inject 12 Units under the skin 3 (three) times a day with meals, Disp: 10 8 mL, Rfl: 0    Insulin Pen Needle (Pen Needles) 32G X 5 MM MISC, Use 1 needle for every time insulin is injected, Disp: 400 each, Rfl: 3    Insulin Syringe-Needle U-100 29G X 1/2" 0 5 ML MISC, Use a clean syringe for every time you inject insulin, Disp: 400 each, Rfl: 3    lisinopril (ZESTRIL) 5 mg tablet, Take 1 tablet by mouth daily , Disp: , Rfl:     metoprolol tartrate (LOPRESSOR) 25 mg tablet, Take 1 tablet (25 mg total) by mouth 2 (two) times a day, Disp: 180 tablet, Rfl: 5    nitroglycerin (NITROSTAT) 0 4 mg SL tablet, Place 1 tablet under the tongue as needed, Disp: , Rfl:     traMADol (ULTRAM) 50 mg tablet, 50 mg as needed, Disp: , Rfl:     cyclobenzaprine (FLEXERIL) 10 mg tablet, Take 1 tablet (10 mg total) by mouth 3 (three) times a day as needed for muscle spasms (Patient not taking: Reported on 8/13/2020), Disp: 30 tablet, Rfl: 0    ibuprofen (MOTRIN) 800 mg tablet, Take 1 tablet (800 mg total) by mouth every 8 (eight) hours as needed for mild pain, moderate pain, fever or headaches (Patient not taking: Reported on 9/3/2020), Disp: 40 tablet, Rfl: 0    predniSONE 10 mg tablet, 3 tabs BID x 4 days, 2 tabs BID x 3 days, 1 tab BID x 3 days (Patient not taking: Reported on 8/13/2020), Disp: 42 tablet, Rfl: 0  Review of Systems   Constitutional: Negative for activity change, appetite change, chills, diaphoresis, fatigue, fever and unexpected weight change  HENT: Negative for congestion, drooling, ear discharge, ear pain, trouble swallowing and voice change  Eyes: Negative for photophobia, pain, discharge, redness, itching and visual disturbance  Respiratory: Negative for cough, choking, chest tightness, shortness of breath and wheezing  Cardiovascular: Negative for chest pain, palpitations and leg swelling  Gastrointestinal: Negative for abdominal distention, abdominal pain, blood in stool, diarrhea, nausea and vomiting  Endocrine: Negative for cold intolerance, heat intolerance, polydipsia, polyphagia and polyuria  Genitourinary: Negative for decreased urine volume, dysuria, flank pain, frequency, hematuria and urgency  Musculoskeletal: Negative for back pain, gait problem and myalgias  Skin: Negative for color change and rash  Neurological: Negative for dizziness, tremors, seizures, syncope, speech difficulty, weakness, numbness and headaches  Psychiatric/Behavioral: Negative for agitation  The patient is not nervous/anxious  Physical Exam:  Body mass index is 55 17 kg/m²  /90   Pulse 77   Temp (!) 97 3 °F (36 3 °C)   Ht 5' 4" (1 626 m)   Wt (!) 146 kg (321 lb 6 4 oz)   BMI 55 17 kg/m²    Wt Readings from Last 3 Encounters:   09/03/20 (!) 146 kg (321 lb 6 4 oz)   08/26/20 (!) 144 kg (317 lb)   08/13/20 (!) 145 kg (320 lb)       GEN: NAD, morbidly obese   E/n/m nl facies, hearing intact bilat, tongue midline, lips nl  Eyes: no stare or proptosis, nl lids and conjunctiva, EOMI  Neck: trachea midline, thyroid NT to palpation, nl in size, no nodules or neck masses noted, no cervical LAD  CV; heart reg rate s1s2 nl, no m/r/g appreciated, no MC  Resp: CTAB, good effort  Ab+BS  Neuro: no tremor, 2+ DTRs in BUE  MS: no c/c in digits, moves all 4 ext, nl muscle bulk, gait nl  Skin: warm and dry, no palmar erythemat  Psych: nl mood and affect, no gross lapses in memory    Right Foot/Ankle   Right Foot Inspection  Skin Exam: skin normal skin not intact, no dry skin, no warmth, no callus, no erythema, no maceration, no abnormal color, no pre-ulcer, no ulcer and no callus                          Toe Exam: ROM and strength within normal limitsno swelling, no tenderness, erythema and  no right toe deformity  Sensory   Vibration: intact  Proprioception: intact     Vascular  Capillary refills: < 3 seconds  The right DP pulse is 2+  The right PT pulse is 2+       Left Foot/Ankle  Left Foot Inspection  Skin Exam: skin normalskin not intact, no dry skin, no warmth, no erythema, no maceration, normal color, no pre-ulcer, no ulcer and no callus                         Toe Exam: ROM and strength within normal limitsno swelling, no tenderness, no erythema and no left toe deformity Sensory   Vibration: intact  Proprioception: intact  Monofilament: intact  Vascular  Capillary refills: < 3 seconds  The left DP pulse is 2+  The left PT pulse is 2+  Assign Risk Category:  No deformity present; No loss of protective sensation; No weak pulses       Risk: 0      Labs:   No components found for: HA1C  No components found for: GLU    Lab Results   Component Value Date    CREATININE 0 54 (L) 08/15/2020    CREATININE 0 66 08/14/2020    CREATININE 0 60 08/13/2020    BUN 10 08/15/2020     01/08/2014    K 3 5 08/15/2020     (H) 08/15/2020    CO2 21 08/15/2020     eGFR   Date Value Ref Range Status   08/15/2020 115 ml/min/1 73sq m Final     No components found for: [facility]    Lab Results   Component Value Date    CHOL 173 01/08/2014    HDL 45 01/08/2014    TRIG 142 01/08/2014       Lab Results   Component Value Date    ALT 22 08/12/2020    AST 13 08/12/2020    ALKPHOS 91 08/12/2020    BILITOT 0 7 01/08/2014         Assessment and plan;    63 yo male with PMH of HTN, morbid obesity and type 2 diabetes who was seen after recent hospital discharge for right groin abscess/ necrotizing fascitis,        Type 2 diabetes, with current use of insulin; uncontrolled  7 years history of type 2 diabetes, complicated with CAD s/p CABG, uncontrolled with Recent A1C of 10 9  Patient was started on 32 units of Basaglar and 10 units of Humalog during his recent admission for necrotizing fascitis  Reviewed his blood glucose logs showed better controlled blood glucose in his most reading FBS in low 100 to 160 and before dinner mostly below 40    He is compliant with his new regiment, and since discharge, he changed his diet,   Will continue current regiment with 32 units of Basaglar and 10 units of Humalog before each meal  Will add metformin 500 mg bid ( he tolerated metformin before ) and titer it up as neccessary   We encourage him to continue SMBG and perform it more often ( 3 times a day) and send us his logs in 2 weeks to adjust his regiment accordingly  We made a referral to diabetes educational class  He will benefit from GLP-1 agonist in the future considering his hx of CAD and morbid obesity, after recovering from the infection if diabetes is not controlled  He does not have history of pancreatitis and personal or familial history of MTC so he is a potential candidate  A1C, CMP, Mircroalbumin and lipid panel in 3 month    RTC in 3 month    Morbid obesity;  Counseling for weigh loss, diet and exercise given    HTN;  On lisinopril   Will check microalbumin      History of dyslipidemia;  Continue Lipitor  Lipid panel        Minal Lyle MD

## 2020-09-03 NOTE — ASSESSMENT & PLAN NOTE
Lab Results   Component Value Date    HGBA1C 10 9 (H) 08/13/2020     More often [employer]  Continue   3 month s with lab

## 2020-09-23 DIAGNOSIS — E11.9 TYPE 2 DIABETES MELLITUS WITHOUT COMPLICATION, WITHOUT LONG-TERM CURRENT USE OF INSULIN (HCC): ICD-10-CM

## 2020-09-23 DIAGNOSIS — E10.9 TYPE 1 DIABETES MELLITUS WITHOUT COMPLICATION (HCC): ICD-10-CM

## 2020-09-24 RX ORDER — IBUPROFEN 600 MG/1
1 TABLET ORAL ONCE AS NEEDED
Qty: 1 KIT | Refills: 1 | Status: SHIPPED | OUTPATIENT
Start: 2020-09-24

## 2020-09-24 RX ORDER — BLOOD SUGAR DIAGNOSTIC
STRIP MISCELLANEOUS
Qty: 400 EACH | Refills: 3 | Status: SHIPPED | OUTPATIENT
Start: 2020-09-24 | End: 2020-12-03

## 2020-09-28 DIAGNOSIS — E11.9 TYPE 2 DIABETES MELLITUS WITHOUT COMPLICATION, WITHOUT LONG-TERM CURRENT USE OF INSULIN (HCC): ICD-10-CM

## 2020-09-28 RX ORDER — INSULIN GLARGINE 100 [IU]/ML
32 INJECTION, SOLUTION SUBCUTANEOUS EVERY MORNING
Qty: 5 PEN | Refills: 2 | Status: SHIPPED | OUTPATIENT
Start: 2020-09-28 | End: 2020-12-03

## 2020-10-23 ENCOUNTER — OFFICE VISIT (OUTPATIENT)
Dept: CARDIOLOGY CLINIC | Facility: CLINIC | Age: 60
End: 2020-10-23
Payer: COMMERCIAL

## 2020-10-23 VITALS
SYSTOLIC BLOOD PRESSURE: 118 MMHG | WEIGHT: 315 LBS | DIASTOLIC BLOOD PRESSURE: 68 MMHG | BODY MASS INDEX: 53.78 KG/M2 | HEIGHT: 64 IN | HEART RATE: 62 BPM

## 2020-10-23 DIAGNOSIS — E78.2 MIXED HYPERLIPIDEMIA: Primary | ICD-10-CM

## 2020-10-23 DIAGNOSIS — E66.01 MORBID OBESITY (HCC): ICD-10-CM

## 2020-10-23 DIAGNOSIS — I10 BENIGN ESSENTIAL HTN: ICD-10-CM

## 2020-10-23 PROBLEM — I25.10 CAD (CORONARY ARTERY DISEASE): Status: ACTIVE | Noted: 2020-08-12

## 2020-10-23 PROCEDURE — 99214 OFFICE O/P EST MOD 30 MIN: CPT | Performed by: INTERNAL MEDICINE

## 2020-11-09 ENCOUNTER — TELEPHONE (OUTPATIENT)
Dept: ENDOCRINOLOGY | Facility: CLINIC | Age: 60
End: 2020-11-09

## 2020-11-10 ENCOUNTER — TELEPHONE (OUTPATIENT)
Dept: ENDOCRINOLOGY | Facility: CLINIC | Age: 60
End: 2020-11-10

## 2020-11-12 ENCOUNTER — TELEPHONE (OUTPATIENT)
Dept: ENDOCRINOLOGY | Facility: CLINIC | Age: 60
End: 2020-11-12

## 2020-11-13 DIAGNOSIS — E11.9 TYPE 2 DIABETES MELLITUS WITHOUT COMPLICATION, WITHOUT LONG-TERM CURRENT USE OF INSULIN (HCC): ICD-10-CM

## 2020-12-03 ENCOUNTER — OFFICE VISIT (OUTPATIENT)
Dept: ENDOCRINOLOGY | Facility: CLINIC | Age: 60
End: 2020-12-03
Payer: COMMERCIAL

## 2020-12-03 VITALS
DIASTOLIC BLOOD PRESSURE: 82 MMHG | SYSTOLIC BLOOD PRESSURE: 140 MMHG | HEIGHT: 64 IN | WEIGHT: 315 LBS | BODY MASS INDEX: 53.78 KG/M2 | HEART RATE: 66 BPM

## 2020-12-03 DIAGNOSIS — E11.9 TYPE 2 DIABETES MELLITUS WITHOUT COMPLICATION, WITHOUT LONG-TERM CURRENT USE OF INSULIN (HCC): Primary | ICD-10-CM

## 2020-12-03 DIAGNOSIS — E66.01 MORBID OBESITY (HCC): ICD-10-CM

## 2020-12-03 DIAGNOSIS — E78.2 MIXED HYPERLIPIDEMIA: ICD-10-CM

## 2020-12-03 DIAGNOSIS — I10 BENIGN ESSENTIAL HTN: ICD-10-CM

## 2020-12-03 LAB — SL AMB POCT HEMOGLOBIN AIC: 6.4 (ref ?–6.5)

## 2020-12-03 PROCEDURE — 83036 HEMOGLOBIN GLYCOSYLATED A1C: CPT

## 2020-12-03 PROCEDURE — 99214 OFFICE O/P EST MOD 30 MIN: CPT | Performed by: INTERNAL MEDICINE

## 2020-12-03 RX ORDER — DULAGLUTIDE 0.75 MG/.5ML
0.75 INJECTION, SOLUTION SUBCUTANEOUS WEEKLY
Qty: 12 PEN | Refills: 3 | Status: SHIPPED | OUTPATIENT
Start: 2020-12-03

## 2020-12-03 RX ORDER — EMPAGLIFLOZIN 10 MG/1
10 TABLET, FILM COATED ORAL EVERY MORNING
Qty: 30 TABLET | Refills: 3 | Status: SHIPPED | OUTPATIENT
Start: 2020-12-03 | End: 2021-01-02

## 2021-03-08 LAB
CREAT ?TM UR-SCNC: 92.8 UMOL/L
EXT MICROALBUMIN URINE RANDOM: 1.8
MICROALBUMIN/CREAT UR: 19.4 MG/G{CREAT}

## 2021-03-10 ENCOUNTER — TELEPHONE (OUTPATIENT)
Dept: ENDOCRINOLOGY | Facility: CLINIC | Age: 61
End: 2021-03-10

## 2021-03-10 NOTE — TELEPHONE ENCOUNTER
Bill service form was fax to [facility] lab medicine with DX correction for Vitamin D 25 Hydroxy

## 2021-08-02 ENCOUNTER — CONSULT (OUTPATIENT)
Dept: PAIN MEDICINE | Facility: CLINIC | Age: 61
End: 2021-08-02
Payer: COMMERCIAL

## 2021-08-02 ENCOUNTER — APPOINTMENT (OUTPATIENT)
Dept: RADIOLOGY | Facility: CLINIC | Age: 61
End: 2021-08-02
Payer: COMMERCIAL

## 2021-08-02 VITALS
HEIGHT: 64 IN | DIASTOLIC BLOOD PRESSURE: 83 MMHG | SYSTOLIC BLOOD PRESSURE: 150 MMHG | HEART RATE: 80 BPM | WEIGHT: 315 LBS | BODY MASS INDEX: 53.78 KG/M2

## 2021-08-02 DIAGNOSIS — Z79.891 LONG-TERM CURRENT USE OF OPIATE ANALGESIC: ICD-10-CM

## 2021-08-02 DIAGNOSIS — F11.20 UNCOMPLICATED OPIOID DEPENDENCE (HCC): ICD-10-CM

## 2021-08-02 DIAGNOSIS — M54.16 LUMBAR RADICULOPATHY: Primary | ICD-10-CM

## 2021-08-02 DIAGNOSIS — M54.16 LUMBAR RADICULOPATHY: ICD-10-CM

## 2021-08-02 DIAGNOSIS — E11.49 OTHER DIABETIC NEUROLOGICAL COMPLICATION ASSOCIATED WITH TYPE 2 DIABETES MELLITUS (HCC): ICD-10-CM

## 2021-08-02 DIAGNOSIS — G89.4 CHRONIC PAIN SYNDROME: ICD-10-CM

## 2021-08-02 PROCEDURE — 80305 DRUG TEST PRSMV DIR OPT OBS: CPT | Performed by: ANESTHESIOLOGY

## 2021-08-02 PROCEDURE — 72110 X-RAY EXAM L-2 SPINE 4/>VWS: CPT

## 2021-08-02 PROCEDURE — 99204 OFFICE O/P NEW MOD 45 MIN: CPT | Performed by: ANESTHESIOLOGY

## 2021-08-02 RX ORDER — METOPROLOL TARTRATE 50 MG/1
50 TABLET, FILM COATED ORAL EVERY 12 HOURS SCHEDULED
COMMUNITY

## 2021-08-02 RX ORDER — PREGABALIN 50 MG/1
50 CAPSULE ORAL 3 TIMES DAILY
Qty: 90 CAPSULE | Refills: 1 | Status: SHIPPED | OUTPATIENT
Start: 2021-08-02 | End: 2021-09-15 | Stop reason: SDUPTHER

## 2021-08-02 NOTE — PROGRESS NOTES
Assessment:  1  Lumbar radiculopathy    2  Other diabetic neurological complication associated with type 2 diabetes mellitus ([facility] [address] )        Plan:  81-year-old male with complaints of left hip pain and left leg pain with radicular symptoms with chronic pain syndrome secondary to severe osteoarthritis of left hip complicated by history of diabetes and coronary artery disease presents to office for initial consultation  Patient presents with both arthritic left hip pain in addition to radicular symptom in the L4 nerve root distribution of the left lower extremity  We will attempt to do conservative therapy of patient tries to decide on some significant lifestyle changes  We did discuss gastric bypass at this time patient would like to avoid any surgical intervention at this time      1  We will obtain an x-ray of the lumbar spine to better assess the degenerative changes that correlate patient's left lower extremity digger symptoms  2  We will trial patient on Lyrica 50 mg p o  T i d  For diabetic neuropathy  3  We will obtain a UDS at this office visit at next office visit we will start patient on a low-dose opioid of Norco 5/325 mg p o q h s   4  Follow-up in 4 weeks        There are risks associated with opioid medications, including dependence, addiction and tolerance  The patient understands and agrees to use these medications only as prescribed  Potential side effects of the medications include, but are not limited to, constipation, drowsiness, addiction, impaired judgment and risk of fatal overdose if not taken as prescribed  The patient was warned against driving while taking sedation medications  Sharing medications is a felony  At this point in time, the patient is showing no signs of addiction, abuse, diversion or suicidal ideation  Patient was informed of the is of operate heavy machinery while taking opioid medications      A urine drug screen was collected at today's office visit as part of our medication management protocol  The point of care testing results were appropriate for what was being prescribed  The specimen will be sent for confirmatory testing  The drug screen is medically necessary because the patient is either dependent on opioid medication or is being considered for opioid medication therapy and the results could impact ongoing or future treatment  The drug screen is to evaluate for the presences or absence of prescribed, non-prescribed, and/or illicit drugs/substances  [place] Prescription Drug Monitoring Program report was reviewed and was appropriate     History of Present Illness: The patient is a 62 y o  male who presents for consultation in regards to Hip Pain and Knee Pain  Symptoms have been present for 3 years  Symptoms began without any precipitating injury or trauma  Pain is reported to be 8 on the numeric rating scale  Symptoms are felt nearly constantly and worst in the evening  Symptoms are characterized as burning, shooting and throbbing  Symptoms are associated with left leg weakness  Aggravating factors include standing, bending, leaning forward, leaning bckward, walking and coughing/sneezing  Relieving factors include lying down and relaxation  No change in symptoms with kneeling, sitting, turning the head and bowel movements  Patient has not attempted any pain relieving modalities at this time  Medications to relieve symptoms include tramadol  Review of Systems:    Review of Systems   Constitutional: Negative for fever and unexpected weight change  HENT: Negative for trouble swallowing  Eyes: Negative for visual disturbance  Respiratory: Negative for shortness of breath and wheezing  Cardiovascular: Negative for chest pain and palpitations  Gastrointestinal: Negative for constipation, diarrhea, nausea and vomiting  Endocrine: Negative for cold intolerance, heat intolerance and polydipsia     Genitourinary: Negative for difficulty urinating and frequency  Musculoskeletal: Negative for arthralgias, gait problem, joint swelling and myalgias  Skin: Negative for rash  Neurological: Negative for dizziness, seizures, syncope, weakness and headaches  Hematological: Does not bruise/bleed easily  Psychiatric/Behavioral: Negative for dysphoric mood  All other systems reviewed and are negative  Past Medical History:   Diagnosis Date    CAD (coronary artery disease)     s/p CABG x 2 LIMA-LAD, VG- OM1 8/5/2013    Carotid duplex 08/05/2013    20-49% bilateral stenosis    Diabetes ([facility] [address] )     History of echocardiogram 12/22/2016    EF 55% Mild LVH  Mild MR    Hyperlipidemia     Hypertension        Past Surgical History:   Procedure Laterality Date    CARDIAC CATHETERIZATION  08/02/2013    EF 50% Severe left main disease 90%, OM1 99%, 100% RCA (bypass scheduled)    CORONARY ARTERY BYPASS GRAFT  08/05/2013    CABG X2 LIMA-LAD, VG-OM1    WOUND DEBRIDEMENT Right 8/12/2020    Procedure: EXCISIONAL DEBRIDEMENT Right pannus/groin;  Surgeon: Sherif Doyle MD;  Location: [facility];  Service: Trauma    WOUND DEBRIDEMENT Right 8/13/2020    Procedure: EXCISIONAL DEBRIDEMENT, placement of drain and closure of wound;  Surgeon: Booker Guzman DO;  Location: [facility];  Service: General       History reviewed  No pertinent family history      Social History     Occupational History    Not on file   Tobacco Use    Smoking status: Former Smoker    Smokeless tobacco: Former User   Vaping Use    Vaping Use: Never used   Substance and Sexual Activity    Alcohol use: Not Currently    Drug use: Not Currently    Sexual activity: Not on file         Current Outpatient Medications:     aspirin 81 MG tablet, Take 1 tablet (81 mg total) by mouth daily, Disp: , Rfl:     atorvastatin (LIPITOR) 80 mg tablet, Take 80 mg by mouth daily, Disp: , Rfl:     cyclobenzaprine (FLEXERIL) 10 mg tablet, Take 1 tablet (10 mg total) by mouth 3 (three) times a day as needed for muscle spasms, Disp: 30 tablet, Rfl: 0    Dulaglutide (Trulicity) 2 62 SARY/6 0XH SOPN, Inject 0 5 mL (0 75 mg total) under the skin once a week, Disp: 12 pen, Rfl: 3    Empagliflozin (Jardiance) 10 MG TABS, Take 1 tablet (10 mg total) by mouth every morning, Disp: 30 tablet, Rfl: 3    glucagon (Glucagon Emergency) 1 MG injection, Inject 1 mg under the skin once as needed for low blood sugar for up to 1 dose, Disp: 1 kit, Rfl: 1    ibuprofen (MOTRIN) 800 mg tablet, Take 1 tablet (800 mg total) by mouth every 8 (eight) hours as needed for mild pain, moderate pain, fever or headaches, Disp: 40 tablet, Rfl: 0    Insulin Pen Needle (Pen Needles) 32G X 5 MM MISC, Use 1 needle for every time insulin is injected, Disp: 400 each, Rfl: 3    lisinopril (ZESTRIL) 5 mg tablet, Take 1 tablet by mouth daily , Disp: , Rfl:     metFORMIN (GLUCOPHAGE) 500 mg tablet, Take 2 tablets (1,000 mg total) by mouth 2 (two) times a day with meals, Disp: 360 tablet, Rfl: 0    nitroglycerin (NITROSTAT) 0 4 mg SL tablet, Place 1 tablet under the tongue as needed, Disp: , Rfl:     traMADol (ULTRAM) 50 mg tablet, 50 mg as needed, Disp: , Rfl:     No Known Allergies    Physical Exam:    /83 (BP Location: Left arm, Patient Position: Sitting, Cuff Size: Large)   Pulse 80   Ht 5' 4" (1 626 m)   Wt (!) 149 kg (328 lb 14 4 oz)   BMI 56 46 kg/m²     Constitutional: normal, well developed, well nourished, alert, in no distress and non-toxic and no overt pain behavior  and obese morbidly  Eyes: anicteric  HEENT: grossly intact  Neck: supple, symmetric, trachea midline and no masses   Pulmonary:even and unlabored  Cardiovascular:No edema or pitting edema present  Skin:Normal without rashes or lesions and well hydrated  Psychiatric:Mood and affect appropriate  Neurologic:Cranial Nerves II-XII grossly intact  Musculoskeletal:normal     Lumbar/Sacral Spine examination demonstrates    Decreased range of motion lumbar spine with pain upon: flexion, lateral rotation to the left/right, and bending to the left/right  Bilateral lumbar paraspinals tender to palpation  Muscle spasms noted in the lumbar area bilaterally  4/5 left lower extremity strength in all muscle groups  Positive seated straight leg raise for bilateral lower extremities  Sensitivity to light touch intact bilateral lower extremities  2+ reflexes in the patella and Achilles  No ankle clonus     Imaging  No orders to display       No orders of the defined types were placed in this encounter

## 2021-08-02 NOTE — PATIENT INSTRUCTIONS
Opioid Dependence   WHAT YOU NEED TO KNOW:   What is opioid dependence? Opioids are medicines, such as morphine and codeine, used to treat pain  Dependence happens after you have used opioids regularly for a long period of time  Dependence means that your body gets used to how much medicine you take  Dependence is not the same as addiction  Addiction means that a person uses opioids to get high instead of using them to control pain  What are the signs and symptoms of opioid dependence? · You need more of the opioid to get the same amount of pain relief as you did when you first started taking it  · You have tried to use less opioid medicine but are not able to  · You have withdrawal symptoms when you take less of the opioid  What are the signs and symptoms of opioid withdrawal?  You may have the following signs and symptoms if you suddenly stop taking opioids or if you decrease the amount you normally take:  · Yawning     · Runny nose     · Nausea or vomiting     · Diarrhea     · Chills or goosebumps    · Muscle aches or cramps     · Anxiety  How is opioid dependence diagnosed? Your healthcare provider will do a physical exam  He will ask you questions about your symptoms and your use of opioids  He will also ask about your current and past use of other drugs and any family history of drug abuse  How is opioid dependence treated? You may be treated in a hospital or you may be treated as an outpatient  During detoxification (detox), healthcare providers will slowly decrease your dose of the opioid medicine you are dependent on  They may use another opioid medicine such as methadone to decrease symptoms of withdrawal  You may need to take this or another medicine for some time  Your healthcare provider will also replace the opioid with another pain medicine that is less likely to cause dependence  He may also suggest that you receive counseling and social support during treatment     What are the risks of opioid dependence? There is a risk of overdose during early treatment with methadone  You may become dependent on the medicines used to treat opioid dependence  Without treatment, you may develop other health problems or become addicted to opioids  Your risk of abusing alcohol and other drugs increases  You may also develop risky behaviors that can lead to an overdose, violence, and suicidal thoughts  When should I contact my healthcare provider? · Your speech is slurred  · You have difficulty staying awake  · You have nausea and vomiting  · You are easily upset or cry easily  · You have poor balance  · You have questions or concerns about your condition or care  When should I seek immediate care or call 911? · You feel lightheaded or faint  · You have a fast, slow, or irregular heartbeat  · You have a seizure  CARE AGREEMENT:   You have the right to help plan your care  Learn about your health condition and how it may be treated  Discuss treatment options with your caregivers to decide what care you want to receive  You always have the right to refuse treatment  The above information is an  only  It is not intended as medical advice for individual conditions or treatments  Talk to your doctor, nurse or pharmacist before following any medical regimen to see if it is safe and effective for you  © 2017 [address]  Information is for End User's use only and may not be sold, redistributed or otherwise used for commercial purposes  All illustrations and images included in CareNotes® are the copyrighted property of [employer] M [employer]  or Luis Enrique Mccarthy

## 2021-08-04 LAB
6MAM UR QL CFM: NEGATIVE NG/ML
7AMINOCLONAZEPAM UR QL CFM: NEGATIVE NG/ML
A-OH ALPRAZ UR QL CFM: NEGATIVE NG/ML
ACCEPTABLE CREAT UR QL: NORMAL MG/DL
ACCEPTIBLE SP GR UR QL: NORMAL
AMPHET UR QL CFM: NEGATIVE NG/ML
AMPHET UR QL CFM: NEGATIVE NG/ML
BUPRENORPHINE UR QL CFM: NEGATIVE NG/ML
BUTALBITAL UR QL CFM: NEGATIVE NG/ML
BZE UR QL CFM: NEGATIVE NG/ML
CODEINE UR QL CFM: NEGATIVE NG/ML
DESIPRAMINE UR QL CFM: NEGATIVE NG/ML
DESIPRAMINE UR QL CFM: NEGATIVE NG/ML
EDDP UR QL CFM: NEGATIVE NG/ML
ETHYL GLUCURONIDE UR QL CFM: NEGATIVE NG/ML
ETHYL SULFATE UR QL SCN: NEGATIVE NG/ML
FENTANYL UR QL CFM: NEGATIVE NG/ML
GLIADIN IGG SER IA-ACNC: NEGATIVE NG/ML
GLUCOSE 30M P 50 G LAC PO SERPL-MCNC: NEGATIVE NG/ML
HYDROCODONE UR QL CFM: NEGATIVE NG/ML
HYDROCODONE UR QL CFM: NEGATIVE NG/ML
HYDROMORPHONE UR QL CFM: NEGATIVE NG/ML
IMIPRAMINE UR QL CFM: NEGATIVE NG/ML
LORAZEPAM UR QL CFM: NEGATIVE NG/ML
MDMA UR QL CFM: NEGATIVE NG/ML
ME-PHENIDATE UR QL CFM: NEGATIVE NG/ML
MEPERIDINE UR QL CFM: NEGATIVE NG/ML
MEPHEDRONE UR QL CFM: NEGATIVE NG/ML
METHADONE UR QL CFM: NEGATIVE NG/ML
METHAMPHET UR QL CFM: NEGATIVE NG/ML
MORPHINE UR QL CFM: NEGATIVE NG/ML
MORPHINE UR QL CFM: NEGATIVE NG/ML
NITRITE UR QL: NORMAL UG/ML
NORBUPRENORPHINE UR QL CFM: NEGATIVE NG/ML
NORDIAZEPAM UR QL CFM: NEGATIVE NG/ML
NORFENTANYL UR QL CFM: NEGATIVE NG/ML
NORHYDROCODONE UR QL CFM: NEGATIVE NG/ML
NORHYDROCODONE UR QL CFM: NEGATIVE NG/ML
NORMEPERIDINE UR QL CFM: NEGATIVE NG/ML
NOROXYCODONE UR QL CFM: NEGATIVE NG/ML
OLANZAPINE QUANTIFICATION: NEGATIVE NG/ML
OPC-3373 QUANTIFICATION: NEGATIVE
OXAZEPAM UR QL CFM: NEGATIVE NG/ML
OXYCODONE UR QL CFM: NEGATIVE NG/ML
OXYMORPHONE UR QL CFM: NEGATIVE NG/ML
OXYMORPHONE UR QL CFM: NEGATIVE NG/ML
PCP UR QL CFM: NEGATIVE NG/ML
PHENOBARB UR QL CFM: NEGATIVE NG/ML
SECOBARBITAL UR QL CFM: NEGATIVE NG/ML
SL AMB 3-METHYL-FENTANYL QUANTIFICATION: NORMAL NG/ML
SL AMB 4-ANPP QUANTIFICATION: NORMAL NG/ML
SL AMB 4-FIBF QUANTIFICATION: NORMAL NG/ML
SL AMB 5F-ADB-M7 METABOLITE QUANTIFICATION: NEGATIVE NG/ML
SL AMB 7-OH-MITRAGYNINE (KRATOM ALKALOID) QUANTIFICATION: NEGATIVE NG/ML
SL AMB AB-FUBINACA-M3 METABOLITE QUANTIFICATION: NEGATIVE NG/ML
SL AMB ACETYL FENTANYL QUANTIFICATION: NORMAL NG/ML
SL AMB ACETYL NORFENTANYL QUANTIFICATION: NORMAL NG/ML
SL AMB ACRYL FENTANYL QUANTIFICATION: NORMAL NG/ML
SL AMB BATH SALTS: NEGATIVE NG/ML
SL AMB BUTRYL FENTANYL QUANTIFICATION: NORMAL NG/ML
SL AMB CARFENTANIL QUANTIFICATION: NORMAL NG/ML
SL AMB CLOZAPINE QUANTIFICATION: NEGATIVE NG/ML
SL AMB CTHC (MARIJUANA METABOLITE) QUANTIFICATION: NEGATIVE NG/ML
SL AMB CYCLOPROPYL FENTANYL QUANTIFICATION: NORMAL NG/ML
SL AMB DEXTROMETHORPHAN QUANTIFICATION: NEGATIVE NG/ML
SL AMB DEXTRORPHAN (DEXTROMETHORPHAN METABOLITE) QUANT: NEGATIVE NG/ML
SL AMB DEXTRORPHAN (DEXTROMETHORPHAN METABOLITE) QUANT: NEGATIVE NG/ML
SL AMB FURANYL FENTANYL QUANTIFICATION: NORMAL NG/ML
SL AMB HALOPERIDOL  QUANTIFICATION: NEGATIVE NG/ML
SL AMB HALOPERIDOL METABOLITE QUANTIFICATION: NEGATIVE NG/ML
SL AMB HYDROXYRISPERIDONE QUANTIFICATION: NEGATIVE NG/ML
SL AMB JWH018 METABOLITE QUANTIFICATION: NEGATIVE NG/ML
SL AMB JWH073 METABOLITE QUANTIFICATION: NEGATIVE NG/ML
SL AMB MDMB-FUBINACA-M1 METABOLITE QUANTIFICATION: NEGATIVE NG/ML
SL AMB METHOXYACETYL FENTANYL QUANTIFICATION: NORMAL NG/ML
SL AMB METHYLONE QUANTIFICATION: NEGATIVE NG/ML
SL AMB N-DESMETHYL U-47700 QUANTIFICATION: NORMAL NG/ML
SL AMB N-DESMETHYL-TRAMADOL QUANTIFICATION: ABNORMAL NG/ML
SL AMB N-DESMETHYLCLOZAPINE QUANTIFICATION: NEGATIVE NG/ML
SL AMB NORQUETIAPINE QUANTIFICATION: NEGATIVE NG/ML
SL AMB PHENTERMINE QUANTIFICATION: NEGATIVE NG/ML
SL AMB QUETIAPINE QUANTIFICATION: NEGATIVE NG/ML
SL AMB RCS4 METABOLITE QUANTIFICATION: NEGATIVE NG/ML
SL AMB RISPERIDONE QUANTIFICATION: NEGATIVE NG/ML
SL AMB RITALINIC ACID QUANTIFICATION: NEGATIVE NG/ML
SL AMB U-47700 QUANTIFICATION: NORMAL NG/ML
SPECIMEN DRAWN SERPL: NEGATIVE NG/ML
SPECIMEN PH ACCEPTABLE UR: NORMAL
TAPENTADOL UR QL CFM: NEGATIVE NG/ML
TEMAZEPAM UR QL CFM: NEGATIVE NG/ML
TEMAZEPAM UR QL CFM: NEGATIVE NG/ML
TRAMADOL UR QL CFM: ABNORMAL NG/ML
URATE/CREAT 24H UR: ABNORMAL NG/ML

## 2021-08-06 ENCOUNTER — TELEPHONE (OUTPATIENT)
Dept: PAIN MEDICINE | Facility: MEDICAL CENTER | Age: 61
End: 2021-08-06

## 2021-08-19 DIAGNOSIS — E11.9 TYPE 2 DIABETES MELLITUS WITHOUT COMPLICATION, WITHOUT LONG-TERM CURRENT USE OF INSULIN (HCC): ICD-10-CM

## 2021-08-23 ENCOUNTER — TELEPHONE (OUTPATIENT)
Dept: PAIN MEDICINE | Facility: MEDICAL CENTER | Age: 61
End: 2021-08-23

## 2021-08-23 NOTE — TELEPHONE ENCOUNTER
----- Message from Salazar Black sent at 8/23/2021  1:26 PM EDT -----   Please call patient and let him know that the x-ray of his lumbar spine reveals mild multilevel spondylosis  There is severe degenerative joint disease involving the left hip, similar to 2020  Study    Please advise him to keep scheduled follow-up visit next week

## 2021-08-30 ENCOUNTER — OFFICE VISIT (OUTPATIENT)
Dept: PAIN MEDICINE | Facility: CLINIC | Age: 61
End: 2021-08-30
Payer: COMMERCIAL

## 2021-08-30 VITALS
HEART RATE: 86 BPM | HEIGHT: 64 IN | DIASTOLIC BLOOD PRESSURE: 82 MMHG | SYSTOLIC BLOOD PRESSURE: 148 MMHG | WEIGHT: 315 LBS | BODY MASS INDEX: 53.78 KG/M2

## 2021-08-30 DIAGNOSIS — M25.552 CHRONIC LEFT HIP PAIN: ICD-10-CM

## 2021-08-30 DIAGNOSIS — E11.40 DIABETIC NEUROPATHY, PAINFUL (HCC): ICD-10-CM

## 2021-08-30 DIAGNOSIS — G89.4 CHRONIC PAIN SYNDROME: Primary | ICD-10-CM

## 2021-08-30 DIAGNOSIS — G89.29 CHRONIC LEFT HIP PAIN: ICD-10-CM

## 2021-08-30 DIAGNOSIS — M16.12 PRIMARY OSTEOARTHRITIS OF LEFT HIP: ICD-10-CM

## 2021-08-30 PROCEDURE — 99214 OFFICE O/P EST MOD 30 MIN: CPT | Performed by: NURSE PRACTITIONER

## 2021-08-30 RX ORDER — HYDROCODONE BITARTRATE AND ACETAMINOPHEN 5; 325 MG/1; MG/1
1 TABLET ORAL
Qty: 14 TABLET | Refills: 0 | Status: SHIPPED | OUTPATIENT
Start: 2021-08-30 | End: 2021-09-15 | Stop reason: SDUPTHER

## 2021-08-30 NOTE — PATIENT INSTRUCTIONS
Opioid Safety   WHAT YOU NEED TO KNOW:   An opioid medicine is used to treat pain  Examples are oxycodone, morphine, fentanyl, or codeine  Pain control and management may help you rest, heal, and return to your daily activities  You and your family will receive information about how to manage your pain at home  The instructions will include what to do if you have side effects as your pain is managed  You will get information on how to handle opioid medicine safely  You will also get suggestions on how to control pain without opioids  It is important to follow all instructions so your pain is managed effectively  DISCHARGE INSTRUCTIONS:   Call your local emergency number (911 in the [place]), or have someone else call if:   · You have a seizure  · You cannot be woken  · You have trouble staying awake and your breathing is slow or shallow  · Your speech is slurred, or you are confused  · You are dizzy or stumble when you walk  Call your doctor, or have someone close to you call if:   · You are extremely drowsy, or you have trouble staying awake or speaking  · You have pale or clammy skin  · You have blue fingernails or lips  · Your heartbeat is slower than normal     · You cannot stop vomiting  · You have questions or concerns about your condition or care  Use opioids safely:   · Take prescribed opioids exactly as directed  Opioids come with directions based on the kind and how it is given  Talk to your healthcare provider or a pharmacist if you have any questions  Do not take more than the recommended amount  Too much can cause a life-threatening overdose  Do not continue to take it after your pain stops  You may develop tolerance  This means you keep needing higher doses to get the same effect  You may also develop opioid use disorder  This means you are not able to control your opioid use  · Do not give opioids to others or take opioids that belong to someone else    The kind or amount one person takes may not be right for another  The person you share them with may also be taking medicines that do not mix with opioids  He or she may drink alcohol or use other drugs that can cause life-threatening problems when mixed with opioids  · Do not mix opioids with other medicines or alcohol  The combination can cause an overdose, or cause you to stop breathing  Alcohol, sleeping pills, and medicines such as antihistamines can make you sleepy  A combination with opioids can lead to a coma  · Do not drive or operate heavy machinery after you use an opioid  You may feel drowsy or have trouble concentrating  You can injure yourself or others if you drive or use heavy machinery when you are not alert  Your provider or pharmacist can tell you how long to wait after a dose before you do these activities  · Talk to your healthcare provider if you have any side effects  Side effects include nausea, sleepiness, itching, and trouble thinking clearly  Your provider may need to make changes to the kind or amount of opioid you are taking  He or she can also help you find ways to prevent or relieve side effects  Manage constipation:  Constipation is the most common side effect of opioid medicine  Constipation is when you have hard, dry bowel movements, or you go longer than usual between bowel movements  Tell your healthcare provider about all changes in your bowel movements while you are taking opioids  He or she may recommend laxative medicine to help you have a bowel movement  He or she may also change the kind of opioid you are taking, or change when you take it  The following are more ways you can prevent or relieve constipation:  · Drink liquids as directed  You may need to drink extra liquids to help soften and move your bowels  Ask how much liquid to drink each day and which liquids are best for you  · Eat high-fiber foods    This may help decrease constipation by adding bulk to your bowel movements  High-fiber foods include fruits, vegetables, whole-grain breads and cereals, and beans  Your healthcare provider or dietitian can help you create a high-fiber meal plan  Your provider may also recommend a fiber supplement if you cannot get enough fiber from food  · Exercise regularly  Regular physical activity can help stimulate your intestines  Walking is a good exercise to prevent or relieve constipation  Ask which exercises are best for you  · Schedule a time each day to have a bowel movement  This may help train your body to have regular bowel movements  Bend forward while you are on the toilet to help move the bowel movement out  Sit on the toilet for at least 10 minutes, even if you do not have a bowel movement  Store opioids safely:   · Store opioids where others cannot easily get them  Keep them in a locked cabinet or secure area  Do not  keep them in a purse or other bag you carry with you  A person may be looking for something else and find the opioids  · Make sure opioids are stored out of the reach of children  A child can easily overdose on opioids  Opioids may look like candy to a small child  The best way to dispose of opioids: The laws vary by country and area  In the [place], the best way is to return the opioids through a take-back program  This program is offered by the [employer])  The following are options for using the program:  · Take the opioids to a JORJE collection site  The site is often a law enforcement center  Call your local law enforcement center for scheduled take-back days in your area  You will be given information on where to go if the collection site is in a different location  · Take the opioids to an approved pharmacy or hospital   A pharmacy or hospital may be set up as a collection site  You will need to ask if it is a JORJE collection site if you were not directed there   A pharmacy or doctor's office may not Some medicines may even work better than opioids, depending on the cause of your pain  Nonprescription medicines include NSAIDs (such as ibuprofen) and acetaminophen  Prescription medicines include muscle relaxers, antidepressants, and steroids  · Pain may be managed without any medicines  Some ways to relieve pain include massage, aromatherapy, or meditation  Physical or occupational therapy may also help  For more information:   · Drug Enforcement Administration  [address] [facility] [address]  Phone: 8- 312 - 853-8706  Web Address: [facility]/drug_[email or web address]    · [address] and Drug Administration  [place] , [address] [facility]  Phone: 7- 231 - 687-7418  Web Address: http://[employer]ing[employer]/  Follow up with your doctor or pain specialist as directed: You may need to have your dose adjusted  Your doctor or pain specialist can also help you find ways to manage pain without opioids  Write down your questions so you remember to ask them during your visits  © Copyright [employer] 2021 Information is for End User's use only and may not be sold, redistributed or otherwise used for commercial purposes  All illustrations and images included in CareNotes® are the copyrighted property of [employer]  or Giancarlo Cardona  The above information is an  only  It is not intended as medical advice for individual conditions or treatments  Talk to your doctor, nurse or pharmacist before following any medical regimen to see if it is safe and effective for you

## 2021-08-30 NOTE — PROGRESS NOTES
Assessment:  1  Chronic pain syndrome    2  Chronic left hip pain    3  Primary osteoarthritis of left hip    4  Diabetic neuropathy, painful ([address] )        Plan:   While the patient was in the office today, I did have a thorough conversation regarding their chronic pain syndrome, medication management, and treatment plan options  Patient was initially seen here for consultation on 08/02/2021  An x-ray of his lumbar spine was ordered which revealed some degenerative changes  It did reveal significant left hip osteoarthritis  Patient states that it was recommended that he have a hip replacement  However, he needs to lose weight  I offered to refer him to weight management  He declined  He was started on Lyrica titrating to 50 mg 3 times daily  Due to authorization issues with his insurance, it was just approved and he only started 3 days ago  A baseline urine drug screen was done in anticipation of starting him on the low dose of Norco 5/325 at bedtime  The urine drug screen was normal   I a prescribe Norco 5/3251 pill at bedtime  I provided him with a quantity of 14  We will follow-up in 2 weeks  Follow-up in 2 weeks  History of Present Illness: The patient is a 62 y o  male who presents for a follow up office visit in regards to Hip Pain (Left)  The patients current symptoms include   Complaints of low back pain, left hip pain  Pain in feet  Current pain level is a 9/10  Quality pain is described as burning, sharp, throbbing  Current pain medications includes:    Lyrica 50 mg 3 times daily which just started 3 days ago  The patient reports that this regimen is providing  0% pain relief  The patient is reporting no side effects from this pain medication regimen  I have personally reviewed and/or updated the patient's past medical history, past surgical history, family history, social history, current medications, allergies, and vital signs today           Review of Systems  Review of Systems   Constitutional: Negative for fatigue  HENT: Negative for ear pain and hearing loss  Eyes: Negative for redness  Respiratory: Negative for cough  Cardiovascular: Negative for leg swelling  Gastrointestinal: Negative for anal bleeding and rectal pain  Endocrine: Negative for polydipsia  Genitourinary: Negative for hematuria  Musculoskeletal: Positive for gait problem and myalgias (hip, upper thigh, knee, shin)  Negative for joint swelling  Skin: Negative for rash  Neurological: Positive for weakness  Negative for headaches  Hematological: Does not bruise/bleed easily  Psychiatric/Behavioral: Negative for behavioral problems and hallucinations  Past Medical History:   Diagnosis Date    CAD (coronary artery disease)     s/p CABG x 2 LIMA-LAD, VG- OM1 8/5/2013    Carotid duplex 08/05/2013    20-49% bilateral stenosis    Diabetes ([facility] [address] )     History of echocardiogram 12/22/2016    EF 55% Mild LVH  Mild MR    Hyperlipidemia     Hypertension        Past Surgical History:   Procedure Laterality Date    CARDIAC CATHETERIZATION  08/02/2013    EF 50% Severe left main disease 90%, OM1 99%, 100% RCA (bypass scheduled)    CORONARY ARTERY BYPASS GRAFT  08/05/2013    CABG X2 LIMA-LAD, VG-OM1    WOUND DEBRIDEMENT Right 8/12/2020    Procedure: EXCISIONAL DEBRIDEMENT Right pannus/groin;  Surgeon: Rosa Link MD;  [facility];  Service: Trauma    WOUND DEBRIDEMENT Right 8/13/2020    Procedure: EXCISIONAL DEBRIDEMENT, placement of drain and closure of wound;  Surgeon: Patricio Zepeda DO;  [facility];  Service: General       History reviewed  No pertinent family history      Social History     Occupational History    Not on file   Tobacco Use    Smoking status: Former Smoker    Smokeless tobacco: Former User   Vaping Use    Vaping Use: Never used   Substance and Sexual Activity    Alcohol use: Not Currently    Drug use: Not Currently   Gustavo Song Sexual activity: Not on file         Current Outpatient Medications:     aspirin 81 MG tablet, Take 1 tablet (81 mg total) by mouth daily, Disp: , Rfl:     atorvastatin (LIPITOR) 80 mg tablet, Take 80 mg by mouth daily, Disp: , Rfl:     ibuprofen (MOTRIN) 800 mg tablet, Take 1 tablet (800 mg total) by mouth every 8 (eight) hours as needed for mild pain, moderate pain, fever or headaches, Disp: 40 tablet, Rfl: 0    lisinopril (ZESTRIL) 5 mg tablet, Take 1 tablet by mouth daily , Disp: , Rfl:     metFORMIN (GLUCOPHAGE) 500 mg tablet, take 1 tablet by mouth twice a day with meals, Disp: 180 tablet, Rfl: 2    metoprolol tartrate (LOPRESSOR) 50 mg tablet, Take 50 mg by mouth every 12 (twelve) hours, Disp: , Rfl:     pregabalin (LYRICA) 50 mg capsule, Take 1 capsule (50 mg total) by mouth 3 (three) times a day, Disp: 90 capsule, Rfl: 1    cyclobenzaprine (FLEXERIL) 10 mg tablet, Take 1 tablet (10 mg total) by mouth 3 (three) times a day as needed for muscle spasms (Patient not taking: Reported on 8/2/2021), Disp: 30 tablet, Rfl: 0    Dulaglutide (Trulicity) 3 14 BZ/2 6ZM SOPN, Inject 0 5 mL (0 75 mg total) under the skin once a week (Patient not taking: Reported on 8/2/2021), Disp: 12 pen, Rfl: 3    Empagliflozin (Jardiance) 10 MG TABS, Take 1 tablet (10 mg total) by mouth every morning (Patient not taking: Reported on 8/30/2021), Disp: 30 tablet, Rfl: 3    glucagon (Glucagon Emergency) 1 MG injection, Inject 1 mg under the skin once as needed for low blood sugar for up to 1 dose (Patient not taking: Reported on 8/2/2021), Disp: 1 kit, Rfl: 1    HYDROcodone-acetaminophen (NORCO) 5-325 mg per tablet, Take 1 tablet by mouth daily at bedtime as needed for painMax Daily Amount: 1 tablet, Disp: 14 tablet, Rfl: 0    Insulin Pen Needle (Pen Needles) 32G X 5 MM MISC, Use 1 needle for every time insulin is injected (Patient not taking: Reported on 8/2/2021), Disp: 400 each, Rfl: 3    nitroglycerin (NITROSTAT) 0 4 mg SL tablet, Place 1 tablet under the tongue as needed, Disp: , Rfl:     traMADol (ULTRAM) 50 mg tablet, 50 mg as needed (Patient not taking: Reported on 8/2/2021), Disp: , Rfl:     No Known Allergies    Physical Exam:    /82   Pulse 86   Ht 5' 4" (1 626 m)   Wt (!) 145 kg (320 lb)   BMI 54 93 kg/m²     Constitutional:normal, well developed, well nourished, alert, in no distress and non-toxic and no overt pain behavior  and overweight  Eyes:anicteric  HEENT:grossly intact  Neck:supple, symmetric, trachea midline and no masses   Pulmonary:even and unlabored  Cardiovascular:No edema or pitting edema present  Skin:Normal without rashes or lesions and well hydrated  Psychiatric:Mood and affect appropriate  Neurologic:Cranial Nerves II-XII grossly intact  Musculoskeletal:in wheelchair    Imaging  Study Result    Narrative & Impression   LUMBAR SPINE     INDICATION:   Low back pain with radiculopathy      COMPARISON:  Hip radiographs dated 1/31/2020  CT of the abdomen/pelvis dated 8/12/2020      VIEWS:  XR SPINE LUMBAR MINIMUM 4 VIEWS NON INJURY        FINDINGS:     There are 5 lumbar type vertebral bodies  No compression deformities or other fractures  No focal lytic or blastic lesions  No pars defects      Normal lumbar lordosis without listhesis   No scoliosis      Though there are small multilevel discogenic osteophytes, disc heights are relatively maintained except for mild posterior disc height loss at L5-S1        Mild to moderate facet arthropathy at and below L2-3      Degenerative findings are similar to August 2020      Sacroiliac joints are unremarkable      Bowel gas pattern and visualized soft tissues are unremarkable      Severe degenerative joint space loss at the left hip--similar to 2020      IMPRESSION:     Mild multilevel spondylosis without acute osseous findings      Workstation performed: [id number]          No orders to display       No orders of the defined types were placed in this encounter

## 2021-09-15 ENCOUNTER — OFFICE VISIT (OUTPATIENT)
Dept: PAIN MEDICINE | Facility: CLINIC | Age: 61
End: 2021-09-15
Payer: COMMERCIAL

## 2021-09-15 VITALS
BODY MASS INDEX: 53.78 KG/M2 | HEART RATE: 80 BPM | HEIGHT: 64 IN | WEIGHT: 315 LBS | SYSTOLIC BLOOD PRESSURE: 142 MMHG | DIASTOLIC BLOOD PRESSURE: 82 MMHG

## 2021-09-15 DIAGNOSIS — M25.552 CHRONIC LEFT HIP PAIN: ICD-10-CM

## 2021-09-15 DIAGNOSIS — G89.29 CHRONIC LEFT HIP PAIN: ICD-10-CM

## 2021-09-15 DIAGNOSIS — G89.4 CHRONIC PAIN SYNDROME: Primary | ICD-10-CM

## 2021-09-15 DIAGNOSIS — E11.49 OTHER DIABETIC NEUROLOGICAL COMPLICATION ASSOCIATED WITH TYPE 2 DIABETES MELLITUS (HCC): ICD-10-CM

## 2021-09-15 PROBLEM — M54.16 LUMBAR RADICULOPATHY: Status: RESOLVED | Noted: 2021-09-15 | Resolved: 2021-09-15

## 2021-09-15 PROBLEM — M54.16 LUMBAR RADICULOPATHY: Status: ACTIVE | Noted: 2021-09-15

## 2021-09-15 PROCEDURE — 99214 OFFICE O/P EST MOD 30 MIN: CPT | Performed by: NURSE PRACTITIONER

## 2021-09-15 RX ORDER — PREGABALIN 75 MG/1
75 CAPSULE ORAL 3 TIMES DAILY
Qty: 90 CAPSULE | Refills: 1 | Status: SHIPPED | OUTPATIENT
Start: 2021-09-15 | End: 2021-10-20 | Stop reason: SDUPTHER

## 2021-09-15 RX ORDER — HYDROCODONE BITARTRATE AND ACETAMINOPHEN 5; 325 MG/1; MG/1
1 TABLET ORAL
Qty: 14 TABLET | Refills: 0 | Status: SHIPPED | OUTPATIENT
Start: 2021-09-15 | End: 2021-09-16 | Stop reason: SDUPTHER

## 2021-09-15 NOTE — PROGRESS NOTES
Assessment:  1  Chronic pain syndrome    2  Chronic left hip pain    3  Other diabetic neurological complication associated with type 2 diabetes mellitus ([address] )        Plan:   While the patient was in the office today, I did have a thorough conversation regarding their chronic pain syndrome, medication management, and treatment plan options  Patient is being seen for follow-up visit  He was last seen here on 08/30/2021 at which time he was started on Norco 5/325 at bedtime  Also, patient recently started Lyrica 50 mg 3 times daily  Overall, he reports that his pain is about 30% improved  At this point, we will increase Lyrica to 75 mg 3 times daily  Prescription was sent to his pharmacy  [facility] [id number] at bedtime  A prescription was sent to his pharmacy  The patient will follow-up in 1 month for medication prescription refill and reevaluation  The patient was advised to contact the office should their symptoms worsen in the interim  The patient was agreeable and verbalized an understanding  History of Present Illness: The patient is a 62 y o  male who presents for a follow up office visit in regards to Hip Pain  The patients current symptoms include  Complaints left hip pain  Current pain level is a 9/10  Quality pain is described as throbbing and pins and needles  Current pain medications includes:  Lyrica 50 mg 3 times daily, Norco 5/325 at bedtime    The patient reports that this regimen is providing  30% pain relief  The patient is reporting no side effects from this pain medication regimen  I have personally reviewed and/or updated the patient's past medical history, past surgical history, family history, social history, current medications, allergies, and vital signs today  Review of Systems  Review of Systems   Musculoskeletal: Positive for gait problem  All other systems reviewed and are negative          Past Medical History:   Diagnosis Date    CAD (coronary artery disease)     s/p CABG x 2 LIMA-LAD, VG- OM1 8/5/2013    Carotid duplex 08/05/2013    20-49% bilateral stenosis    Diabetes ([facility] [address] )     History of echocardiogram 12/22/2016    EF 55% Mild LVH  Mild MR    Hyperlipidemia     Hypertension        Past Surgical History:   Procedure Laterality Date    CARDIAC CATHETERIZATION  08/02/2013    EF 50% Severe left main disease 90%, OM1 99%, 100% RCA (bypass scheduled)    CORONARY ARTERY BYPASS GRAFT  08/05/2013    CABG X2 LIMA-LAD, VG-OM1    WOUND DEBRIDEMENT Right 8/12/2020    Procedure: EXCISIONAL DEBRIDEMENT Right pannus/groin;  Surgeon: Binh Carrasco MD;  [facility];  Service: Trauma    WOUND DEBRIDEMENT Right 8/13/2020    Procedure: EXCISIONAL DEBRIDEMENT, placement of drain and closure of wound;  Surgeon: Edgar Crespo DO;  [facility];  Service: General       History reviewed  No pertinent family history      Social History     Occupational History    Not on file   Tobacco Use    Smoking status: Former Smoker    Smokeless tobacco: Former User   Vaping Use    Vaping Use: Never used   Substance and Sexual Activity    Alcohol use: Not Currently    Drug use: Not Currently    Sexual activity: Not on file         Current Outpatient Medications:     aspirin 81 MG tablet, Take 1 tablet (81 mg total) by mouth daily, Disp: , Rfl:     atorvastatin (LIPITOR) 80 mg tablet, Take 80 mg by mouth daily, Disp: , Rfl:     HYDROcodone-acetaminophen (NORCO) 5-325 mg per tablet, Take 1 tablet by mouth daily at bedtime as needed for painMax Daily Amount: 1 tablet, Disp: 14 tablet, Rfl: 0    ibuprofen (MOTRIN) 800 mg tablet, Take 1 tablet (800 mg total) by mouth every 8 (eight) hours as needed for mild pain, moderate pain, fever or headaches, Disp: 40 tablet, Rfl: 0    lisinopril (ZESTRIL) 5 mg tablet, Take 1 tablet by mouth daily , Disp: , Rfl:     metFORMIN (GLUCOPHAGE) 500 mg tablet, take 1 tablet by mouth twice a day with meals, Disp: 180 tablet, Rfl: 2    metoprolol tartrate (LOPRESSOR) 50 mg tablet, Take 50 mg by mouth every 12 (twelve) hours, Disp: , Rfl:     nitroglycerin (NITROSTAT) 0 4 mg SL tablet, Place 1 tablet under the tongue as needed, Disp: , Rfl:     pregabalin (LYRICA) 75 mg capsule, Take 1 capsule (75 mg total) by mouth 3 (three) times a day, Disp: 90 capsule, Rfl: 1    cyclobenzaprine (FLEXERIL) 10 mg tablet, Take 1 tablet (10 mg total) by mouth 3 (three) times a day as needed for muscle spasms (Patient not taking: Reported on 8/2/2021), Disp: 30 tablet, Rfl: 0    Dulaglutide (Trulicity) 2 57 MK/3 7NZ SOPN, Inject 0 5 mL (0 75 mg total) under the skin once a week (Patient not taking: Reported on 8/2/2021), Disp: 12 pen, Rfl: 3    Empagliflozin (Jardiance) 10 MG TABS, Take 1 tablet (10 mg total) by mouth every morning (Patient not taking: Reported on 8/30/2021), Disp: 30 tablet, Rfl: 3    glucagon (Glucagon Emergency) 1 MG injection, Inject 1 mg under the skin once as needed for low blood sugar for up to 1 dose (Patient not taking: Reported on 8/2/2021), Disp: 1 kit, Rfl: 1    Insulin Pen Needle (Pen Needles) 32G X 5 MM MISC, Use 1 needle for every time insulin is injected (Patient not taking: Reported on 8/2/2021), Disp: 400 each, Rfl: 3    traMADol (ULTRAM) 50 mg tablet, 50 mg as needed (Patient not taking: Reported on 8/2/2021), Disp: , Rfl:     No Known Allergies    Physical Exam:    /82   Pulse 80   Ht 5' 4" (1 626 m)   Wt (!) 154 kg (339 lb)   BMI 58 19 kg/m²     Constitutional:normal, well developed, well nourished, alert, in no distress and non-toxic and no overt pain behavior   and overweight  Eyes:anicteric  HEENT:grossly intact  Neck:supple, symmetric, trachea midline and no masses   Pulmonary:even and unlabored  Cardiovascular:No edema or pitting edema present  Skin:Normal without rashes or lesions and well hydrated  Psychiatric:Mood and affect appropriate  Neurologic:Cranial Nerves II-XII grossly intact  Musculoskeletal:antalgic    Imaging  No orders to display       No orders of the defined types were placed in this encounter

## 2021-09-15 NOTE — TELEPHONE ENCOUNTER
Pt called in to let the doctor know that the medication HYDROcodone-acetaminophen (NORCO) 5-325 mg per tablet that was prescribed they only gave him 15 tablets and that will not be enough   Can he get additional tablets   Please be advised thank you    Pt can be reached @ [phone]

## 2021-09-16 RX ORDER — HYDROCODONE BITARTRATE AND ACETAMINOPHEN 5; 325 MG/1; MG/1
1 TABLET ORAL
Qty: 30 TABLET | Refills: 0 | Status: SHIPPED | OUTPATIENT
Start: 2021-09-16 | End: 2021-10-20

## 2021-09-16 NOTE — TELEPHONE ENCOUNTER
Office note from yesterday completed and patient was checked in and out yesterday, please clarify, thank you

## 2021-09-16 NOTE — TELEPHONE ENCOUNTER
No   It was not supposed to be a 30 day supply it was meant to be a 2 week supply  I just started him on this medication for the 1st time ever so, I only gave him a 2 week supply to try it  I will not fill it again until he is seen  He was supposed to be seen yesterday but no showed or canceled  He can certainly come in sooner for an office visit  But, he will need an office visit for the next refill

## 2021-09-16 NOTE — TELEPHONE ENCOUNTER
Enrico     I did! He already filled the prescription from yesterday, so I will send a prescription with a do not fill date of 9/29  That will be for 30 day supply :)   I thought he was somebody else

## 2021-09-16 NOTE — TELEPHONE ENCOUNTER
Patient was just started on hydrocodone at his visit on 08/30  I only gave him a 2 week  Supply to try it and to make sure that he would tolerate it  He had an appointment yesterday which he either no showed for or canceled  We can only refill this medication at an office visit

## 2021-09-29 ENCOUNTER — TELEPHONE (OUTPATIENT)
Dept: PAIN MEDICINE | Facility: CLINIC | Age: 61
End: 2021-09-29

## 2021-09-29 NOTE — TELEPHONE ENCOUNTER
Patient was not able to  his     HYDROcodone-acetaminophen (NORCO) 5-325 mg per tablet     Take 1 tablet by mouth daily at bedtime as needed for pain Do not fill until 9/29/2021Max Daily Amount: 1 tablet    Not available     Please advise    Thank you     [phone]

## 2021-09-30 NOTE — TELEPHONE ENCOUNTER
Attempted to call [facility] to clarify and called x2 and TC was dropped or hung up  Will attempt again later

## 2021-10-20 ENCOUNTER — OFFICE VISIT (OUTPATIENT)
Dept: PAIN MEDICINE | Facility: CLINIC | Age: 61
End: 2021-10-20
Payer: COMMERCIAL

## 2021-10-20 VITALS
DIASTOLIC BLOOD PRESSURE: 81 MMHG | HEART RATE: 98 BPM | WEIGHT: 315 LBS | SYSTOLIC BLOOD PRESSURE: 155 MMHG | HEIGHT: 64 IN | BODY MASS INDEX: 53.78 KG/M2

## 2021-10-20 DIAGNOSIS — M25.552 CHRONIC LEFT HIP PAIN: ICD-10-CM

## 2021-10-20 DIAGNOSIS — G89.4 CHRONIC PAIN SYNDROME: Primary | ICD-10-CM

## 2021-10-20 DIAGNOSIS — G89.29 CHRONIC LEFT HIP PAIN: ICD-10-CM

## 2021-10-20 DIAGNOSIS — E11.49 OTHER DIABETIC NEUROLOGICAL COMPLICATION ASSOCIATED WITH TYPE 2 DIABETES MELLITUS (HCC): ICD-10-CM

## 2021-10-20 DIAGNOSIS — M16.12 PRIMARY OSTEOARTHRITIS OF LEFT HIP: ICD-10-CM

## 2021-10-20 DIAGNOSIS — E66.01 MORBID OBESITY (HCC): ICD-10-CM

## 2021-10-20 PROCEDURE — 99214 OFFICE O/P EST MOD 30 MIN: CPT | Performed by: NURSE PRACTITIONER

## 2021-10-20 RX ORDER — HYDROCODONE BITARTRATE AND ACETAMINOPHEN 7.5; 325 MG/1; MG/1
1 TABLET ORAL
Qty: 30 TABLET | Refills: 0 | Status: SHIPPED | OUTPATIENT
Start: 2021-10-20 | End: 2021-11-26 | Stop reason: SDUPTHER

## 2021-10-20 RX ORDER — PREGABALIN 100 MG/1
100 CAPSULE ORAL 3 TIMES DAILY
Qty: 90 CAPSULE | Refills: 1 | Status: SHIPPED | OUTPATIENT
Start: 2021-10-20 | End: 2021-11-26

## 2021-11-12 DIAGNOSIS — E11.49 OTHER DIABETIC NEUROLOGICAL COMPLICATION ASSOCIATED WITH TYPE 2 DIABETES MELLITUS (HCC): ICD-10-CM

## 2021-11-12 RX ORDER — PREGABALIN 75 MG/1
CAPSULE ORAL
Qty: 90 CAPSULE | Refills: 1 | Status: SHIPPED | OUTPATIENT
Start: 2021-11-12 | End: 2021-11-26

## 2021-11-22 ENCOUNTER — TELEPHONE (OUTPATIENT)
Dept: PAIN MEDICINE | Facility: CLINIC | Age: 61
End: 2021-11-22

## 2021-11-26 ENCOUNTER — OFFICE VISIT (OUTPATIENT)
Dept: PAIN MEDICINE | Facility: CLINIC | Age: 61
End: 2021-11-26
Payer: COMMERCIAL

## 2021-11-26 VITALS
HEIGHT: 64 IN | DIASTOLIC BLOOD PRESSURE: 85 MMHG | SYSTOLIC BLOOD PRESSURE: 162 MMHG | HEART RATE: 97 BPM | BODY MASS INDEX: 53.78 KG/M2 | WEIGHT: 315 LBS

## 2021-11-26 DIAGNOSIS — M25.552 CHRONIC LEFT HIP PAIN: ICD-10-CM

## 2021-11-26 DIAGNOSIS — G89.29 CHRONIC LEFT HIP PAIN: ICD-10-CM

## 2021-11-26 DIAGNOSIS — E66.01 MORBID OBESITY (HCC): ICD-10-CM

## 2021-11-26 DIAGNOSIS — G89.4 CHRONIC PAIN SYNDROME: Primary | ICD-10-CM

## 2021-11-26 DIAGNOSIS — Z79.891 ENCOUNTER FOR LONG-TERM OPIATE ANALGESIC USE: ICD-10-CM

## 2021-11-26 DIAGNOSIS — Z79.891 LONG-TERM CURRENT USE OF OPIATE ANALGESIC: ICD-10-CM

## 2021-11-26 DIAGNOSIS — M16.12 PRIMARY OSTEOARTHRITIS OF LEFT HIP: ICD-10-CM

## 2021-11-26 DIAGNOSIS — E11.40 DIABETIC NEUROPATHY, PAINFUL (HCC): ICD-10-CM

## 2021-11-26 DIAGNOSIS — F11.20 UNCOMPLICATED OPIOID DEPENDENCE (HCC): ICD-10-CM

## 2021-11-26 PROCEDURE — 99214 OFFICE O/P EST MOD 30 MIN: CPT | Performed by: NURSE PRACTITIONER

## 2021-11-26 PROCEDURE — 80305 DRUG TEST PRSMV DIR OPT OBS: CPT | Performed by: NURSE PRACTITIONER

## 2021-11-26 RX ORDER — HYDROCODONE BITARTRATE AND ACETAMINOPHEN 7.5; 325 MG/1; MG/1
1 TABLET ORAL
Qty: 30 TABLET | Refills: 0 | Status: SHIPPED | OUTPATIENT
Start: 2021-11-26 | End: 2022-01-26 | Stop reason: SDUPTHER

## 2021-11-26 RX ORDER — HYDROCODONE BITARTRATE AND ACETAMINOPHEN 7.5; 325 MG/1; MG/1
TABLET ORAL
Qty: 30 TABLET | Refills: 0 | Status: SHIPPED | OUTPATIENT
Start: 2021-11-26 | End: 2022-01-26 | Stop reason: SDUPTHER

## 2021-11-26 RX ORDER — GABAPENTIN 100 MG/1
CAPSULE ORAL
Qty: 90 CAPSULE | Refills: 1 | Status: SHIPPED | OUTPATIENT
Start: 2021-11-26 | End: 2022-01-20

## 2021-11-30 LAB
6MAM UR QL CFM: NEGATIVE NG/ML
7AMINOCLONAZEPAM UR QL CFM: NEGATIVE NG/ML
A-OH ALPRAZ UR QL CFM: NEGATIVE NG/ML
ACCEPTABLE CREAT UR QL: NORMAL MG/DL
ACCEPTIBLE SP GR UR QL: NORMAL
AMITRIP UR QL CFM: NEGATIVE NG/ML
AMPHET UR QL CFM: NEGATIVE NG/ML
AMPHET UR QL CFM: NEGATIVE NG/ML
BENZODIAZ UR QL SCN: NORMAL
BUPRENORPHINE UR QL CFM: NEGATIVE NG/ML
BUTALBITAL UR QL CFM: NEGATIVE NG/ML
BZE UR QL CFM: NEGATIVE NG/ML
CARISOPRODOL UR QL CFM: NEGATIVE NG/ML
CODEINE UR QL CFM: NEGATIVE NG/ML
DESIPRAMINE UR QL CFM: NEGATIVE NG/ML
EDDP UR QL CFM: NEGATIVE NG/ML
FENTANYL UR QL CFM: NEGATIVE NG/ML
GLIADIN IGG SER IA-ACNC: NEGATIVE NG/ML
GLUCOSE 30M P 50 G LAC PO SERPL-MCNC: NEGATIVE NG/ML
HYDROCODONE UR QL CFM: NORMAL NG/ML
HYDROCODONE UR QL CFM: NORMAL NG/ML
HYDROMORPHONE UR QL CFM: NORMAL NG/ML
LORAZEPAM UR QL CFM: NEGATIVE NG/ML
MDMA UR QL CFM: NEGATIVE NG/ML
MEPROBAMATE UR QL CFM: NEGATIVE NG/ML
METHADONE UR QL CFM: NEGATIVE NG/ML
METHAMPHET UR QL CFM: NEGATIVE NG/ML
MORPHINE UR QL CFM: NEGATIVE NG/ML
MORPHINE UR QL CFM: NEGATIVE NG/ML
NALTREXONE UR QL CFM: NEGATIVE NG/ML
NITRITE UR QL: NORMAL UG/ML
NORBUPRENORPHINE UR QL CFM: NEGATIVE NG/ML
NORDIAZEPAM UR QL CFM: NEGATIVE NG/ML
NORFENTANYL UR QL CFM: NEGATIVE NG/ML
NORHYDROCODONE UR QL CFM: NORMAL NG/ML
NORHYDROCODONE UR QL CFM: NORMAL NG/ML
NOROXYCODONE UR QL CFM: NEGATIVE NG/ML
NORTRIP UR QL CFM: NEGATIVE NG/ML
OPC-3373 QUANTIFICATION: NEGATIVE
OXAZEPAM UR QL CFM: NEGATIVE NG/ML
OXYCODONE UR QL CFM: NEGATIVE NG/ML
OXYMORPHONE UR QL CFM: NEGATIVE NG/ML
OXYMORPHONE UR QL CFM: NEGATIVE NG/ML
PCP UR QL CFM: NEGATIVE NG/ML
PHENOBARB UR QL CFM: NEGATIVE NG/ML
RESULT ALL_PRESCRIBED MEDS AND SPECIAL INSTRUCTIONS: NORMAL
SECOBARBITAL UR QL CFM: NEGATIVE NG/ML
SL AMB 3-METHYL-FENTANYL QUANTIFICATION: NORMAL NG/ML
SL AMB 4-ANPP QUANTIFICATION: NORMAL NG/ML
SL AMB 4-FIBF QUANTIFICATION: NORMAL NG/ML
SL AMB 7-OH-MITRAGYNINE (KRATOM ALKALOID) QUANTIFICATION: NEGATIVE NG/ML
SL AMB ACETYL FENTANYL QUANTIFICATION: NORMAL NG/ML
SL AMB ACETYL NORFENTANYL QUANTIFICATION: NORMAL NG/ML
SL AMB ACRYL FENTANYL QUANTIFICATION: NORMAL NG/ML
SL AMB BUTRYL FENTANYL QUANTIFICATION: NORMAL NG/ML
SL AMB CARFENTANIL QUANTIFICATION: NORMAL NG/ML
SL AMB CLOZAPINE QUANTIFICATION: NEGATIVE NG/ML
SL AMB CTHC (MARIJUANA METABOLITE) QUANTIFICATION: NEGATIVE NG/ML
SL AMB CYCLOPROPYL FENTANYL QUANTIFICATION: NORMAL NG/ML
SL AMB FURANYL FENTANYL QUANTIFICATION: NORMAL NG/ML
SL AMB HALOPERIDOL  QUANTIFICATION: NEGATIVE NG/ML
SL AMB HALOPERIDOL METABOLITE QUANTIFICATION: NEGATIVE NG/ML
SL AMB HYDROXYBUPROPION QUANTIFICATION: NEGATIVE NG/ML
SL AMB HYDROXYRISPERIDONE QUANTIFICATION: NEGATIVE NG/ML
SL AMB METHOXYACETYL FENTANYL QUANTIFICATION: NORMAL NG/ML
SL AMB N-DESMETHYL U-47700 QUANTIFICATION: NORMAL NG/ML
SL AMB N-DESMETHYL-TRAMADOL QUANTIFICATION: ABNORMAL NG/ML
SL AMB N-DESMETHYLCLOZAPINE QUANTIFICATION: NEGATIVE NG/ML
SL AMB PHENTERMINE QUANTIFICATION: NEGATIVE NG/ML
SL AMB PREGABALIN QUANTIFICATION: NORMAL
SL AMB RISPERIDONE QUANTIFICATION: NEGATIVE NG/ML
SL AMB U-47700 QUANTIFICATION: NORMAL NG/ML
SMOOTH MUSCLE AB TITR SER IF: NEGATIVE NG/ML
SPECIMEN PH ACCEPTABLE UR: NORMAL
TAPENTADOL UR QL CFM: NEGATIVE NG/ML
TEMAZEPAM UR QL CFM: NEGATIVE NG/ML
TEMAZEPAM UR QL CFM: NEGATIVE NG/ML
TRAMADOL UR QL CFM: ABNORMAL NG/ML
URATE/CREAT 24H UR: ABNORMAL NG/ML

## 2022-01-13 ENCOUNTER — CONSULT (OUTPATIENT)
Dept: BARIATRICS | Facility: CLINIC | Age: 62
End: 2022-01-13
Payer: COMMERCIAL

## 2022-01-13 VITALS
BODY MASS INDEX: 53.78 KG/M2 | HEART RATE: 104 BPM | HEIGHT: 64 IN | DIASTOLIC BLOOD PRESSURE: 86 MMHG | TEMPERATURE: 98.4 F | RESPIRATION RATE: 20 BRPM | OXYGEN SATURATION: 98 % | WEIGHT: 315 LBS | SYSTOLIC BLOOD PRESSURE: 150 MMHG

## 2022-01-13 DIAGNOSIS — I10 ESSENTIAL HYPERTENSION: ICD-10-CM

## 2022-01-13 DIAGNOSIS — G89.29 CHRONIC LEFT HIP PAIN: ICD-10-CM

## 2022-01-13 DIAGNOSIS — G89.4 CHRONIC PAIN SYNDROME: ICD-10-CM

## 2022-01-13 DIAGNOSIS — I25.810 CORONARY ARTERY DISEASE INVOLVING AUTOLOGOUS VEIN CORONARY BYPASS GRAFT WITHOUT ANGINA PECTORIS: ICD-10-CM

## 2022-01-13 DIAGNOSIS — E11.69 DIABETES MELLITUS TYPE 2 IN OBESE (HCC): ICD-10-CM

## 2022-01-13 DIAGNOSIS — E66.9 DIABETES MELLITUS TYPE 2 IN OBESE (HCC): ICD-10-CM

## 2022-01-13 DIAGNOSIS — E11.9 TYPE 2 DIABETES MELLITUS WITHOUT COMPLICATION, WITHOUT LONG-TERM CURRENT USE OF INSULIN (HCC): ICD-10-CM

## 2022-01-13 DIAGNOSIS — I10 BENIGN ESSENTIAL HTN: ICD-10-CM

## 2022-01-13 DIAGNOSIS — E66.01 MORBID OBESITY (HCC): Primary | ICD-10-CM

## 2022-01-13 DIAGNOSIS — M16.12 PRIMARY OSTEOARTHRITIS OF LEFT HIP: ICD-10-CM

## 2022-01-13 DIAGNOSIS — M25.552 CHRONIC LEFT HIP PAIN: ICD-10-CM

## 2022-01-13 PROCEDURE — 99204 OFFICE O/P NEW MOD 45 MIN: CPT | Performed by: PHYSICIAN ASSISTANT

## 2022-01-13 NOTE — PROGRESS NOTES
Assessment/Plan: Morbid obesity ([address] )  -Discussed options of [facility]-Intensive Lifestyle Intervention Program, Very Low Calorie Diet-VLCD, Conservative Program, Ernestina-En-Y Gastric Bypass and Vertical Sleeve Gastrectomy and the role of weight loss medications  -patient not currently interested in bariatric surgery  -Initial weight loss goal of 5-10% weight loss for improved health  -Screening labs: update labs  -Patient is interested in pursuing [facility]  -states he is always thinking about food  Suggested meeting with LCSW with our program once starts [facility] program  Can consider follow up with [address] [place] [address] as well as addition of AOM    +STOP BANG (5/8):  -reviewed risks of undiagnosed/untreated sleep apnea  -Recommended referral to sleep medicine provider for further evaluation   -Patient declined and would like to see if risks improve with weight loss  Repeat STOP BANG 3-6 months    Benign essential HTN  -On Lisinopril and Metoprolol  -may improve with weight loss  -continue management with PCP    Type 2 diabetes mellitus without complication, without long-term current use of insulin ([facility])    Lab Results   Component Value Date    HGBA1C 6 4 12/03/2020   -Reports currently only taking Metformin  -self discontinued trulicity  -update labs  -avoidance of refined carbohydrates    CAD (coronary artery disease), autologous vein bypass graft  -On ASA, Lipitor, Metoprolol  -continued follow up with cardiology  -avoid trans fat, limit saturated fats and refined carbohydrates    Chronic pain syndrome  -On Norco  Recently prescribed Gabapentin but patient reportedly is not taking it  -Followed by pain management  -reports he is unable to have L hip sugery due to his weight  -weight loss may help improve his symptoms    Goals:    Food log (ie ) www myfitnesspal com,sparkpeople  com,loseit com,calorieking  com,etc    No sugary beverages  At least 64oz of water daily  Increase physical activity by 10 minutes daily  Gradually increase physical activity to a goal of 5 days per week for 30 minutes of MODERATE intensity PLUS 2 days per week of FULL BODY resistance training    Follow up in approximately 2 weeks with Non-Surgical Dietician and 6 weeks with PA-C    Diagnoses and all orders for this visit:    Morbid obesity ([facility] )  -     Ambulatory referral to Weight Management  -     Comprehensive metabolic panel; Future  -     Lipid panel; Future  -     HEMOGLOBIN A1C W/ EAG ESTIMATION; Future  -     TSH, 3rd generation with Free T4 reflex; Future    Chronic pain syndrome  -     Ambulatory referral to Weight Management    Primary osteoarthritis of left hip  -     Ambulatory referral to Weight Management    Chronic left hip pain  -     Ambulatory referral to Weight Management    Diabetes mellitus type 2 in obese (H[facility])  -     Comprehensive metabolic panel; Future  -     Lipid panel; Future  -     HEMOGLOBIN A1C W/ EAG ESTIMATION; Future  -     TSH, 3rd generation with Free T4 reflex; Future    Essential hypertension  -     Comprehensive metabolic panel; Future  -     Lipid panel; Future  -     HEMOGLOBIN A1C W/ EAG ESTIMATION; Future  -     TSH, 3rd generation with Free T4 reflex; Future    Benign essential HTN    Type 2 diabetes mellitus without complication, without long-term current use of insulin ([facility])    Coronary artery disease involving autologous vein coronary bypass graft without angina pectoris    Other orders  -     metFORMIN (GLUCOPHAGE) 1000 MG tablet; Take 1,000 mg by mouth 2 (two) times a day with meals          Subjective:   Chief Complaint   Patient presents with    Consult       Patient ID: Dinorah Bee  is a 62 y o  male with excess weight/obesity here to pursue weight managment      Past Medical History:   Diagnosis Date    CAD (coronary artery disease)     s/p CABG x 2 LIMA-LAD, VG- OM1 8/5/2013    Carotid duplex 08/05/2013    20-49% bilateral stenosis    Diabetes ([facility] )     History of echocardiogram 12/22/2016 EF 55% Mild LVH  Mild MR    Hyperlipidemia     Hypertension          HPI:  Obesity/Excess Weight:  Severity: Very Severe  Onset:  Early 20's    Modifiers: portion control  Contributing factors: Insufficient Physical Activity and loves to eat, poor food choices  Associated symptoms: decreased mobility    Goals: 200 lbs   Highest: current  Hydration: water 5-6 bottles; diet iced tea, coffee w/ cream and sweet and low  Exercise: denies; very limited with hip pain  ETOH: denies  SMoking: denies  Sleep: 8 hours    Colonoscopy: never completed; declines referral  Advised follow up with PCP to discuss colguard    The following portions of the patient's history were reviewed and updated as appropriate: allergies, current medications, past family history, past medical history, past social history, past surgical history and problem list     Review of Systems   Constitutional: Negative for chills and fever  HENT: Negative for sore throat  Respiratory: Negative for cough and shortness of breath  Cardiovascular: Negative for chest pain and palpitations  Gastrointestinal: Negative for abdominal pain, nausea and vomiting  Genitourinary: Negative for dysuria  Musculoskeletal: Positive for arthralgias  Skin: Positive for rash (under abdominal folds; advise follow up with PCP)  Neurological: Negative for dizziness and headaches  Psychiatric/Behavioral: Negative for dysphoric mood  Objective:    /86   Pulse 104   Temp 98 4 °F (36 9 °C)   Resp 20   Ht 5' 4" (1 626 m)   Wt (!) 150 kg (331 lb 6 4 oz)   SpO2 98%   BMI 56 88 kg/m²     Physical Exam  Vitals and nursing note reviewed  Constitutional:       General: He is not in acute distress  Appearance: He is obese  He is not toxic-appearing  HENT:      Head: Normocephalic and atraumatic  Eyes:      General: No scleral icterus  Pulmonary:      Effort: Pulmonary effort is normal  No respiratory distress     Abdominal:      Comments: Obese, protuberant   Musculoskeletal:      Comments: Ambulates with 2 canes   Skin:     General: Skin is dry  Neurological:      Mental Status: He is alert and oriented to person, place, and time  Psychiatric:         Mood and Affect: Mood normal          Thought Content:  Thought content normal          Judgment: Judgment normal

## 2022-01-13 NOTE — ASSESSMENT & PLAN NOTE
-Discussed options of [facility]-Intensive Lifestyle Intervention Program, Very Low Calorie Diet-VLCD, Conservative Program, Ernestina-En-Y Gastric Bypass and Vertical Sleeve Gastrectomy and the role of weight loss medications  -patient not currently interested in bariatric surgery  -Initial weight loss goal of 5-10% weight loss for improved health  -Screening labs: update labs  -Patient is interested in pursuing [facility]  -states he is always thinking about food  Suggested meeting with LCSW with our program once starts [facility] program  Can consider follow up with [facility] as well as addition of AOM    +STOP BANG (5/8):  -reviewed risks of undiagnosed/untreated sleep apnea  -Recommended referral to sleep medicine provider for further evaluation   -Patient declined and would like to see if risks improve with weight loss   Repeat STOP BANG 3-6 months

## 2022-01-14 NOTE — ASSESSMENT & PLAN NOTE
-On Norco  Recently prescribed Gabapentin but patient reportedly is not taking it  -Followed by pain management  -reports he is unable to have L hip sugery due to his weight  -weight loss may help improve his symptoms

## 2022-01-14 NOTE — ASSESSMENT & PLAN NOTE
-On ASA, Lipitor, Metoprolol  -continued follow up with cardiology  -avoid trans fat, limit saturated fats and refined carbohydrates

## 2022-01-14 NOTE — ASSESSMENT & PLAN NOTE
Lab Results   Component Value Date    HGBA1C 6 4 12/03/2020   -Reports currently only taking Metformin  -self discontinued trulicity  -update labs  -avoidance of refined carbohydrates

## 2022-01-18 ENCOUNTER — APPOINTMENT (OUTPATIENT)
Dept: LAB | Facility: CLINIC | Age: 62
End: 2022-01-18
Payer: COMMERCIAL

## 2022-01-18 DIAGNOSIS — E66.9 DIABETES MELLITUS TYPE 2 IN OBESE (HCC): ICD-10-CM

## 2022-01-18 DIAGNOSIS — G89.4 CHRONIC PAIN SYNDROME: ICD-10-CM

## 2022-01-18 DIAGNOSIS — E11.69 DIABETES MELLITUS TYPE 2 IN OBESE (HCC): ICD-10-CM

## 2022-01-18 DIAGNOSIS — I10 ESSENTIAL HYPERTENSION: ICD-10-CM

## 2022-01-18 DIAGNOSIS — E66.01 MORBID OBESITY (HCC): ICD-10-CM

## 2022-01-18 LAB
ALBUMIN SERPL BCP-MCNC: 3.6 G/DL (ref 3.5–5)
ALP SERPL-CCNC: 112 U/L (ref 46–116)
ALT SERPL W P-5'-P-CCNC: 35 U/L (ref 12–78)
ANION GAP SERPL CALCULATED.3IONS-SCNC: 9 MMOL/L (ref 4–13)
AST SERPL W P-5'-P-CCNC: 22 U/L (ref 5–45)
BILIRUB SERPL-MCNC: 0.79 MG/DL (ref 0.2–1)
BUN SERPL-MCNC: 11 MG/DL (ref 5–25)
CALCIUM SERPL-MCNC: 9.4 MG/DL (ref 8.3–10.1)
CHLORIDE SERPL-SCNC: 104 MMOL/L (ref 100–108)
CHOLEST SERPL-MCNC: 155 MG/DL
CO2 SERPL-SCNC: 21 MMOL/L (ref 21–32)
CREAT SERPL-MCNC: 0.77 MG/DL (ref 0.6–1.3)
EST. AVERAGE GLUCOSE BLD GHB EST-MCNC: 272 MG/DL
GFR SERPL CREATININE-BSD FRML MDRD: 98 ML/MIN/1.73SQ M
GLUCOSE P FAST SERPL-MCNC: 342 MG/DL (ref 65–99)
HBA1C MFR BLD: 11.1 %
HDLC SERPL-MCNC: 40 MG/DL
LDLC SERPL CALC-MCNC: 90 MG/DL (ref 0–100)
NONHDLC SERPL-MCNC: 115 MG/DL
POTASSIUM SERPL-SCNC: 4.1 MMOL/L (ref 3.5–5.3)
PROT SERPL-MCNC: 8.2 G/DL (ref 6.4–8.2)
SODIUM SERPL-SCNC: 134 MMOL/L (ref 136–145)
TRIGL SERPL-MCNC: 123 MG/DL
TSH SERPL DL<=0.05 MIU/L-ACNC: 3.14 UIU/ML (ref 0.36–3.74)

## 2022-01-18 PROCEDURE — 80053 COMPREHEN METABOLIC PANEL: CPT

## 2022-01-18 PROCEDURE — 83036 HEMOGLOBIN GLYCOSYLATED A1C: CPT

## 2022-01-18 PROCEDURE — 36415 COLL VENOUS BLD VENIPUNCTURE: CPT

## 2022-01-18 PROCEDURE — 80061 LIPID PANEL: CPT

## 2022-01-18 PROCEDURE — 84443 ASSAY THYROID STIM HORMONE: CPT

## 2022-01-20 ENCOUNTER — OFFICE VISIT (OUTPATIENT)
Dept: BARIATRICS | Facility: CLINIC | Age: 62
End: 2022-01-20

## 2022-01-20 VITALS — WEIGHT: 315 LBS | HEIGHT: 64 IN | BODY MASS INDEX: 53.78 KG/M2

## 2022-01-20 DIAGNOSIS — R63.5 ABNORMAL WEIGHT GAIN: ICD-10-CM

## 2022-01-20 PROCEDURE — RECHECK

## 2022-01-20 PROCEDURE — WMPRO12

## 2022-01-20 RX ORDER — GABAPENTIN 100 MG/1
CAPSULE ORAL
Qty: 90 CAPSULE | Refills: 1 | Status: SHIPPED | OUTPATIENT
Start: 2022-01-20 | End: 2022-01-26 | Stop reason: SDUPTHER

## 2022-01-20 NOTE — PROGRESS NOTES
Weight Management Medical Nutrition Assessment  Elton Shaw was here today as a new start in the [employer]   Today he weighs 329 2 lbs and his first goal is to lose 20 lbs  He is not currently food logging and has no idea how many calories he is eating daily  Suggested that he start food logging - provided him with a food log  He would rather not use an tatiana  Reviewed portion sizes using food models and hand outs  Also provided him with a portion plate  When reviewing carbs and protein and how much he should have daily, he seemed to be confuses and will most likely need additional education  Patient seen by Medical Provider in past 6 months:  yes  Requested to schedule appointment with Medical Provider: No      Anthropometric Measurements  Start Weight (#): 329 2  Current Weight (#): 329 2  TBW % Change from start weight:0%  Ideal Body Weight (#):130  Goal Weight (#): wants to lose the first 20 lbs    Weight Loss History  Previous weight loss attempts: Self Created Diets (Portion Control, Healthy Food Choices, etc )    Food and Nutrition Related History  Wake up: 6 am   Bed Time: 11 pm    Food Recall  Breakfast:3 eggs, sausages,toast, hash browns   Snack:none  Lunch:sandwich  Snack:  Dinner:steak, mashed potatoes, vegetables or spaghetti  Snack: grazes      Beverages: water and diet soda  Volume of beverage intake: 60 oz    Weekends: Same  Cravings: large portions ( pasta, meat, bread)  Trouble area of day: dinner     Frequency of Eating out: 1 times per week  Food restrictions:none  Cooking: self   Food Shopping: self    Physical Activity Intake  Activity:working on car  Frequency:occasionally  Physical limitations/barriers to exercise: hip    Estimated Needs  Energy    Bear [place] Energy Needs:  BMR : 1286  1-2# loss weekly sedentary:  1669 - 2169       Maintenance calories for sedentary activity level: 2669  Protein:71 - 89      (1 2-1 5g/kg IBW)  Fluid: 70   (35mL/kg IBW)    Nutrition Diagnosis  Yes;     Overweight/obesity  related to Excess energy intake as evidenced by  BMI more than normative standard for age and sex (obesity-grade III 36+)       Nutrition Intervention    Nutrition Prescription  Calories:1600   Protein:71- 80  Fluid:70      Nutrition Education:    Healthy Core Manual  Calorie controlled menu  Lean protein food choices  Healthy snack options  Food journaling tips      Nutrition Counseling:  Strategies: meal planning, portion sizes, healthy snack choices, hydration, fiber intake, protein intake, exercise, food journal      Monitoring and Evaluation:  Evaluation criteria:  Energy Intake  Meet protein needs  Maintain adequate hydration  Monitor weekly weight  Meal planning/preparation  Food journal   Decreased portions at mealtimes and snacks  Physical activity     Barriers to learning:none  Readiness to change: Preparation:  (Getting ready to change)  and Action:  (Changing behavior)  Comprehension: fair  Expected Compliance: fair

## 2022-01-25 ENCOUNTER — TELEPHONE (OUTPATIENT)
Dept: PAIN MEDICINE | Facility: CLINIC | Age: 62
End: 2022-01-25

## 2022-01-25 NOTE — TELEPHONE ENCOUNTER
pts needs to chg to virtual appmt for 1/26-pt has possible exposure to COVID- thank you        [phone]

## 2022-01-26 ENCOUNTER — OFFICE VISIT (OUTPATIENT)
Dept: PAIN MEDICINE | Facility: CLINIC | Age: 62
End: 2022-01-26
Payer: COMMERCIAL

## 2022-01-26 VITALS
BODY MASS INDEX: 56.51 KG/M2 | SYSTOLIC BLOOD PRESSURE: 155 MMHG | HEIGHT: 64 IN | HEART RATE: 75 BPM | DIASTOLIC BLOOD PRESSURE: 87 MMHG

## 2022-01-26 DIAGNOSIS — M25.552 CHRONIC LEFT HIP PAIN: ICD-10-CM

## 2022-01-26 DIAGNOSIS — G89.4 CHRONIC PAIN SYNDROME: Primary | ICD-10-CM

## 2022-01-26 DIAGNOSIS — G89.29 CHRONIC LEFT HIP PAIN: ICD-10-CM

## 2022-01-26 DIAGNOSIS — E11.40 DIABETIC NEUROPATHY, PAINFUL (HCC): ICD-10-CM

## 2022-01-26 DIAGNOSIS — E66.01 MORBID OBESITY (HCC): ICD-10-CM

## 2022-01-26 DIAGNOSIS — M16.12 PRIMARY OSTEOARTHRITIS OF LEFT HIP: ICD-10-CM

## 2022-01-26 PROCEDURE — 99214 OFFICE O/P EST MOD 30 MIN: CPT | Performed by: NURSE PRACTITIONER

## 2022-01-26 RX ORDER — GABAPENTIN 300 MG/1
300 CAPSULE ORAL 3 TIMES DAILY
Qty: 90 CAPSULE | Refills: 2 | Status: SHIPPED | OUTPATIENT
Start: 2022-01-26 | End: 2022-04-01 | Stop reason: SDUPTHER

## 2022-01-26 RX ORDER — HYDROCODONE BITARTRATE AND ACETAMINOPHEN 7.5; 325 MG/1; MG/1
1 TABLET ORAL
Qty: 30 TABLET | Refills: 0 | Status: SHIPPED | OUTPATIENT
Start: 2022-01-26 | End: 2022-04-01 | Stop reason: SDUPTHER

## 2022-01-26 RX ORDER — HYDROCODONE BITARTRATE AND ACETAMINOPHEN 7.5; 325 MG/1; MG/1
TABLET ORAL
Qty: 30 TABLET | Refills: 0 | Status: SHIPPED | OUTPATIENT
Start: 2022-01-26 | End: 2022-04-01 | Stop reason: SDUPTHER

## 2022-01-26 NOTE — TELEPHONE ENCOUNTER
Patient can keep in person appointment as long as he is not exhibiting symptoms  He will be wearing a mask and we will all be wearing masks and have been vaccinated

## 2022-01-26 NOTE — PROGRESS NOTES
Assessment:  1  Chronic pain syndrome    2  Chronic left hip pain    3  Diabetic neuropathy, painful ([facility] [address] )    4  Primary osteoarthritis of left hip    5  Morbid obesity ([facility] [address] )        Plan:  While the patient was in the office today, I did have a thorough conversation regarding their chronic pain syndrome, medication management, and treatment plan options  Patient is being seen for follow-up visit  He was last seen here on 11/26/2021 at which time Lyrica was discontinued due to lack of efficacy  He was started on gabapentin titrating to 100 mg 3 times daily  He states that he is tolerating gabapentin without side effects, but has not really noticed any additional improvement in his pain  At this point, we will plan to increase the gabapentin, titrating it to 300 mg 3 times daily  New prescription was sent to his pharmacy  Continue [place] 7 5/325 at bedtime if needed for pain  The patient's opioid scripts were sent to their pharmacy electronically and was given a 2 month supply of prescriptions with a Do Not Fill date(s) of 01/26/2022, 2/23/2022  [place] There are risks associated with opioid medications, including dependence, addiction and tolerance  The patient understands and agrees to use these medications only as prescribed  Potential side effects of the medications include, but are not limited to, constipation, drowsiness, addiction, impaired judgment and risk of fatal overdose if not taken as prescribed  The patient was warned against driving while taking sedation medications  Sharing medications is a felony  At this point in time, the patient is showing no signs of addiction, abuse, diversion or suicidal ideation  [place] Prescription Drug Monitoring Program report was reviewed and was appropriate     Patient is under the care of weight management  He has made some changes to his diet  He has a follow-up scheduled tomorrow      The patient will follow-up in 2 months for medication prescription refill and reevaluation  The patient was advised to contact the office should their symptoms worsen in the interim  The patient was agreeable and verbalized an understanding  History of Present Illness: The patient is a 62 y o  male who presents for a follow up office visit in regards to Leg Pain and Hip Pain  The patients current symptoms include complaints of low back pain, left hip pain  Current pain level is an 8/10  Quality pain is described as throbbing  Current pain medications includes:  Norco 7 5/325 at bedtime if needed for pain, gabapentin 100 mg 3 times daily    The patient reports that this regimen is providing 50 % pain relief  The patient is reporting no side effects from this pain medication regimen  I have personally reviewed and/or updated the patient's past medical history, past surgical history, family history, social history, current medications, allergies, and vital signs today  Review of Systems  Review of Systems   Constitutional: Negative for chills and fever  HENT: Negative for ear pain and sore throat  Eyes: Negative for pain and visual disturbance  Respiratory: Negative for cough and shortness of breath  Cardiovascular: Negative for chest pain and palpitations  Gastrointestinal: Negative for abdominal pain and vomiting  Genitourinary: Negative for dysuria and hematuria  Musculoskeletal: Positive for gait problem  Negative for arthralgias and back pain  Decreased ROM  Joint stiffness   Skin: Negative for color change and rash  Neurological: Positive for weakness  Negative for seizures and syncope  All other systems reviewed and are negative  Past Medical History:   Diagnosis Date    CAD (coronary artery disease)     s/p CABG x 2 LIMA-LAD, VG- OM1 8/5/2013    Carotid duplex 08/05/2013    20-49% bilateral stenosis    Diabetes ([facility] [address] )     History of echocardiogram 12/22/2016    EF 55% Mild LVH   Mild MR    Hyperlipidemia     Hypertension        Past Surgical History:   Procedure Laterality Date    CARDIAC CATHETERIZATION  08/02/2013    EF 50% Severe left main disease 90%, OM1 99%, 100% RCA (bypass scheduled)    CORONARY ARTERY BYPASS GRAFT  08/05/2013    CABG X2 LIMA-LAD, VG-OM1    WOUND DEBRIDEMENT Right 8/12/2020    Procedure: EXCISIONAL DEBRIDEMENT Right pannus/groin;  Surgeon: Khurram Paz MD;  [facility];  Service: Trauma    WOUND DEBRIDEMENT Right 8/13/2020    Procedure: EXCISIONAL DEBRIDEMENT, placement of drain and closure of wound;  Surgeon: Jesica Naranjo DO;  Location: [facility];  Service: General       Family History   Problem Relation Age of Onset    Diabetes Mother     Heart disease Mother     Stroke Mother     Obesity Mother     Diabetes Brother     Heart disease Brother     Obesity Brother     Cancer Neg Hx        Social History     Occupational History    Not on file   Tobacco Use    Smoking status: Former Smoker    Smokeless tobacco: Former User   Vaping Use    Vaping Use: Never used   Substance and Sexual Activity    Alcohol use: Not Currently    Drug use: Not Currently    Sexual activity: Not on file         Current Outpatient Medications:     aspirin 81 MG tablet, Take 1 tablet (81 mg total) by mouth daily, Disp: , Rfl:     atorvastatin (LIPITOR) 80 mg tablet, Take 80 mg by mouth daily, Disp: , Rfl:     gabapentin (NEURONTIN) 300 mg capsule, Take 1 capsule (300 mg total) by mouth 3 (three) times a day, Disp: 90 capsule, Rfl: 2    HYDROcodone-acetaminophen (NORCO) 7 5-325 mg per tablet, Take 1 tablet by mouth daily at bedtime as needed for pain Do not fill until 1/26/2022 Max Daily Amount: 1 tablet, Disp: 30 tablet, Rfl: 0    HYDROcodone-acetaminophen (NORCO) 7 5-325 mg per tablet, At bedtimes if needed    Do not fill until 2/23/2022, Disp: 30 tablet, Rfl: 0    ibuprofen (MOTRIN) 800 mg tablet, Take 1 tablet (800 mg total) by mouth every 8 (eight) hours as needed for mild pain, moderate pain, fever or headaches, Disp: 40 tablet, Rfl: 0    lisinopril (ZESTRIL) 5 mg tablet, Take 1 tablet by mouth daily , Disp: , Rfl:     metFORMIN (GLUCOPHAGE) 1000 MG tablet, Take 1,000 mg by mouth 2 (two) times a day with meals, Disp: , Rfl:     metFORMIN (GLUCOPHAGE) 500 mg tablet, take 1 tablet by mouth twice a day with meals, Disp: 180 tablet, Rfl: 2    metoprolol tartrate (LOPRESSOR) 50 mg tablet, Take 50 mg by mouth every 12 (twelve) hours, Disp: , Rfl:     nitroglycerin (NITROSTAT) 0 4 mg SL tablet, Place 1 tablet under the tongue as needed, Disp: , Rfl:     cyclobenzaprine (FLEXERIL) 10 mg tablet, Take 1 tablet (10 mg total) by mouth 3 (three) times a day as needed for muscle spasms (Patient not taking: Reported on 8/2/2021), Disp: 30 tablet, Rfl: 0    Dulaglutide (Trulicity) 7 81 CC/0 9GR SOPN, Inject 0 5 mL (0 75 mg total) under the skin once a week (Patient not taking: Reported on 8/2/2021), Disp: 12 pen, Rfl: 3    Empagliflozin (Jardiance) 10 MG TABS, Take 1 tablet (10 mg total) by mouth every morning (Patient not taking: Reported on 8/30/2021), Disp: 30 tablet, Rfl: 3    glucagon (Glucagon Emergency) 1 MG injection, Inject 1 mg under the skin once as needed for low blood sugar for up to 1 dose (Patient not taking: Reported on 8/2/2021), Disp: 1 kit, Rfl: 1    Insulin Pen Needle (Pen Needles) 32G X 5 MM MISC, Use 1 needle for every time insulin is injected (Patient not taking: Reported on 8/2/2021), Disp: 400 each, Rfl: 3    No Known Allergies    Physical Exam:    /87 (BP Location: Right arm, Patient Position: Sitting, Cuff Size: Large)   Pulse 75   Ht 5' 4" (1 626 m)   BMI 56 51 kg/m²     Constitutional:normal, well developed, well nourished, alert, in no distress and non-toxic and no overt pain behavior   and overweight  Eyes:anicteric  HEENT:grossly intact  Neck:supple, symmetric, trachea midline and no masses   Pulmonary:even and unlabored  Cardiovascular:No edema or pitting edema present  Skin:Normal without rashes or lesions and well hydrated  Psychiatric:Mood and affect appropriate  Neurologic:Cranial Nerves II-XII grossly intact  Musculoskeletal:in wheelchair    Imaging  No orders to display       No orders of the defined types were placed in this encounter

## 2022-01-26 NOTE — PATIENT INSTRUCTIONS
Opioid Safety   WHAT YOU NEED TO KNOW:   An opioid medicine is used to treat pain  Examples are oxycodone, morphine, fentanyl, or codeine  Pain control and management may help you rest, heal, and return to your daily activities  You and your family will receive information about how to manage your pain at home  The instructions will include what to do if you have side effects as your pain is managed  You will get information on how to handle opioid medicine safely  You will also get suggestions on how to control pain without opioids  It is important to follow all instructions so your pain is managed effectively  DISCHARGE INSTRUCTIONS:   Call your local emergency number (911 in the [place]), or have someone else call if:   · You have a seizure  · You cannot be woken  · You have trouble staying awake and your breathing is slow or shallow  · Your speech is slurred, or you are confused  · You are dizzy or stumble when you walk  Call your doctor, or have someone close to you call if:   · You are extremely drowsy, or you have trouble staying awake or speaking  · You have pale or clammy skin  · You have blue fingernails or lips  · Your heartbeat is slower than normal     · You cannot stop vomiting  · You have questions or concerns about your condition or care  Use opioids safely:   · Take prescribed opioids exactly as directed  Opioids come with directions based on the kind and how it is given  Talk to your healthcare provider or a pharmacist if you have any questions  Do not take more than the recommended amount  Too much can cause a life-threatening overdose  Do not continue to take it after your pain stops  You may develop tolerance  This means you keep needing higher doses to get the same effect  You may also develop opioid use disorder  This means you are not able to control your opioid use  · Do not give opioids to others or take opioids that belong to someone else    The kind or amount one person takes may not be right for another  The person you share them with may also be taking medicines that do not mix with opioids  He or she may drink alcohol or use other drugs that can cause life-threatening problems when mixed with opioids  · Do not mix opioids with other medicines or alcohol  The combination can cause an overdose, or cause you to stop breathing  Alcohol, sleeping pills, and medicines such as antihistamines can make you sleepy  A combination with opioids can lead to a coma  · Do not drive or operate heavy machinery after you use an opioid  You may feel drowsy or have trouble concentrating  You can injure yourself or others if you drive or use heavy machinery when you are not alert  Your provider or pharmacist can tell you how long to wait after a dose before you do these activities  · Talk to your healthcare provider if you have any side effects  Side effects include nausea, sleepiness, itching, and trouble thinking clearly  Your provider may need to make changes to the kind or amount of opioid you are taking  He or she can also help you find ways to prevent or relieve side effects  Manage constipation:  Constipation is the most common side effect of opioid medicine  Constipation is when you have hard, dry bowel movements, or you go longer than usual between bowel movements  Tell your healthcare provider about all changes in your bowel movements while you are taking opioids  He or she may recommend laxative medicine to help you have a bowel movement  He or she may also change the kind of opioid you are taking, or change when you take it  The following are more ways you can prevent or relieve constipation:  · Drink liquids as directed  You may need to drink extra liquids to help soften and move your bowels  Ask how much liquid to drink each day and which liquids are best for you  · Eat high-fiber foods    This may help decrease constipation by adding bulk to your bowel movements  High-fiber foods include fruits, vegetables, whole-grain breads and cereals, and beans  Your healthcare provider or dietitian can help you create a high-fiber meal plan  Your provider may also recommend a fiber supplement if you cannot get enough fiber from food  · Exercise regularly  Regular physical activity can help stimulate your intestines  Walking is a good exercise to prevent or relieve constipation  Ask which exercises are best for you  · Schedule a time each day to have a bowel movement  This may help train your body to have regular bowel movements  Bend forward while you are on the toilet to help move the bowel movement out  Sit on the toilet for at least 10 minutes, even if you do not have a bowel movement  Store opioids safely:   · Store opioids where others cannot easily get them  Keep them in a locked cabinet or secure area  Do not  keep them in a purse or other bag you carry with you  A person may be looking for something else and find the opioids  · Make sure opioids are stored out of the reach of children  A child can easily overdose on opioids  Opioids may look like candy to a small child  The best way to dispose of opioids: The laws vary by country and area  In the [place], the best way is to return the opioids through a take-back program  This program is offered by the [employer])  The following are options for using the program:  · Take the opioids to a JORJE collection site  The site is often a law enforcement center  Call your local law enforcement center for scheduled take-back days in your area  You will be given information on where to go if the collection site is in a different location  · Take the opioids to an approved pharmacy or hospital   A pharmacy or hospital may be set up as a collection site  You will need to ask if it is a JORJE collection site if you were not directed there   A pharmacy or doctor's office may not be able to take back opioids unless it is a JORJE site  · Use a mail-back system  This means you are given containers to put the opioids into  You will then mail them in the containers  · Use a take-back drop box  This is a place to leave the opioids at any time  People and animals will not be able to get into the box  Your local law enforcement agency can tell you where to find a drop box in your area  Other safe ways to dispose of opioids: The medicine may come with disposal instructions  The instructions may vary depending on the brand of medicine you are using  Instructions may come in a Medication Guide, but not every medicine has one  You may instead get instructions from your pharmacy or doctor  Follow instructions carefully  The following are general guidelines to follow:  · Find out if you can flush the opioid  Some opioids can be flushed down the toilet or poured into the sink  You will need to contact authorities in your area to see if this is an option for you  The FDA also offers a list of medicines that are safe to flush down the toilet  You can check the list if you cannot get the information for your local area  · Ask your waste management company about rules for putting opioids in the trash  The company will be able to give you specific directions  Scratch out personal information on the original medicine label so it cannot be read  Then put it in the trash  Do not label the trash or put any information on it about the opioids  It should look like regular household trash so no one is tempted to look for the opioids  Keep the trash out of the reach of children and animals  Always make sure trash is secure  · Talk to officials if you live in a facility  If you live in a nursing home or assisted living center, talk to an official  The person will know the rules for your area  Other ways to manage pain:   · Ask your healthcare provider about non-opioid medicines to control pain  Some medicines may even work better than opioids, depending on the cause of your pain  Nonprescription medicines include NSAIDs (such as ibuprofen) and acetaminophen  Prescription medicines include muscle relaxers, antidepressants, and steroids  · Pain may be managed without any medicines  Some ways to relieve pain include massage, aromatherapy, or meditation  Physical or occupational therapy may also help  For more information:   · Drug Enforcement Administration  [address] [facility] [address]  Phone: 5- 807 - [phone]  Web Address: [facility]/drug_disposal/    · [address] and Drug Administration  [place] , [address] [facility]  Phone: 7- 006 - [phone]  Web Address: http://[employer]/    Follow up with your doctor or pain specialist as directed: You may need to have your dose adjusted  Your doctor or pain specialist can also help you find ways to manage pain without opioids  Write down your questions so you remember to ask them during your visits  © Copyright [employer] 2021 Information is for End User's use only and may not be sold, redistributed or otherwise used for commercial purposes  All illustrations and images included in CareNotes® are the copyrighted property of [employer]  or Giancarlo Cardona  The above information is an  only  It is not intended as medical advice for individual conditions or treatments  Talk to your doctor, nurse or pharmacist before following any medical regimen to see if it is safe and effective for you

## 2022-01-27 ENCOUNTER — CLINICAL SUPPORT (OUTPATIENT)
Dept: BARIATRICS | Facility: CLINIC | Age: 62
End: 2022-01-27

## 2022-01-27 VITALS — BODY MASS INDEX: 53.78 KG/M2 | HEIGHT: 64 IN | WEIGHT: 315 LBS

## 2022-01-27 DIAGNOSIS — R63.5 ABNORMAL WEIGHT GAIN: Primary | ICD-10-CM

## 2022-01-27 PROCEDURE — RECHECK

## 2022-02-03 ENCOUNTER — CLINICAL SUPPORT (OUTPATIENT)
Dept: BARIATRICS | Facility: CLINIC | Age: 62
End: 2022-02-03

## 2022-02-03 VITALS — HEIGHT: 64 IN | WEIGHT: 315 LBS | BODY MASS INDEX: 53.78 KG/M2

## 2022-02-03 DIAGNOSIS — R63.5 ABNORMAL WEIGHT GAIN: Primary | ICD-10-CM

## 2022-02-03 PROCEDURE — RECHECK

## 2022-03-10 NOTE — TELEPHONE ENCOUNTER
S/W Carita Kawasaki at the pharmacy  Advised her of the same  She stated they have a script on hold to be filled and will get it ready  S/W pt  Advised pt of the same  Pt appreciative

## 2022-03-10 NOTE — TELEPHONE ENCOUNTER
S/W pt  Attempted to schedule SOVS   He stated BK told him she will send in 2 months  His next SOVS is 4/1  Please advise

## 2022-03-10 NOTE — TELEPHONE ENCOUNTER
Pt contacted Call Center requested refill of their medication  Medication Name: HYDROcodone-acetaminophen (NORCO    Dosage of Med:7 5-325 mg per tablet     Frequency of Med: 1x a day       Remaining Medication: a copule      Pharmacy and Location:  [facility], [address] [place] [address]   DR Terrie Kang

## 2022-03-10 NOTE — TELEPHONE ENCOUNTER
Yes   I will provide him with prescriptions for 2 months worth of medication  However, he needs a follow-up visit to renew medications  I would never provided him with 2 months with of opiates without seeing him for follow-up visit

## 2022-03-10 NOTE — TELEPHONE ENCOUNTER
I gave him 2 prescriptions with the following dates:  01/26/2022 and 02/23/2022  Per the PDMP, he filled a prescription on 01/26/2022  There should still be a 2nd prescription  Does the pharmacy not have it?

## 2022-04-01 ENCOUNTER — OFFICE VISIT (OUTPATIENT)
Dept: PAIN MEDICINE | Facility: CLINIC | Age: 62
End: 2022-04-01
Payer: COMMERCIAL

## 2022-04-01 VITALS
HEIGHT: 64 IN | TEMPERATURE: 97.7 F | DIASTOLIC BLOOD PRESSURE: 82 MMHG | WEIGHT: 315 LBS | HEART RATE: 98 BPM | BODY MASS INDEX: 53.78 KG/M2 | OXYGEN SATURATION: 98 % | SYSTOLIC BLOOD PRESSURE: 154 MMHG

## 2022-04-01 DIAGNOSIS — G89.4 CHRONIC PAIN SYNDROME: Primary | ICD-10-CM

## 2022-04-01 DIAGNOSIS — F11.20 UNCOMPLICATED OPIOID DEPENDENCE (HCC): ICD-10-CM

## 2022-04-01 DIAGNOSIS — M25.552 CHRONIC LEFT HIP PAIN: ICD-10-CM

## 2022-04-01 DIAGNOSIS — M16.12 PRIMARY OSTEOARTHRITIS OF LEFT HIP: ICD-10-CM

## 2022-04-01 DIAGNOSIS — Z79.891 LONG-TERM CURRENT USE OF OPIATE ANALGESIC: ICD-10-CM

## 2022-04-01 DIAGNOSIS — Z79.891 ENCOUNTER FOR LONG-TERM OPIATE ANALGESIC USE: ICD-10-CM

## 2022-04-01 DIAGNOSIS — E66.01 MORBID OBESITY (HCC): ICD-10-CM

## 2022-04-01 DIAGNOSIS — E11.40 DIABETIC NEUROPATHY, PAINFUL (HCC): ICD-10-CM

## 2022-04-01 DIAGNOSIS — G89.29 CHRONIC LEFT HIP PAIN: ICD-10-CM

## 2022-04-01 PROCEDURE — 80305 DRUG TEST PRSMV DIR OPT OBS: CPT | Performed by: NURSE PRACTITIONER

## 2022-04-01 PROCEDURE — 99214 OFFICE O/P EST MOD 30 MIN: CPT | Performed by: NURSE PRACTITIONER

## 2022-04-01 RX ORDER — GABAPENTIN 300 MG/1
300 CAPSULE ORAL 3 TIMES DAILY
Qty: 90 CAPSULE | Refills: 2 | Status: SHIPPED | OUTPATIENT
Start: 2022-04-01 | End: 2022-08-05 | Stop reason: SDUPTHER

## 2022-04-01 RX ORDER — HYDROCODONE BITARTRATE AND ACETAMINOPHEN 7.5; 325 MG/1; MG/1
TABLET ORAL
Qty: 30 TABLET | Refills: 0 | Status: SHIPPED | OUTPATIENT
Start: 2022-04-01 | End: 2022-06-03 | Stop reason: SDUPTHER

## 2022-04-01 RX ORDER — HYDROCODONE BITARTRATE AND ACETAMINOPHEN 7.5; 325 MG/1; MG/1
1 TABLET ORAL
Qty: 30 TABLET | Refills: 0 | Status: SHIPPED | OUTPATIENT
Start: 2022-04-01 | End: 2022-06-03 | Stop reason: SDUPTHER

## 2022-04-01 NOTE — PROGRESS NOTES
Assessment:  1  Chronic pain syndrome    2  Chronic left hip pain    3  Encounter for long-term opiate analgesic use    4  Uncomplicated opioid dependence ([address] )        Plan:  While the patient was in the office today, I did have a thorough conversation regarding their chronic pain syndrome, medication management, and treatment plan options  Patient is being seen for follow-up visit  Overall, he reports that his pain remains pretty well managed with his current medication regimen including Norco 7 5/325 as needed as well as gabapentin 300 mg 3 times daily  He states that these medications reduces pain by up to 50%  He denies any significant side effects from these medications  Continue hydrocodone 7 5/325 at bedtime if needed  The patient's opioid scripts were sent to their pharmacy electronically and was given a 2 month supply of prescriptions with a Do Not Fill date(s) of 04/07/2022, 05/05/2022  Continue gabapentin 300 mg 3 times daily to address the neuropathic component of his pain  Prescription was sent to his pharmacy with refills  There are risks associated with opioid medications, including dependence, addiction and tolerance  The patient understands and agrees to use these medications only as prescribed  Potential side effects of the medications include, but are not limited to, constipation, drowsiness, addiction, impaired judgment and risk of fatal overdose if not taken as prescribed  The patient was warned against driving while taking sedation medications  Sharing medications is a felony  At this point in time, the patient is showing no signs of addiction, abuse, diversion or suicidal ideation  A urine drug screen was collected at today's office visit as part of our medication management protocol  The point of care testing results were appropriate for what was being prescribed  The specimen will be sent for confirmatory testing   The drug screen is medically necessary because the patient is either dependent on opioid medication or is being considered for opioid medication therapy and the results could impact ongoing or future treatment  The drug screen is to evaluate for the presences or absence of prescribed, non-prescribed, and/or illicit drugs/substances  [place] Prescription Drug Monitoring Program report was reviewed and was appropriate     A total hip replacement has been recommended for this patient however, cannot be performed until he loses weight  He was referred to weight management and attended a few office visits  He tells me that at this point he is not following up with Pain Management, but may in the future  The patient will follow-up in 2 months for medication prescription refill and reevaluation  The patient was advised to contact the office should their symptoms worsen in the interim  The patient was agreeable and verbalized an understanding  History of Present Illness: The patient is a 62 y o  male who presents for a follow up office visit in regards to Leg Pain  The patients current symptoms include complaints of left hip pain  Current pain level is a 5/10  Quality pain is described as burning and throbbing  Current pain medications includes:  Hydrocodone 7 5/325 daily at bedtime if needed  Gabapentin 300 mg 3 times daily   The patient reports that this regimen is providing 50 % pain relief  The patient is reporting no side effects from this pain medication regimen  I have personally reviewed and/or updated the patient's past medical history, past surgical history, family history, social history, current medications, allergies, and vital signs today  Review of Systems  Review of Systems   Respiratory: Negative for shortness of breath  Cardiovascular: Negative for chest pain  Gastrointestinal: Negative for constipation, diarrhea, nausea and vomiting  Musculoskeletal: Positive for gait problem   Negative for arthralgias, joint swelling and myalgias  Neurological: Negative for dizziness, seizures and weakness  All other systems reviewed and are negative  Past Medical History:   Diagnosis Date    CAD (coronary artery disease)     s/p CABG x 2 LIMA-LAD, VG- OM1 8/5/2013    Carotid duplex 08/05/2013    20-49% bilateral stenosis    Diabetes ([facility] [address] )     History of echocardiogram 12/22/2016    EF 55% Mild LVH   Mild MR    Hyperlipidemia     Hypertension        Past Surgical History:   Procedure Laterality Date    CARDIAC CATHETERIZATION  08/02/2013    EF 50% Severe left main disease 90%, OM1 99%, 100% RCA (bypass scheduled)    CORONARY ARTERY BYPASS GRAFT  08/05/2013    CABG X2 LIMA-LAD, VG-OM1    WOUND DEBRIDEMENT Right 8/12/2020    Procedure: EXCISIONAL DEBRIDEMENT Right pannus/groin;  Surgeon: Ying Fay MD;  Location: [facility];  Service: Trauma    WOUND DEBRIDEMENT Right 8/13/2020    Procedure: EXCISIONAL DEBRIDEMENT, placement of drain and closure of wound;  Surgeon: Susanna Hamilton DO;  Location: [facility];  Service: General       Family History   Problem Relation Age of Onset    Diabetes Mother     Heart disease Mother     Stroke Mother     Obesity Mother     Diabetes Brother     Heart disease Brother     Obesity Brother     Cancer Neg Hx        Social History     Occupational History    Not on file   Tobacco Use    Smoking status: Former Smoker    Smokeless tobacco: Former User   Vaping Use    Vaping Use: Never used   Substance and Sexual Activity    Alcohol use: Not Currently    Drug use: Not Currently    Sexual activity: Not on file         Current Outpatient Medications:     aspirin 81 MG tablet, Take 1 tablet (81 mg total) by mouth daily, Disp: , Rfl:     atorvastatin (LIPITOR) 80 mg tablet, Take 80 mg by mouth daily, Disp: , Rfl:     gabapentin (NEURONTIN) 300 mg capsule, Take 1 capsule (300 mg total) by mouth 3 (three) times a day, Disp: 90 capsule, Rfl: 2    HYDROcodone-acetaminophen (NORCO) 7 5-325 mg per tablet, Take 1 tablet by mouth daily at bedtime as needed for pain Do not fill until 1/26/2022 Max Daily Amount: 1 tablet, Disp: 30 tablet, Rfl: 0    HYDROcodone-acetaminophen (NORCO) 7 5-325 mg per tablet, At bedtimes if needed    Do not fill until 2/23/2022, Disp: 30 tablet, Rfl: 0    ibuprofen (MOTRIN) 800 mg tablet, Take 1 tablet (800 mg total) by mouth every 8 (eight) hours as needed for mild pain, moderate pain, fever or headaches, Disp: 40 tablet, Rfl: 0    lisinopril (ZESTRIL) 5 mg tablet, Take 1 tablet by mouth daily , Disp: , Rfl:     metFORMIN (GLUCOPHAGE) 1000 MG tablet, Take 1,000 mg by mouth 2 (two) times a day with meals, Disp: , Rfl:     metFORMIN (GLUCOPHAGE) 500 mg tablet, take 1 tablet by mouth twice a day with meals, Disp: 180 tablet, Rfl: 2    metoprolol tartrate (LOPRESSOR) 50 mg tablet, Take 50 mg by mouth every 12 (twelve) hours, Disp: , Rfl:     nitroglycerin (NITROSTAT) 0 4 mg SL tablet, Place 1 tablet under the tongue as needed, Disp: , Rfl:     cyclobenzaprine (FLEXERIL) 10 mg tablet, Take 1 tablet (10 mg total) by mouth 3 (three) times a day as needed for muscle spasms (Patient not taking: Reported on 8/2/2021), Disp: 30 tablet, Rfl: 0    Dulaglutide (Trulicity) 9 31 KM/0 3SI SOPN, Inject 0 5 mL (0 75 mg total) under the skin once a week (Patient not taking: Reported on 8/2/2021), Disp: 12 pen, Rfl: 3    Empagliflozin (Jardiance) 10 MG TABS, Take 1 tablet (10 mg total) by mouth every morning (Patient not taking: Reported on 8/30/2021), Disp: 30 tablet, Rfl: 3    glucagon (Glucagon Emergency) 1 MG injection, Inject 1 mg under the skin once as needed for low blood sugar for up to 1 dose (Patient not taking: Reported on 8/2/2021), Disp: 1 kit, Rfl: 1    Insulin Pen Needle (Pen Needles) 32G X 5 MM MISC, Use 1 needle for every time insulin is injected (Patient not taking: Reported on 8/2/2021), Disp: 400 each, Rfl: 3    No Known Allergies    Physical Exam:    /82 (BP Location: Left arm, Patient Position: Sitting, Cuff Size: Large)   Pulse 98   Temp 97 7 °F (36 5 °C)   Ht 5' 4" (1 626 m)   Wt (!) 147 kg (324 lb 9 6 oz)   SpO2 98%   BMI 55 72 kg/m²     Constitutional:normal, well developed, well nourished, alert, in no distress and non-toxic and no overt pain behavior  and obese  Eyes:anicteric  HEENT:grossly intact  Neck:supple, symmetric, trachea midline and no masses   Pulmonary:even and unlabored  Cardiovascular:No edema or pitting edema present  Skin:Normal without rashes or lesions and well hydrated  Psychiatric:Mood and affect appropriate  Neurologic:Cranial Nerves II-XII grossly intact  Musculoskeletal:antalgic    Imaging  No orders to display       No orders of the defined types were placed in this encounter

## 2022-04-01 NOTE — PATIENT INSTRUCTIONS
Opioid Safety   WHAT YOU NEED TO KNOW:   An opioid medicine is used to treat pain  Examples are oxycodone, morphine, fentanyl, or codeine  Pain control and management may help you rest, heal, and return to your daily activities  You and your family will receive information about how to manage your pain at home  The instructions will include what to do if you have side effects as your pain is managed  You will get information on how to handle opioid medicine safely  You will also get suggestions on how to control pain without opioids  It is important to follow all instructions so your pain is managed effectively  DISCHARGE INSTRUCTIONS:   Call your local emergency number (911 in the [place]), or have someone else call if:   · You have a seizure  · You cannot be woken  · You have trouble staying awake and your breathing is slow or shallow  · Your speech is slurred, or you are confused  · You are dizzy or stumble when you walk  Call your doctor, or have someone close to you call if:   · You are extremely drowsy, or you have trouble staying awake or speaking  · You have pale or clammy skin  · You have blue fingernails or lips  · Your heartbeat is slower than normal     · You cannot stop vomiting  · You have questions or concerns about your condition or care  Use opioids safely:   · Take prescribed opioids exactly as directed  Opioids come with directions based on the kind and how it is given  Talk to your healthcare provider or a pharmacist if you have any questions  Do not take more than the recommended amount  Too much can cause a life-threatening overdose  Do not continue to take it after your pain stops  You may develop tolerance  This means you keep needing higher doses to get the same effect  You may also develop opioid use disorder  This means you are not able to control your opioid use  · Do not give opioids to others or take opioids that belong to someone else    The kind or amount one person takes may not be right for another  The person you share them with may also be taking medicines that do not mix with opioids  He or she may drink alcohol or use other drugs that can cause life-threatening problems when mixed with opioids  · Do not mix opioids with other medicines or alcohol  The combination can cause an overdose, or cause you to stop breathing  Alcohol, sleeping pills, and medicines such as antihistamines can make you sleepy  A combination with opioids can lead to a coma  · Do not drive or operate heavy machinery after you use an opioid  You may feel drowsy or have trouble concentrating  You can injure yourself or others if you drive or use heavy machinery when you are not alert  Your provider or pharmacist can tell you how long to wait after a dose before you do these activities  · Talk to your healthcare provider if you have any side effects  Side effects include nausea, sleepiness, itching, and trouble thinking clearly  Your provider may need to make changes to the kind or amount of opioid you are taking  He or she can also help you find ways to prevent or relieve side effects  Manage constipation:  Constipation is the most common side effect of opioid medicine  Constipation is when you have hard, dry bowel movements, or you go longer than usual between bowel movements  Tell your healthcare provider about all changes in your bowel movements while you are taking opioids  He or she may recommend laxative medicine to help you have a bowel movement  He or she may also change the kind of opioid you are taking, or change when you take it  The following are more ways you can prevent or relieve constipation:  · Drink liquids as directed  You may need to drink extra liquids to help soften and move your bowels  Ask how much liquid to drink each day and which liquids are best for you  · Eat high-fiber foods    This may help decrease constipation by adding bulk to your bowel movements  High-fiber foods include fruits, vegetables, whole-grain breads and cereals, and beans  Your healthcare provider or dietitian can help you create a high-fiber meal plan  Your provider may also recommend a fiber supplement if you cannot get enough fiber from food  · Exercise regularly  Regular physical activity can help stimulate your intestines  Walking is a good exercise to prevent or relieve constipation  Ask which exercises are best for you  · Schedule a time each day to have a bowel movement  This may help train your body to have regular bowel movements  Bend forward while you are on the toilet to help move the bowel movement out  Sit on the toilet for at least 10 minutes, even if you do not have a bowel movement  Store opioids safely:   · Store opioids where others cannot easily get them  Keep them in a locked cabinet or secure area  Do not  keep them in a purse or other bag you carry with you  A person may be looking for something else and find the opioids  · Make sure opioids are stored out of the reach of children  A child can easily overdose on opioids  Opioids may look like candy to a small child  The best way to dispose of opioids: The laws vary by country and area  In the [place], the best way is to return the opioids through a take-back program  This program is offered by the [employer]  The following are options for using the program:  · Take the opioids to a JOR[facility] collection site  The site is often a law enforcement center  Call your local law enforcement center for scheduled take-back days in your area  You will be given information on where to go if the collection site is in a different location  · Take the opioids to an approved pharmacy or hospital   A pharmacy or hospital may be set up as a collection site  You will need to ask if it is a JORJE collection site if you were not directed there   A pharmacy or doctor's office may not be able to take back opioids unless it is a JORJE site  · Use a mail-back system  This means you are given containers to put the opioids into  You will then mail them in the containers  · Use a take-back drop box  This is a place to leave the opioids at any time  People and animals will not be able to get into the box  Your local law enforcement agency can tell you where to find a drop box in your area  Other safe ways to dispose of opioids: The medicine may come with disposal instructions  The instructions may vary depending on the brand of medicine you are using  Instructions may come in a Medication Guide, but not every medicine has one  You may instead get instructions from your pharmacy or doctor  Follow instructions carefully  The following are general guidelines to follow:  · Find out if you can flush the opioid  Some opioids can be flushed down the toilet or poured into the sink  You will need to contact authorities in your area to see if this is an option for you  The FDA also offers a list of medicines that are safe to flush down the toilet  You can check the list if you cannot get the information for your local area  · Ask your waste management company about rules for putting opioids in the trash  The company will be able to give you specific directions  Scratch out personal information on the original medicine label so it cannot be read  Then put it in the trash  Do not label the trash or put any information on it about the opioids  It should look like regular household trash so no one is tempted to look for the opioids  Keep the trash out of the reach of children and animals  Always make sure trash is secure  · Talk to officials if you live in a facility  If you live in a nursing home or assisted living center, talk to an official  The person will know the rules for your area  Other ways to manage pain:   · Ask your healthcare provider about non-opioid medicines to control pain  Some medicines may even work better than opioids, depending on the cause of your pain  Nonprescription medicines include NSAIDs (such as ibuprofen) and acetaminophen  Prescription medicines include muscle relaxers, antidepressants, and steroids  · Pain may be managed without any medicines  Some ways to relieve pain include massage, aromatherapy, or meditation  Physical or occupational therapy may also help  For more information:   · Drug Enforcement Administration  [address] [facility] [address]  Phone: 4- 836 - 397-7199  Web Address: [email or web address]    · [address] and Drug Administration  [place] , [address] [facility]  Phone: 9- 055 - [phone]  Web Address: http://[employer]/    Follow up with your doctor or pain specialist as directed: You may need to have your dose adjusted  Your doctor or pain specialist can also help you find ways to manage pain without opioids  Write down your questions so you remember to ask them during your visits  © Copyright [employer] 2022 Information is for End User's use only and may not be sold, redistributed or otherwise used for commercial purposes  All illustrations and images included in CareNotes® are the copyrighted property of [employer]  or Giancarlo Cardona  The above information is an  only  It is not intended as medical advice for individual conditions or treatments  Talk to your doctor, nurse or pharmacist before following any medical regimen to see if it is safe and effective for you

## 2022-04-06 LAB
6MAM UR QL CFM: NEGATIVE NG/ML
7AMINOCLONAZEPAM UR QL CFM: NEGATIVE NG/ML
A-OH ALPRAZ UR QL CFM: NEGATIVE NG/ML
ACCEPTABLE CREAT UR QL: NORMAL MG/DL
ACCEPTIBLE SP GR UR QL: NORMAL
AMPHET UR QL CFM: NEGATIVE NG/ML
AMPHET UR QL CFM: NEGATIVE NG/ML
BUPRENORPHINE UR QL CFM: NEGATIVE NG/ML
BUTALBITAL UR QL CFM: NEGATIVE NG/ML
BZE UR QL CFM: NEGATIVE NG/ML
CODEINE UR QL CFM: NEGATIVE NG/ML
DESIPRAMINE UR QL CFM: NEGATIVE NG/ML
DESIPRAMINE UR QL CFM: NEGATIVE NG/ML
EDDP UR QL CFM: NEGATIVE NG/ML
ETHYL GLUCURONIDE UR QL CFM: NEGATIVE NG/ML
ETHYL SULFATE UR QL SCN: NEGATIVE NG/ML
EUTYLONE UR QL: NEGATIVE NG/ML
FENTANYL UR QL CFM: NEGATIVE NG/ML
GLIADIN IGG SER IA-ACNC: NEGATIVE NG/ML
GLUCOSE 30M P 50 G LAC PO SERPL-MCNC: NEGATIVE NG/ML
HYDROCODONE UR QL CFM: NORMAL NG/ML
HYDROCODONE UR QL CFM: NORMAL NG/ML
HYDROMORPHONE UR QL CFM: NORMAL NG/ML
IMIPRAMINE UR QL CFM: NEGATIVE NG/ML
LORAZEPAM UR QL CFM: NEGATIVE NG/ML
MDMA UR QL CFM: NEGATIVE NG/ML
ME-PHENIDATE UR QL CFM: NEGATIVE NG/ML
MEPERIDINE UR QL CFM: NEGATIVE NG/ML
METHADONE UR QL CFM: NEGATIVE NG/ML
METHAMPHET UR QL CFM: NEGATIVE NG/ML
MORPHINE UR QL CFM: NEGATIVE NG/ML
MORPHINE UR QL CFM: NEGATIVE NG/ML
NITRITE UR QL: NORMAL UG/ML
NORBUPRENORPHINE UR QL CFM: NEGATIVE NG/ML
NORDIAZEPAM UR QL CFM: NEGATIVE NG/ML
NORFENTANYL UR QL CFM: NEGATIVE NG/ML
NORHYDROCODONE UR QL CFM: NORMAL NG/ML
NORHYDROCODONE UR QL CFM: NORMAL NG/ML
NORMEPERIDINE UR QL CFM: NEGATIVE NG/ML
NOROXYCODONE UR QL CFM: NEGATIVE NG/ML
OLANZAPINE QUANTIFICATION: NEGATIVE NG/ML
OPC-3373 QUANTIFICATION: NEGATIVE
OXAZEPAM UR QL CFM: NEGATIVE NG/ML
OXYCODONE UR QL CFM: NEGATIVE NG/ML
OXYMORPHONE UR QL CFM: NEGATIVE NG/ML
OXYMORPHONE UR QL CFM: NEGATIVE NG/ML
PCP UR QL CFM: NEGATIVE NG/ML
PHENOBARB UR QL CFM: NEGATIVE NG/ML
SECOBARBITAL UR QL CFM: NEGATIVE NG/ML
SL AMB 3-METHYL-FENTANYL QUANTIFICATION: NORMAL NG/ML
SL AMB 4-ANPP QUANTIFICATION: NORMAL NG/ML
SL AMB 4-FIBF QUANTIFICATION: NORMAL NG/ML
SL AMB 5F-ADB-M7 METABOLITE QUANTIFICATION: NEGATIVE NG/ML
SL AMB 7-OH-MITRAGYNINE (KRATOM ALKALOID) QUANTIFICATION: NEGATIVE NG/ML
SL AMB AB-FUBINACA-M3 METABOLITE QUANTIFICATION: NEGATIVE NG/ML
SL AMB ACETYL FENTANYL QUANTIFICATION: NORMAL NG/ML
SL AMB ACETYL NORFENTANYL QUANTIFICATION: NORMAL NG/ML
SL AMB ACRYL FENTANYL QUANTIFICATION: NORMAL NG/ML
SL AMB BUTRYL FENTANYL QUANTIFICATION: NORMAL NG/ML
SL AMB CARFENTANIL QUANTIFICATION: NORMAL NG/ML
SL AMB CLOZAPINE QUANTIFICATION: NEGATIVE NG/ML
SL AMB CTHC (MARIJUANA METABOLITE) QUANTIFICATION: NEGATIVE NG/ML
SL AMB CYCLOPROPYL FENTANYL QUANTIFICATION: NORMAL NG/ML
SL AMB DEXTROMETHORPHAN QUANTIFICATION: NEGATIVE NG/ML
SL AMB DEXTRORPHAN (DEXTROMETHORPHAN METABOLITE) QUANT: NEGATIVE NG/ML
SL AMB DEXTRORPHAN (DEXTROMETHORPHAN METABOLITE) QUANT: NEGATIVE NG/ML
SL AMB FURANYL FENTANYL QUANTIFICATION: NORMAL NG/ML
SL AMB HALOPERIDOL  QUANTIFICATION: NEGATIVE NG/ML
SL AMB HALOPERIDOL METABOLITE QUANTIFICATION: NEGATIVE NG/ML
SL AMB HYDROXYRISPERIDONE QUANTIFICATION: NEGATIVE NG/ML
SL AMB JWH018 METABOLITE QUANTIFICATION: NEGATIVE NG/ML
SL AMB JWH073 METABOLITE QUANTIFICATION: NEGATIVE NG/ML
SL AMB MDMB-FUBINACA-M1 METABOLITE QUANTIFICATION: NEGATIVE NG/ML
SL AMB METHOXYACETYL FENTANYL QUANTIFICATION: NORMAL NG/ML
SL AMB METHYLONE QUANTIFICATION: NEGATIVE NG/ML
SL AMB N-DESMETHYL U-47700 QUANTIFICATION: NORMAL NG/ML
SL AMB N-DESMETHYL-TRAMADOL QUANTIFICATION: NEGATIVE NG/ML
SL AMB N-DESMETHYLCLOZAPINE QUANTIFICATION: NEGATIVE NG/ML
SL AMB NORQUETIAPINE QUANTIFICATION: NEGATIVE NG/ML
SL AMB PHENTERMINE QUANTIFICATION: NEGATIVE NG/ML
SL AMB QUETIAPINE QUANTIFICATION: NEGATIVE NG/ML
SL AMB RCS4 METABOLITE QUANTIFICATION: NEGATIVE NG/ML
SL AMB RISPERIDONE QUANTIFICATION: NEGATIVE NG/ML
SL AMB RITALINIC ACID QUANTIFICATION: NEGATIVE NG/ML
SL AMB U-47700 QUANTIFICATION: NORMAL NG/ML
SPECIMEN DRAWN SERPL: NEGATIVE NG/ML
SPECIMEN PH ACCEPTABLE UR: NORMAL
TAPENTADOL UR QL CFM: NEGATIVE NG/ML
TEMAZEPAM UR QL CFM: NEGATIVE NG/ML
TEMAZEPAM UR QL CFM: NEGATIVE NG/ML
TRAMADOL UR QL CFM: NEGATIVE NG/ML
URATE/CREAT 24H UR: NEGATIVE NG/ML

## 2022-06-03 ENCOUNTER — OFFICE VISIT (OUTPATIENT)
Dept: PAIN MEDICINE | Facility: CLINIC | Age: 62
End: 2022-06-03
Payer: COMMERCIAL

## 2022-06-03 VITALS
HEART RATE: 92 BPM | DIASTOLIC BLOOD PRESSURE: 98 MMHG | WEIGHT: 315 LBS | HEIGHT: 64 IN | SYSTOLIC BLOOD PRESSURE: 148 MMHG | BODY MASS INDEX: 53.78 KG/M2

## 2022-06-03 DIAGNOSIS — M16.12 PRIMARY OSTEOARTHRITIS OF LEFT HIP: ICD-10-CM

## 2022-06-03 DIAGNOSIS — E11.40 DIABETIC NEUROPATHY, PAINFUL (HCC): ICD-10-CM

## 2022-06-03 DIAGNOSIS — E66.01 MORBID OBESITY (HCC): ICD-10-CM

## 2022-06-03 DIAGNOSIS — G89.29 CHRONIC LEFT HIP PAIN: ICD-10-CM

## 2022-06-03 DIAGNOSIS — G89.4 CHRONIC PAIN SYNDROME: Primary | ICD-10-CM

## 2022-06-03 DIAGNOSIS — M25.552 CHRONIC LEFT HIP PAIN: ICD-10-CM

## 2022-06-03 PROCEDURE — 99214 OFFICE O/P EST MOD 30 MIN: CPT | Performed by: NURSE PRACTITIONER

## 2022-06-03 RX ORDER — HYDROCODONE BITARTRATE AND ACETAMINOPHEN 7.5; 325 MG/1; MG/1
1 TABLET ORAL
Qty: 30 TABLET | Refills: 0 | Status: SHIPPED | OUTPATIENT
Start: 2022-06-03 | End: 2022-08-05 | Stop reason: SDUPTHER

## 2022-06-03 RX ORDER — HYDROCODONE BITARTRATE AND ACETAMINOPHEN 7.5; 325 MG/1; MG/1
TABLET ORAL
Qty: 30 TABLET | Refills: 0 | Status: SHIPPED | OUTPATIENT
Start: 2022-06-03 | End: 2022-08-05 | Stop reason: SDUPTHER

## 2022-06-03 NOTE — PROGRESS NOTES
Assessment:  1  Chronic pain syndrome    2  Chronic left hip pain    3  Primary osteoarthritis of left hip    4  Morbid obesity ([address] )    5  Diabetic neuropathy, painful ([facility] [address] )        Plan:  While the patient was in the office today, I did have a thorough conversation regarding their chronic pain syndrome, medication management, and treatment plan options  Patient is being seen for follow-up visit  Overall, he reports about 60 percent improvement in his symptoms with the use of hydrocodone and gabapentin  He denies any significant side effects from medications  Continue hydrocodone 7 5/325 at bedtime  The patient's opioid scripts were sent to their pharmacy electronically and was given a 2 month supply of prescriptions with a Do Not Fill date(s) of 06/15/2022, 07/12/2022  Continue gabapentin 300 milligrams 3 times daily  Prescription was sent to his pharmacy  [place] Prescription Drug Monitoring Program report was reviewed and was appropriate     There are risks associated with opioid medications, including dependence, addiction and tolerance  The patient understands and agrees to use these medications only as prescribed  Potential side effects of the medications include, but are not limited to, constipation, drowsiness, addiction, impaired judgment and risk of fatal overdose if not taken as prescribed  The patient was warned against driving while taking sedation medications  Sharing medications is a felony  At this point in time, the patient is showing no signs of addiction, abuse, diversion or suicidal ideation  The patient will follow-up in 8 weeks for medication prescription refill and reevaluation  The patient was advised to contact the office should their symptoms worsen in the interim  The patient was agreeable and verbalized an understanding  History of Present Illness:     The patient is a 65 y o  male last seen on   04/01/2022  who presents for a follow up office visit in regards to chronic pain secondary to  chronic pain syndrome, chronic left hip pain, primary arthritis of the left hip, diabetic neuropathy  The patient currently reports complaints of left hip pain, left knee pain  Current pain level is a 5/10  Quality pain is described as burning    Current pain medications includes:  Hydrocodone 7 5/325 at bedtime, gabapentin 300 milligrams 3 times daily   The patient reports that this regimen is providing 60 % pain relief  The patient is reporting no side effects from this pain medication regimen  Pain Contract Signed:  08/30/2021  Last Urine Drug Screen:  04/01/2022    I have personally reviewed and/or updated the patient's past medical history, past surgical history, family history, social history, current medications, allergies, and vital signs today  Review of Systems:    Review of Systems   Constitutional: Negative for chills and fever  HENT: Negative for ear pain and sore throat  Eyes: Negative for pain and visual disturbance  Respiratory: Negative for cough and shortness of breath  Cardiovascular: Negative for chest pain and palpitations  Gastrointestinal: Negative for abdominal pain and vomiting  Genitourinary: Negative for dysuria and hematuria  Musculoskeletal: Positive for gait problem  Negative for arthralgias and back pain  Joint stiffness     Skin: Negative for color change and rash  Neurological: Negative for seizures and syncope  All other systems reviewed and are negative  Past Medical History:   Diagnosis Date    CAD (coronary artery disease)     s/p CABG x 2 LIMA-LAD, VG- OM1 8/5/2013    Carotid duplex 08/05/2013    20-49% bilateral stenosis    Diabetes ([facility] [address] )     History of echocardiogram 12/22/2016    EF 55% Mild LVH   Mild MR    Hyperlipidemia     Hypertension        Past Surgical History:   Procedure Laterality Date    CARDIAC CATHETERIZATION  08/02/2013    EF 50% Severe left main disease 90%, OM1 99%, 100% RCA (bypass scheduled)    CORONARY ARTERY BYPASS GRAFT  08/05/2013    CABG X2 LIMA-LAD, VG-OM1    WOUND DEBRIDEMENT Right 8/12/2020    Procedure: EXCISIONAL DEBRIDEMENT Right pannus/groin;  Surgeon: Mi Mary MD;  [facility];  Service: Trauma    WOUND DEBRIDEMENT Right 8/13/2020    Procedure: EXCISIONAL DEBRIDEMENT, placement of drain and closure of wound;  Surgeon: Tushar Bennett DO;  [facility];  Service: General       Family History   Problem Relation Age of Onset    Diabetes Mother     Heart disease Mother     Stroke Mother     Obesity Mother     Diabetes Brother     Heart disease Brother     Obesity Brother     Cancer Neg Hx        Social History     Occupational History    Not on file   Tobacco Use    Smoking status: Former Smoker    Smokeless tobacco: Former User   Vaping Use    Vaping Use: Never used   Substance and Sexual Activity    Alcohol use: Not Currently    Drug use: Not Currently    Sexual activity: Not on file         Current Outpatient Medications:     aspirin 81 MG tablet, Take 1 tablet (81 mg total) by mouth daily, Disp: , Rfl:     atorvastatin (LIPITOR) 80 mg tablet, Take 80 mg by mouth daily, Disp: , Rfl:     gabapentin (NEURONTIN) 300 mg capsule, Take 1 capsule (300 mg total) by mouth 3 (three) times a day, Disp: 90 capsule, Rfl: 2    HYDROcodone-acetaminophen (NORCO) 7 5-325 mg per tablet, Take 1 tablet by mouth daily at bedtime as needed for pain Do not fill until 7/12//2022 Max Daily Amount: 1 tablet, Disp: 30 tablet, Rfl: 0    HYDROcodone-acetaminophen (NORCO) 7 5-325 mg per tablet, At bedtimes if needed    Do not fill until 6/15/2022, Disp: 30 tablet, Rfl: 0    ibuprofen (MOTRIN) 800 mg tablet, Take 1 tablet (800 mg total) by mouth every 8 (eight) hours as needed for mild pain, moderate pain, fever or headaches, Disp: 40 tablet, Rfl: 0    lisinopril (ZESTRIL) 5 mg tablet, Take 1 tablet by mouth daily , Disp: , Rfl:     metFORMIN (GLUCOPHAGE) 1000 MG tablet, Take 1,000 mg by mouth 2 (two) times a day with meals, Disp: , Rfl:     metFORMIN (GLUCOPHAGE) 500 mg tablet, take 1 tablet by mouth twice a day with meals, Disp: 180 tablet, Rfl: 2    metoprolol tartrate (LOPRESSOR) 50 mg tablet, Take 50 mg by mouth every 12 (twelve) hours, Disp: , Rfl:     nitroglycerin (NITROSTAT) 0 4 mg SL tablet, Place 1 tablet under the tongue as needed, Disp: , Rfl:     cyclobenzaprine (FLEXERIL) 10 mg tablet, Take 1 tablet (10 mg total) by mouth 3 (three) times a day as needed for muscle spasms (Patient not taking: No sig reported), Disp: 30 tablet, Rfl: 0    Dulaglutide (Trulicity) 6 05 LI/0 8VK SOPN, Inject 0 5 mL (0 75 mg total) under the skin once a week (Patient not taking: No sig reported), Disp: 12 pen, Rfl: 3    Empagliflozin (Jardiance) 10 MG TABS, Take 1 tablet (10 mg total) by mouth every morning (Patient not taking: No sig reported), Disp: 30 tablet, Rfl: 3    glucagon (Glucagon Emergency) 1 MG injection, Inject 1 mg under the skin once as needed for low blood sugar for up to 1 dose (Patient not taking: No sig reported), Disp: 1 kit, Rfl: 1    Insulin Pen Needle (Pen Needles) 32G X 5 MM MISC, Use 1 needle for every time insulin is injected (Patient not taking: No sig reported), Disp: 400 each, Rfl: 3    No Known Allergies    Physical Exam:    /98   Pulse 92   Ht 5' 4" (1 626 m)   Wt (!) 146 kg (321 lb)   BMI 55 10 kg/m²     Constitutional:normal, well developed, well nourished, alert, in no distress and non-toxic and no overt pain behavior   and obese  Eyes:anicteric  HEENT:grossly intact  Neck:supple, symmetric, trachea midline and no masses   Pulmonary:even and unlabored  Cardiovascular:No edema or pitting edema present  Skin:Normal without rashes or lesions and well hydrated  Psychiatric:Mood and affect appropriate  Neurologic:Cranial Nerves II-XII grossly intact  Musculoskeletal:antalgic and ambulates with cane      Imaging  No orders to display         No orders of the defined types were placed in this encounter

## 2022-06-03 NOTE — PATIENT INSTRUCTIONS

## 2022-08-05 ENCOUNTER — OFFICE VISIT (OUTPATIENT)
Dept: PAIN MEDICINE | Facility: CLINIC | Age: 62
End: 2022-08-05
Payer: COMMERCIAL

## 2022-08-05 VITALS
DIASTOLIC BLOOD PRESSURE: 79 MMHG | HEIGHT: 64 IN | BODY MASS INDEX: 53.78 KG/M2 | HEART RATE: 91 BPM | SYSTOLIC BLOOD PRESSURE: 171 MMHG | WEIGHT: 315 LBS

## 2022-08-05 DIAGNOSIS — Z79.891 LONG-TERM CURRENT USE OF OPIATE ANALGESIC: Primary | ICD-10-CM

## 2022-08-05 DIAGNOSIS — M16.12 PRIMARY OSTEOARTHRITIS OF LEFT HIP: ICD-10-CM

## 2022-08-05 DIAGNOSIS — E66.01 MORBID OBESITY (HCC): ICD-10-CM

## 2022-08-05 DIAGNOSIS — E11.9 TYPE 2 DIABETES MELLITUS WITHOUT COMPLICATION, WITHOUT LONG-TERM CURRENT USE OF INSULIN (HCC): ICD-10-CM

## 2022-08-05 DIAGNOSIS — F11.20 UNCOMPLICATED OPIOID DEPENDENCE (HCC): ICD-10-CM

## 2022-08-05 DIAGNOSIS — G89.29 CHRONIC LEFT HIP PAIN: ICD-10-CM

## 2022-08-05 DIAGNOSIS — G89.4 CHRONIC PAIN SYNDROME: ICD-10-CM

## 2022-08-05 DIAGNOSIS — M25.552 CHRONIC LEFT HIP PAIN: ICD-10-CM

## 2022-08-05 DIAGNOSIS — E11.40 DIABETIC NEUROPATHY, PAINFUL (HCC): ICD-10-CM

## 2022-08-05 PROCEDURE — 99214 OFFICE O/P EST MOD 30 MIN: CPT | Performed by: NURSE PRACTITIONER

## 2022-08-05 PROCEDURE — 80305 DRUG TEST PRSMV DIR OPT OBS: CPT | Performed by: NURSE PRACTITIONER

## 2022-08-05 RX ORDER — HYDROCODONE BITARTRATE AND ACETAMINOPHEN 7.5; 325 MG/1; MG/1
1 TABLET ORAL
Qty: 30 TABLET | Refills: 0 | Status: SHIPPED | OUTPATIENT
Start: 2022-08-05 | End: 2022-10-07 | Stop reason: SDUPTHER

## 2022-08-05 RX ORDER — HYDROCODONE BITARTRATE AND ACETAMINOPHEN 7.5; 325 MG/1; MG/1
TABLET ORAL
Qty: 30 TABLET | Refills: 0 | Status: SHIPPED | OUTPATIENT
Start: 2022-08-05 | End: 2022-09-23

## 2022-08-05 RX ORDER — GABAPENTIN 300 MG/1
300 CAPSULE ORAL 3 TIMES DAILY
Qty: 90 CAPSULE | Refills: 2 | Status: SHIPPED | OUTPATIENT
Start: 2022-08-05

## 2022-08-05 NOTE — PROGRESS NOTES
Assessment:  1  Long-term current use of opiate analgesic    2  Chronic pain syndrome    3  Uncomplicated opioid dependence ([facility] [address] )    4  Type 2 diabetes mellitus without complication, without long-term current use of insulin ([facility] [address] )    5  Chronic left hip pain    6  Primary osteoarthritis of left hip    7  Morbid obesity ([facility] [address] )    8  Diabetic neuropathy, painful ([facility] )        Plan:  While the patient was in the office today, I did have a thorough conversation regarding their chronic pain syndrome, medication management, and treatment plan options  Patient is being seen for follow-up visit  He continues with low back and left hip pain  He states that the hip pain is his biggest issue  On exam, there is significant tenderness over the left greater trochanteric bursa  His last hemoglobin A1c that was performed in January was greater than 11  Explain him that he is not a candidate for steroid injections until his sugars improved  Patient tells me that he is in between PCPs right now  As such, I will order an updated hemoglobin A1c I urged him to get established with a new family doctor or follow up with his current family doctor since he has a lot of comorbidities that need to be addressed  The patient was agreeable and verbalized an understanding  Continue hydrocodone 7 5/325 once daily at bedtime as needed  The patient's opioid scripts were sent to their pharmacy electronically and was given a 2 month supply of prescriptions with a Do Not Fill date(s) of 08/15/2022, 09/12/2022  Continue gabapentin 300 mg 3 times daily  Prescription was sent to his pharmacy with refills  The patient will follow-up in 2 month for medication prescription refill and reevaluation  The patient was advised to contact the office should their symptoms worsen in the interim  The patient was agreeable and verbalized an understanding        [place] Prescription Drug Monitoring Program report was reviewed and was appropriate A urine drug screen was collected at today's office visit as part of our medication management protocol  The point of care testing results were appropriate for what was being prescribed  The specimen will be sent for confirmatory testing  The drug screen is medically necessary because the patient is either dependent on opioid medication or is being considered for opioid medication therapy and the results could impact ongoing or future treatment  The drug screen is to evaluate for the presences or absence of prescribed, non-prescribed, and/or illicit drugs/substances  There are risks associated with opioid medications, including dependence, addiction and tolerance  The patient understands and agrees to use these medications only as prescribed  Potential side effects of the medications include, but are not limited to, constipation, drowsiness, addiction, impaired judgment and risk of fatal overdose if not taken as prescribed  The patient was warned against driving while taking sedation medications  Sharing medications is a felony  At this point in time, the patient is showing no signs of addiction, abuse, diversion or suicidal ideation  The patient will follow-up in 8 weeks for medication prescription refill and reevaluation  The patient was advised to contact the office should their symptoms worsen in the interim  The patient was agreeable and verbalized an understanding  History of Present Illness: The patient is a 65 y o  male last seen on 06/03/2022 who presents for a follow up office visit in regards to chronic pain secondary to chronic pain syndrome, chronic low back pain, left hip pain, left greater trochanteric bursitis  The patient currently reports complaints of left hip pain  Current pain level is a 5/10  Pain can go up to a 9/10 at times  Current pain medications includes:  Hydrocodone 7 5/325 once daily if needed for pain, gabapentin 300 mg 3 times daily     The patient reports that this regimen is providing 40-50 % pain relief  The patient is reporting no side effects from this pain medication regimen  Pain Contract Signed:   Last Urine Drug Screen:  08/05/2022    I have personally reviewed and/or updated the patient's past medical history, past surgical history, family history, social history, current medications, allergies, and vital signs today  Review of Systems:    Review of Systems   Constitutional: Negative for chills and fever  HENT: Negative for ear pain and sore throat  Eyes: Negative for pain and visual disturbance  Respiratory: Negative for cough and shortness of breath  Cardiovascular: Negative for chest pain and palpitations  Gastrointestinal: Negative for abdominal pain and vomiting  Genitourinary: Negative for dysuria and hematuria  Musculoskeletal: Positive for gait problem  Negative for arthralgias and back pain  Decreased range of motion     Skin: Negative for color change and rash  Neurological: Negative for seizures and syncope  All other systems reviewed and are negative  Past Medical History:   Diagnosis Date    CAD (coronary artery disease)     s/p CABG x 2 LIMA-LAD, VG- OM1 8/5/2013    Carotid duplex 08/05/2013    20-49% bilateral stenosis    Diabetes [facility] [address] )     History of echocardiogram 12/22/2016    EF 55% Mild LVH   Mild MR    Hyperlipidemia     Hypertension        Past Surgical History:   Procedure Laterality Date    CARDIAC CATHETERIZATION  08/02/2013    EF 50% Severe left main disease 90%, OM1 99%, 100% RCA (bypass scheduled)    CORONARY ARTERY BYPASS GRAFT  08/05/2013    CABG X2 LIMA-LAD, VG-OM1    WOUND DEBRIDEMENT Right 8/12/2020    Procedure: EXCISIONAL DEBRIDEMENT Right pannus/groin;  Surgeon: Shekhar Del Cid MD;  [facility];  Service: Trauma    WOUND DEBRIDEMENT Right 8/13/2020    Procedure: EXCISIONAL DEBRIDEMENT, placement of drain and closure of wound;  Surgeon: Chiqui Stephenson DO; [facility]: [facility];  Service: General       Family History   Problem Relation Age of Onset    Diabetes Mother     Heart disease Mother     Stroke Mother     Obesity Mother     Diabetes Brother     Heart disease Brother     Obesity Brother     Cancer Neg Hx        Social History     Occupational History    Not on file   Tobacco Use    Smoking status: Former Smoker    Smokeless tobacco: Former User   Vaping Use    Vaping Use: Never used   Substance and Sexual Activity    Alcohol use: Not Currently    Drug use: Not Currently    Sexual activity: Not on file         Current Outpatient Medications:     aspirin 81 MG tablet, Take 1 tablet (81 mg total) by mouth daily, Disp: , Rfl:     atorvastatin (LIPITOR) 80 mg tablet, Take 80 mg by mouth daily, Disp: , Rfl:     gabapentin (NEURONTIN) 300 mg capsule, Take 1 capsule (300 mg total) by mouth 3 (three) times a day, Disp: 90 capsule, Rfl: 2    HYDROcodone-acetaminophen (NORCO) 7 5-325 mg per tablet, Take 1 tablet by mouth daily at bedtime as needed for pain Do not fill until 9/12//2022 Max Daily Amount: 1 tablet, Disp: 30 tablet, Rfl: 0    HYDROcodone-acetaminophen (NORCO) 7 5-325 mg per tablet, At bedtimes if needed    Do not fill until 8/15/2022, Disp: 30 tablet, Rfl: 0    ibuprofen (MOTRIN) 800 mg tablet, Take 1 tablet (800 mg total) by mouth every 8 (eight) hours as needed for mild pain, moderate pain, fever or headaches, Disp: 40 tablet, Rfl: 0    lisinopril (ZESTRIL) 5 mg tablet, Take 1 tablet by mouth daily , Disp: , Rfl:     metFORMIN (GLUCOPHAGE) 1000 MG tablet, Take 1,000 mg by mouth 2 (two) times a day with meals, Disp: , Rfl:     metFORMIN (GLUCOPHAGE) 500 mg tablet, take 1 tablet by mouth twice a day with meals, Disp: 180 tablet, Rfl: 2    metoprolol tartrate (LOPRESSOR) 50 mg tablet, Take 50 mg by mouth every 12 (twelve) hours, Disp: , Rfl:     nitroglycerin (NITROSTAT) 0 4 mg SL tablet, Place 1 tablet under the tongue as needed, Disp: , Rfl:     cyclobenzaprine (FLEXERIL) 10 mg tablet, Take 1 tablet (10 mg total) by mouth 3 (three) times a day as needed for muscle spasms (Patient not taking: No sig reported), Disp: 30 tablet, Rfl: 0    Dulaglutide (Trulicity) 7 55 ZR/4 3EL SOPN, Inject 0 5 mL (0 75 mg total) under the skin once a week (Patient not taking: No sig reported), Disp: 12 pen, Rfl: 3    Empagliflozin (Jardiance) 10 MG TABS, Take 1 tablet (10 mg total) by mouth every morning (Patient not taking: No sig reported), Disp: 30 tablet, Rfl: 3    glucagon (Glucagon Emergency) 1 MG injection, Inject 1 mg under the skin once as needed for low blood sugar for up to 1 dose (Patient not taking: No sig reported), Disp: 1 kit, Rfl: 1    Insulin Pen Needle (Pen Needles) 32G X 5 MM MISC, Use 1 needle for every time insulin is injected (Patient not taking: No sig reported), Disp: 400 each, Rfl: 3    No Known Allergies    Physical Exam:    BP (!) 171/79   Pulse 91   Ht 5' 4" (1 626 m)   Wt (!) 143 kg (316 lb)   BMI 54 24 kg/m²     Constitutional:normal, well developed, well nourished, alert, in no distress and non-toxic and no overt pain behavior  Eyes:anicteric  HEENT:grossly intact  Neck:supple, symmetric, trachea midline and no masses   Pulmonary:even and unlabored  Cardiovascular:No edema or pitting edema present  Skin:Normal without rashes or lesions and well hydrated  Psychiatric:Mood and affect appropriate  Neurologic:Cranial Nerves II-XII grossly intact  Musculoskeletal:antalgic and Patient is using 2 canes to ambulate        Imaging  No orders to display         Orders Placed This Encounter   Procedures    MM ALL_Prescribed Meds and Special Instructions    MM DT_Alprazolam Definitive Test    MM DT_Amitriptyline Definitive Test    MM DT_Amphetamine Definitive Test    MM DT_Aripiprazole Definitive Test    MM DT_Benzodiazepines Screen    MM DT_Buprenorphine Definitive Test    MM DT_Bupropion Definitive Test    MM DT_Butalbital Definitive Test    MM DT_Carisoprodol Definitive Test    MM DT_Clonazepam Definitive Test    MM DT_Clozapine Definitive Test    MM DT_Cocaine Definitive Test    MM DT_Desipramine Definitive Test    MM Diazepam Definitive Test    MM DT_Codeine Definitive Test    MM DT_Fentanyl Definitive Test    MM DT_Haloperidol Definitive Test    MM DT_Heroin Definitive Test    MM DT_Hydrocodone Definitive Test    MM DT_Hydromorphone Definitive Test    MM DT_Kratom Definitive Test    MM Lorazepam Definitive Test    MM DT_MDMA Definitive Test    MM DT_Methadone Definitive Test    MM DT_Methamphetamine Definitive Test    MM DT_Morphine Definitive Test    MM DT_Naltrexone Definitive Test    MM DT_Oxazepam Definitive Test    MM DT_Oxycodone Definitive Test    MM DT_Oxymorphone Definitive Test    MM DT_Phencyclidine Definitive Test    MM DT_Phenobarbital Definitive Test    MM DT_Phentermine Definitive Test    MM DT_Pregablin Definitive    MM DT_Risperidone Definitive Test    MM DT_Secobarbital Definitive Test    MM DT_Tapentadol Definitive Test    MM DT_Temazapam Definitive Test    MM DT_THC Definitive Test    MM DT_Tramadol Definitive Test    MM DT_Validity Creatinine    MM DT_Validity Oxidant    MM DT_Validity pH    MM DT_Validity Specific    Hemoglobin A1C

## 2022-08-05 NOTE — PATIENT INSTRUCTIONS

## 2022-08-09 LAB
6MAM UR QL CFM: NEGATIVE NG/ML
7AMINOCLONAZEPAM UR QL CFM: NEGATIVE NG/ML
A-OH ALPRAZ UR QL CFM: NEGATIVE NG/ML
ACCEPTABLE CREAT UR QL: NORMAL MG/DL
ACCEPTIBLE SP GR UR QL: NORMAL
AMITRIP UR QL CFM: NEGATIVE NG/ML
AMPHET UR QL CFM: NEGATIVE NG/ML
AMPHET UR QL CFM: NEGATIVE NG/ML
BENZODIAZ UR QL SCN: NORMAL
BUPRENORPHINE UR QL CFM: NEGATIVE NG/ML
BUTALBITAL UR QL CFM: NEGATIVE NG/ML
BZE UR QL CFM: NEGATIVE NG/ML
CARISOPRODOL UR QL CFM: NEGATIVE NG/ML
CODEINE UR QL CFM: NEGATIVE NG/ML
DESIPRAMINE UR QL CFM: NEGATIVE NG/ML
EDDP UR QL CFM: NEGATIVE NG/ML
FENTANYL UR QL CFM: NEGATIVE NG/ML
GLIADIN IGG SER IA-ACNC: NEGATIVE NG/ML
GLUCOSE 30M P 50 G LAC PO SERPL-MCNC: NEGATIVE NG/ML
HYDROCODONE UR QL CFM: NORMAL NG/ML
HYDROCODONE UR QL CFM: NORMAL NG/ML
HYDROMORPHONE UR QL CFM: NORMAL NG/ML
LORAZEPAM UR QL CFM: NEGATIVE NG/ML
MDMA UR QL CFM: NEGATIVE NG/ML
MEPROBAMATE UR QL CFM: NEGATIVE NG/ML
METHADONE UR QL CFM: NEGATIVE NG/ML
METHAMPHET UR QL CFM: NEGATIVE NG/ML
MORPHINE UR QL CFM: NEGATIVE NG/ML
MORPHINE UR QL CFM: NEGATIVE NG/ML
NALTREXONE UR QL CFM: NEGATIVE NG/ML
NITRITE UR QL: NORMAL UG/ML
NORBUPRENORPHINE UR QL CFM: NEGATIVE NG/ML
NORDIAZEPAM UR QL CFM: NEGATIVE NG/ML
NORFENTANYL UR QL CFM: NEGATIVE NG/ML
NORHYDROCODONE UR QL CFM: NORMAL NG/ML
NORHYDROCODONE UR QL CFM: NORMAL NG/ML
NOROXYCODONE UR QL CFM: NEGATIVE NG/ML
NORTRIP UR QL CFM: NEGATIVE NG/ML
OPC-3373 QUANTIFICATION: NEGATIVE
OXAZEPAM UR QL CFM: NEGATIVE NG/ML
OXYCODONE UR QL CFM: NEGATIVE NG/ML
OXYMORPHONE UR QL CFM: NEGATIVE NG/ML
OXYMORPHONE UR QL CFM: NEGATIVE NG/ML
PCP UR QL CFM: NEGATIVE NG/ML
PHENOBARB UR QL CFM: NEGATIVE NG/ML
RESULT ALL_PRESCRIBED MEDS AND SPECIAL INSTRUCTIONS: NORMAL
SECOBARBITAL UR QL CFM: NEGATIVE NG/ML
SL AMB 3-METHYL-FENTANYL QUANTIFICATION: NORMAL NG/ML
SL AMB 4-ANPP QUANTIFICATION: NORMAL NG/ML
SL AMB 4-FIBF QUANTIFICATION: NORMAL NG/ML
SL AMB 7-OH-MITRAGYNINE (KRATOM ALKALOID) QUANTIFICATION: NEGATIVE NG/ML
SL AMB ACETYL FENTANYL QUANTIFICATION: NORMAL NG/ML
SL AMB ACETYL NORFENTANYL QUANTIFICATION: NORMAL NG/ML
SL AMB ACRYL FENTANYL QUANTIFICATION: NORMAL NG/ML
SL AMB BUTRYL FENTANYL QUANTIFICATION: NORMAL NG/ML
SL AMB CARFENTANIL QUANTIFICATION: NORMAL NG/ML
SL AMB CLOZAPINE QUANTIFICATION: NEGATIVE NG/ML
SL AMB CTHC (MARIJUANA METABOLITE) QUANTIFICATION: NEGATIVE NG/ML
SL AMB CYCLOPROPYL FENTANYL QUANTIFICATION: NORMAL NG/ML
SL AMB FURANYL FENTANYL QUANTIFICATION: NORMAL NG/ML
SL AMB HALOPERIDOL  QUANTIFICATION: NEGATIVE NG/ML
SL AMB HALOPERIDOL METABOLITE QUANTIFICATION: NEGATIVE NG/ML
SL AMB HYDROXYBUPROPION QUANTIFICATION: NEGATIVE NG/ML
SL AMB HYDROXYRISPERIDONE QUANTIFICATION: NEGATIVE NG/ML
SL AMB METHOXYACETYL FENTANYL QUANTIFICATION: NORMAL NG/ML
SL AMB N-DESMETHYL U-47700 QUANTIFICATION: NORMAL NG/ML
SL AMB N-DESMETHYL-TRAMADOL QUANTIFICATION: NEGATIVE NG/ML
SL AMB N-DESMETHYLCLOZAPINE QUANTIFICATION: NEGATIVE NG/ML
SL AMB PHENTERMINE QUANTIFICATION: NEGATIVE NG/ML
SL AMB PREGABALIN QUANTIFICATION: NEGATIVE
SL AMB RISPERIDONE QUANTIFICATION: NEGATIVE NG/ML
SL AMB U-47700 QUANTIFICATION: NORMAL NG/ML
SMOOTH MUSCLE AB TITR SER IF: NEGATIVE NG/ML
SPECIMEN PH ACCEPTABLE UR: NORMAL
TAPENTADOL UR QL CFM: NEGATIVE NG/ML
TEMAZEPAM UR QL CFM: NEGATIVE NG/ML
TEMAZEPAM UR QL CFM: NEGATIVE NG/ML
TRAMADOL UR QL CFM: NEGATIVE NG/ML
URATE/CREAT 24H UR: NEGATIVE NG/ML

## 2022-08-26 ENCOUNTER — TELEPHONE (OUTPATIENT)
Dept: PAIN MEDICINE | Facility: CLINIC | Age: 62
End: 2022-08-26

## 2022-08-26 NOTE — TELEPHONE ENCOUNTER
Patient received a bill from [facility] ([facility]) AT [place] in the amount of  $499 for a pathology test he is calling to find out is this an error?

## 2022-08-29 NOTE — TELEPHONE ENCOUNTER
Spoke with pt and advise he has to call [facility] with questions about there billing  Pt understood and was appreciative

## 2022-09-06 ENCOUNTER — APPOINTMENT (OUTPATIENT)
Dept: LAB | Facility: CLINIC | Age: 62
End: 2022-09-06
Payer: COMMERCIAL

## 2022-09-06 DIAGNOSIS — E11.9 TYPE 2 DIABETES MELLITUS WITHOUT COMPLICATION, WITHOUT LONG-TERM CURRENT USE OF INSULIN (HCC): ICD-10-CM

## 2022-09-06 PROCEDURE — 83036 HEMOGLOBIN GLYCOSYLATED A1C: CPT

## 2022-09-06 PROCEDURE — 36415 COLL VENOUS BLD VENIPUNCTURE: CPT

## 2022-09-07 ENCOUNTER — TELEPHONE (OUTPATIENT)
Dept: PAIN MEDICINE | Facility: CLINIC | Age: 62
End: 2022-09-07

## 2022-09-07 LAB
EST. AVERAGE GLUCOSE BLD GHB EST-MCNC: 292 MG/DL
HBA1C MFR BLD: 11.8 %

## 2022-09-07 PROCEDURE — 3046F HEMOGLOBIN A1C LEVEL >9.0%: CPT | Performed by: FAMILY MEDICINE

## 2022-09-07 NOTE — TELEPHONE ENCOUNTER
----- Message from Marcio Lombardi, [place] sent at 9/7/2022  9:04 AM EDT -----  Please call patient and let him know that his hemoglobin A1c is very elevated at 11 8  As discussed during our last visit,  He needs to follow-up with his PCP 
S/w pt and advised of same  Pt appreciative of call 
No

## 2022-09-23 ENCOUNTER — OFFICE VISIT (OUTPATIENT)
Dept: FAMILY MEDICINE CLINIC | Facility: CLINIC | Age: 62
End: 2022-09-23
Payer: COMMERCIAL

## 2022-09-23 VITALS
SYSTOLIC BLOOD PRESSURE: 118 MMHG | OXYGEN SATURATION: 95 % | HEART RATE: 104 BPM | TEMPERATURE: 97.8 F | HEIGHT: 64 IN | BODY MASS INDEX: 53.78 KG/M2 | WEIGHT: 315 LBS | DIASTOLIC BLOOD PRESSURE: 62 MMHG

## 2022-09-23 DIAGNOSIS — I10 BENIGN ESSENTIAL HTN: ICD-10-CM

## 2022-09-23 DIAGNOSIS — Z11.4 SCREENING FOR HIV (HUMAN IMMUNODEFICIENCY VIRUS): ICD-10-CM

## 2022-09-23 DIAGNOSIS — G89.4 CHRONIC PAIN SYNDROME: ICD-10-CM

## 2022-09-23 DIAGNOSIS — E78.2 MIXED HYPERLIPIDEMIA: ICD-10-CM

## 2022-09-23 DIAGNOSIS — Z23 ENCOUNTER FOR IMMUNIZATION: ICD-10-CM

## 2022-09-23 DIAGNOSIS — F11.20 UNCOMPLICATED OPIOID DEPENDENCE (HCC): ICD-10-CM

## 2022-09-23 DIAGNOSIS — E11.40 TYPE 2 DIABETES MELLITUS WITH DIABETIC NEUROPATHY, WITHOUT LONG-TERM CURRENT USE OF INSULIN (HCC): ICD-10-CM

## 2022-09-23 DIAGNOSIS — Z13.5 SCREENING FOR DIABETIC RETINOPATHY: ICD-10-CM

## 2022-09-23 DIAGNOSIS — Z76.89 ENCOUNTER TO ESTABLISH CARE WITH NEW DOCTOR: Primary | ICD-10-CM

## 2022-09-23 DIAGNOSIS — K21.9 GASTROESOPHAGEAL REFLUX DISEASE, UNSPECIFIED WHETHER ESOPHAGITIS PRESENT: ICD-10-CM

## 2022-09-23 DIAGNOSIS — Z11.59 NEED FOR HEPATITIS C SCREENING TEST: ICD-10-CM

## 2022-09-23 DIAGNOSIS — I25.10 CORONARY ARTERY DISEASE INVOLVING NATIVE HEART WITHOUT ANGINA PECTORIS, UNSPECIFIED VESSEL OR LESION TYPE: ICD-10-CM

## 2022-09-23 DIAGNOSIS — R21 RASH OF GENITAL AREA: ICD-10-CM

## 2022-09-23 PROCEDURE — 3725F SCREEN DEPRESSION PERFORMED: CPT | Performed by: FAMILY MEDICINE

## 2022-09-23 PROCEDURE — 90472 IMMUNIZATION ADMIN EACH ADD: CPT

## 2022-09-23 PROCEDURE — 99204 OFFICE O/P NEW MOD 45 MIN: CPT | Performed by: FAMILY MEDICINE

## 2022-09-23 PROCEDURE — 90750 HZV VACC RECOMBINANT IM: CPT

## 2022-09-23 PROCEDURE — 3074F SYST BP LT 130 MM HG: CPT | Performed by: FAMILY MEDICINE

## 2022-09-23 PROCEDURE — 90471 IMMUNIZATION ADMIN: CPT

## 2022-09-23 PROCEDURE — 3078F DIAST BP <80 MM HG: CPT | Performed by: FAMILY MEDICINE

## 2022-09-23 PROCEDURE — 90715 TDAP VACCINE 7 YRS/> IM: CPT

## 2022-09-23 RX ORDER — DULAGLUTIDE 0.75 MG/.5ML
0.75 INJECTION, SOLUTION SUBCUTANEOUS WEEKLY
Qty: 2 ML | Refills: 0 | Status: SHIPPED | OUTPATIENT
Start: 2022-09-23 | End: 2022-10-25 | Stop reason: DRUGHIGH

## 2022-09-23 RX ORDER — BLOOD-GLUCOSE METER
KIT MISCELLANEOUS
Qty: 1 KIT | Refills: 0 | Status: SHIPPED | OUTPATIENT
Start: 2022-09-23

## 2022-09-23 RX ORDER — FAMOTIDINE 20 MG/1
20 TABLET, FILM COATED ORAL DAILY PRN
Qty: 30 TABLET | Refills: 0 | Status: SHIPPED | OUTPATIENT
Start: 2022-09-23 | End: 2022-10-19

## 2022-09-23 RX ORDER — BLOOD SUGAR DIAGNOSTIC
STRIP MISCELLANEOUS
Qty: 200 EACH | Refills: 3 | Status: SHIPPED | OUTPATIENT
Start: 2022-09-23

## 2022-09-23 RX ORDER — NYSTATIN 100000 [USP'U]/G
POWDER TOPICAL 2 TIMES DAILY
Qty: 15 G | Refills: 0 | Status: SHIPPED | OUTPATIENT
Start: 2022-09-23 | End: 2022-10-26 | Stop reason: SDUPTHER

## 2022-09-23 RX ORDER — LANCETS 33 GAUGE
EACH MISCELLANEOUS
Qty: 200 EACH | Refills: 3 | Status: SHIPPED | OUTPATIENT
Start: 2022-09-23

## 2022-09-23 NOTE — PATIENT INSTRUCTIONS
Restart the Aspirin at 81 mg daily    Restart the trulicity 3 11 mg once weekly  Log your sugars after you get the new supplies, twice daily  - once fasting in the morning prior to breakfasr  The second check- check different times either before lunch, before dinner or before bedtime   Records all these and what time you are checking and bring that notebook to the next visit    Trial pepcid as needed for the reflux symptoms

## 2022-09-23 NOTE — PROGRESS NOTES
Assessment/Plan:      Diagnoses and all orders for this visit:    Encounter to establish care with new doctor    Need for hepatitis C screening test  -     Hepatitis C Antibody ([facility]); Future    Screening for HIV (human immunodeficiency virus)  -     HIV 1/2 Antigen/Antibody (4th Generation) w Reflex [facility]; Future    Encounter for immunization  Comments:  due flu and covid at same time once we brings his booster vaccination card or documentation  Orders:  -     TDAP VACCINE GREATER THAN OR EQUAL TO 6YO IM  -     Cancel: influenza vaccine, quadrivalent, recombinant, PF, 0 5 mL, for patients 18 yr+ ([facility]OK)  -     Zoster Vaccine Recombinant IM    Screening for diabetic retinopathy  -     Cancel: IRIS Diabetic eye exam    Type 2 diabetes mellitus with diabetic neuropathy, without long-term current use of insulin ([facility] )  Comments:  uncontrolled diabetes  restart injection in addition with metformin  new supplies  Check BID and log  bring to next visit  Orders:  -     Blood Glucose Monitoring Suppl (OneTouch Verio Reflect) w/Device KIT; Check blood sugars twice daily  Please substitute with appropriate alternative as covered by patient's insurance  Dx: E11 65  -     glucose blood (OneTouch Verio) test strip; Check blood sugars twice daily  Please substitute with appropriate alternative as covered by patient's insurance  Dx: E11 65  -     OneTouch Delica Lancets 36F MISC; Check blood sugars twice daily  Please substitute with appropriate alternative as covered by patient's insurance  Dx: E11 65  -     Dulaglutide (Trulicity) 8 70 QM/2 3WZ SOPN; Inject 0 5 mL (0 75 mg total) under the skin once a week for 4 doses    Coronary artery disease involving native heart without angina pectoris, unspecified vessel or lesion type  Comments:  restart ASA   continue statin and BB    Benign essential HTN  Comments:  continue lisinopril and metoprolol    Mixed hyperlipidemia  Comments:  continue atorvastatin    Chronic pain syndrome    Uncomplicated opioid dependence (HCC)    Gastroesophageal reflux disease, unspecified whether esophagitis present  Comments:  wanted omeprazole but switch to pepcid as he is taking prn  Orders:  -     famotidine (PEPCID) 20 mg tablet; Take 1 tablet (20 mg total) by mouth daily as needed for heartburn    Rash of genital area  Comments:  reports rash occasionally of genitals  nystatin cream used previously  advised powder given not currently present  Orders:  -     nystatin (MYCOSTATIN) powder; Apply topically 2 (two) times a day          Return in about 4 weeks (around 10/21/2022) for Diabetes  The following portions of the patient's history were reviewed and updated as appropriate: allergies, current medications, past family history, past medical history, past social history, past surgical history, and problem list      Subjective:     Patient ID: Hai Sanchez is a 65 y o  male  HPI    Hai Sanchez presents today to establish care  Patient doing well today  History was reviewed as below  Previous PCP listed as Ragini Marquez DO  Switching doctors so that he can be seen later in the evening    T2DM with painful neuropathy: Previously on metformin 5820 BID, trulicity 9 17 weekly and jardiance 10 mg, atorvastatin 80 mg daily  Reports never was taking the other diabetic medications other than metformin  He stopped the other ones on his own  Is willing to restart the injection    Lab Results   Component Value Date    HGBA1C 11 8 (H) 09/06/2022         Hx of CAD: on ASA 81 mg daily, atorvastatin 80 daily, metoprolol 50 BID, nitro prn    HTN: lisinopril 5 mg daily and metoprolol 50 BID    HLD: atorvastatin 80 mg daily  Chronic pain w/ uncomplicated opiod dependence: Follows with pain management  On gabapentin 300 mg TID  On norco 7 5 325 prn bedtime          PHQ-9 Depression Screening    Little interest or pleasure in doing things: 0 - not at all  Feeling down, depressed, or hopeless: 0 - not at all          Past Medical History:   Diagnosis Date    CAD (coronary artery disease)     s/p CABG x 2 LIMA-LAD, VG- OM1 8/5/2013    Carotid duplex 08/05/2013    20-49% bilateral stenosis    Diabetes ([facility] )     History of echocardiogram 12/22/2016    EF 55% Mild LVH   Mild MR    Hyperlipidemia     Hypertension        Past Surgical History:   Procedure Laterality Date    CARDIAC CATHETERIZATION  08/02/2013    EF 50% Severe left main disease 90%, OM1 99%, 100% RCA (bypass scheduled)    CORONARY ARTERY BYPASS GRAFT  08/05/2013    CABG X2 LIMA-LAD, VG-OM1    WOUND DEBRIDEMENT Right 8/12/2020    Procedure: EXCISIONAL DEBRIDEMENT Right pannus/groin;  Surgeon: Emily Black MD;  [facility];  Service: Trauma    WOUND DEBRIDEMENT Right 8/13/2020    Procedure: EXCISIONAL DEBRIDEMENT, placement of drain and closure of wound;  Surgeon: Abilio Hendrix DO;  [facility];  Service: General       Family History   Problem Relation Age of Onset    Diabetes Mother     Heart disease Mother     Stroke Mother     Obesity Mother     Diabetes Brother     Heart disease Brother     Obesity Brother     Cancer Neg Hx        Social History     Tobacco Use    Smoking status: Former Smoker    Smokeless tobacco: Former User   Vaping Use    Vaping Use: Never used   Substance Use Topics    Alcohol use: Not Currently    Drug use: Not Currently      Social History     Social History Narrative    Not on file       No Known Allergies    Current Outpatient Medications on File Prior to Visit   Medication Sig Dispense Refill    atorvastatin (LIPITOR) 80 mg tablet Take 80 mg by mouth daily      cyclobenzaprine (FLEXERIL) 10 mg tablet Take 1 tablet (10 mg total) by mouth 3 (three) times a day as needed for muscle spasms 30 tablet 0    gabapentin (NEURONTIN) 300 mg capsule Take 1 capsule (300 mg total) by mouth 3 (three) times a day 90 capsule 2    HYDROcodone-acetaminophen (NORCO) 7 5-325 mg per tablet Take 1 tablet by mouth daily at bedtime as needed for pain Do not fill until 9/12//2022 Max Daily Amount: 1 tablet 30 tablet 0    ibuprofen (MOTRIN) 800 mg tablet Take 1 tablet (800 mg total) by mouth every 8 (eight) hours as needed for mild pain, moderate pain, fever or headaches 40 tablet 0    lisinopril (ZESTRIL) 5 mg tablet Take 1 tablet by mouth daily       metFORMIN (GLUCOPHAGE) 1000 MG tablet Take 1,000 mg by mouth 2 (two) times a day with meals      metoprolol tartrate (LOPRESSOR) 50 mg tablet Take 50 mg by mouth every 12 (twelve) hours      nitroglycerin (NITROSTAT) 0 4 mg SL tablet Place 1 tablet under the tongue as needed      aspirin 81 MG tablet Take 1 tablet (81 mg total) by mouth daily (Patient not taking: Reported on 9/23/2022)      [DISCONTINUED] Dulaglutide (Trulicity) 8 74 KE/6 2VD SOPN Inject 0 5 mL (0 75 mg total) under the skin once a week (Patient not taking: No sig reported) 12 pen 3    [DISCONTINUED] Empagliflozin (Jardiance) 10 MG TABS Take 1 tablet (10 mg total) by mouth every morning (Patient not taking: No sig reported) 30 tablet 3    [DISCONTINUED] glucagon (Glucagon Emergency) 1 MG injection Inject 1 mg under the skin once as needed for low blood sugar for up to 1 dose (Patient not taking: No sig reported) 1 kit 1    [DISCONTINUED] HYDROcodone-acetaminophen (NORCO) 7 5-325 mg per tablet At bedtimes if needed  Do not fill until 8/15/2022 (Patient not taking: Reported on 9/23/2022) 30 tablet 0    [DISCONTINUED] Insulin Pen Needle (Pen Needles) 32G X 5 MM MISC Use 1 needle for every time insulin is injected (Patient not taking: No sig reported) 400 each 3    [DISCONTINUED] metFORMIN (GLUCOPHAGE) 500 mg tablet take 1 tablet by mouth twice a day with meals (Patient not taking: Reported on 9/23/2022) 180 tablet 2     No current facility-administered medications on file prior to visit            Review of Systems      Objective:    Vitals:    09/23/22 1430   BP: 118/62   BP Location: Left arm   Patient Position: Sitting   Cuff Size: Large   Pulse: 104   Temp: 97 8 °F (36 6 °C)   TempSrc: Tympanic   SpO2: 95%   Weight: (!) 143 kg (315 lb)   Height: 5' 4" (1 626 m)         Physical Exam  Vitals and nursing note reviewed  Constitutional:       General: He is not in acute distress  Appearance: Normal appearance  He is obese  He is not ill-appearing or toxic-appearing  HENT:      Head: Normocephalic and atraumatic  Nose: Nose normal       Mouth/Throat:      Mouth: Mucous membranes are moist       Pharynx: Oropharynx is clear  No oropharyngeal exudate or posterior oropharyngeal erythema  Eyes:      General: No scleral icterus  Extraocular Movements: Extraocular movements intact  Conjunctiva/sclera: Conjunctivae normal       Pupils: Pupils are equal, round, and reactive to light  Cardiovascular:      Rate and Rhythm: Normal rate and regular rhythm  Heart sounds: Normal heart sounds  No murmur heard  No friction rub  No gallop  Pulmonary:      Effort: Pulmonary effort is normal  No respiratory distress  Breath sounds: Normal breath sounds  No wheezing or rales  Neurological:      Mental Status: He is alert

## 2022-09-26 DIAGNOSIS — M25.511 ACUTE PAIN OF RIGHT SHOULDER: ICD-10-CM

## 2022-09-26 DIAGNOSIS — R21 RASH OF GENITAL AREA: ICD-10-CM

## 2022-09-26 DIAGNOSIS — M62.838 TRAPEZIUS MUSCLE SPASM: ICD-10-CM

## 2022-09-26 DIAGNOSIS — E78.2 MIXED HYPERLIPIDEMIA: Primary | ICD-10-CM

## 2022-09-26 DIAGNOSIS — I10 BENIGN ESSENTIAL HTN: ICD-10-CM

## 2022-09-27 PROCEDURE — 4010F ACE/ARB THERAPY RXD/TAKEN: CPT | Performed by: FAMILY MEDICINE

## 2022-09-27 RX ORDER — IBUPROFEN 800 MG/1
800 TABLET ORAL EVERY 8 HOURS PRN
Qty: 90 TABLET | Refills: 0 | OUTPATIENT
Start: 2022-09-27

## 2022-09-27 RX ORDER — NYSTATIN 100000 [USP'U]/G
POWDER TOPICAL 2 TIMES DAILY
Qty: 15 G | Refills: 0 | OUTPATIENT
Start: 2022-09-27

## 2022-09-27 RX ORDER — LISINOPRIL 5 MG/1
5 TABLET ORAL DAILY
Qty: 90 TABLET | Refills: 1 | Status: SHIPPED | OUTPATIENT
Start: 2022-09-27

## 2022-09-27 RX ORDER — ATORVASTATIN CALCIUM 80 MG/1
80 TABLET, FILM COATED ORAL DAILY
Qty: 90 TABLET | Refills: 1 | Status: SHIPPED | OUTPATIENT
Start: 2022-09-27

## 2022-10-07 ENCOUNTER — OFFICE VISIT (OUTPATIENT)
Dept: PAIN MEDICINE | Facility: CLINIC | Age: 62
End: 2022-10-07
Payer: COMMERCIAL

## 2022-10-07 VITALS
HEART RATE: 91 BPM | DIASTOLIC BLOOD PRESSURE: 96 MMHG | BODY MASS INDEX: 52.34 KG/M2 | SYSTOLIC BLOOD PRESSURE: 166 MMHG | HEIGHT: 64 IN | WEIGHT: 306.6 LBS

## 2022-10-07 DIAGNOSIS — G89.4 CHRONIC PAIN SYNDROME: Primary | ICD-10-CM

## 2022-10-07 DIAGNOSIS — M16.12 PRIMARY OSTEOARTHRITIS OF LEFT HIP: ICD-10-CM

## 2022-10-07 DIAGNOSIS — M70.62 GREATER TROCHANTERIC BURSITIS OF LEFT HIP: ICD-10-CM

## 2022-10-07 DIAGNOSIS — G89.29 CHRONIC LEFT HIP PAIN: ICD-10-CM

## 2022-10-07 DIAGNOSIS — M25.552 CHRONIC LEFT HIP PAIN: ICD-10-CM

## 2022-10-07 DIAGNOSIS — E66.01 MORBID OBESITY (HCC): ICD-10-CM

## 2022-10-07 PROCEDURE — 99214 OFFICE O/P EST MOD 30 MIN: CPT | Performed by: NURSE PRACTITIONER

## 2022-10-07 RX ORDER — HYDROCODONE BITARTRATE AND ACETAMINOPHEN 7.5; 325 MG/1; MG/1
1 TABLET ORAL
Qty: 30 TABLET | Refills: 0 | Status: SHIPPED | OUTPATIENT
Start: 2022-10-07

## 2022-10-07 NOTE — PROGRESS NOTES
Assessment:  1  Chronic pain syndrome    2  Chronic left hip pain    3  Primary osteoarthritis of left hip    4  Greater trochanteric bursitis of left hip    5  Morbid obesity ([address] )        Plan:  While the patient was in the office today, I did have a thorough conversation regarding their chronic pain syndrome, medication management, and treatment plan options  Patient is being seen for follow-up visit  Patient's most recent hemoglobin A1c was 11 8 on 09/06/2022  He recently started seeing a PCP and has been placed on Trulicity injection weekly and metformin  He tells me that his blood sugars have been improving  Patient will be a candidate for left greater trochanteric bursa injection when his sugars are controlled  Continue hydrocodone 5/325 once daily if needed for pain  The patient's opioid scripts were sent to their pharmacy electronically and was given a 2 month supply of prescriptions with a Do Not Fill date(s) of 10/14/2022, 11/11/2022  Continue gabapentin 300 mg 3 times daily  He did not require refill this medication during today's visit  [place] Prescription Drug Monitoring Program report was reviewed and was appropriate     There are risks associated with opioid medications, including dependence, addiction and tolerance  The patient understands and agrees to use these medications only as prescribed  Potential side effects of the medications include, but are not limited to, constipation, drowsiness, addiction, impaired judgment and risk of fatal overdose if not taken as prescribed  The patient was warned against driving while taking sedation medications  Sharing medications is a felony  At this point in time, the patient is showing no signs of addiction, abuse, diversion or suicidal ideation  While the patient was in the office today an opioid contract was thoroughly reviewed and signed by the patient   The patient was given adequate time to ask questions in regards to the contract and a signed copy was sent home for his/her records  The patient will follow-up in 8 weeks for medication prescription refill and reevaluation  The patient was advised to contact the office should their symptoms worsen in the interim  The patient was agreeable and verbalized an understanding  History of Present Illness: The patient is a 65 y o  male last seen on 08/05/2022 who presents for a follow up office visit in regards to chronic pain secondary to chronic pain syndrome, chronic left hip pain, osteoarthritis left hip, left greater trochanteric bursitis  The patient currently reports complaints of left hip pain  Current pain level is an 8/10  Quality pain is described as sharp and shooting  Current pain medications includes:  Hydrocodone 5/325 once daily if needed for pain, gabapentin 300 mg 3 times daily   The patient reports that this regimen is providing 50 % pain relief  The patient is reporting no side effects from this pain medication regimen  Pain Contract Signed: 10/7/2022  Last Urine Drug Screen: 8/5/2022    I have personally reviewed and/or updated the patient's past medical history, past surgical history, family history, social history, current medications, allergies, and vital signs today  Review of Systems:    Review of Systems   Constitutional: Negative for unexpected weight change  HENT: Negative for hearing loss  Eyes: Negative for visual disturbance  Respiratory: Negative for shortness of breath  Cardiovascular: Negative for leg swelling  Gastrointestinal: Negative for constipation  Endocrine: Negative for polyuria  Genitourinary: Negative for difficulty urinating  Musculoskeletal: Positive for gait problem  Negative for joint swelling and myalgias  Skin: Negative for rash  Neurological: Negative for weakness and headaches  Psychiatric/Behavioral: Negative for decreased concentration  All other systems reviewed and are negative          Past Medical History:   Diagnosis Date    CAD (coronary artery disease)     s/p CABG x 2 LIMA-LAD, VG- OM1 8/5/2013    Carotid duplex 08/05/2013    20-49% bilateral stenosis    Diabetes ([facility] [address] )     History of echocardiogram 12/22/2016    EF 55% Mild LVH  Mild MR    Hyperlipidemia     Hypertension        Past Surgical History:   Procedure Laterality Date    CARDIAC CATHETERIZATION  08/02/2013    EF 50% Severe left main disease 90%, OM1 99%, 100% RCA (bypass scheduled)    CORONARY ARTERY BYPASS GRAFT  08/05/2013    CABG X2 LIMA-LAD, VG-OM1    WOUND DEBRIDEMENT Right 8/12/2020    Procedure: EXCISIONAL DEBRIDEMENT Right pannus/groin;  Surgeon: Shekhar Del Cid MD;  [facility];  Service: Trauma    WOUND DEBRIDEMENT Right 8/13/2020    Procedure: EXCISIONAL DEBRIDEMENT, placement of drain and closure of wound;  Surgeon: Chiqui Stephenson DO;  [facility];  Service: General       Family History   Problem Relation Age of Onset    Diabetes Mother     Heart disease Mother     Stroke Mother     Obesity Mother     Diabetes Brother     Heart disease Brother     Obesity Brother     Cancer Neg Hx        Social History     Occupational History    Not on file   Tobacco Use    Smoking status: Former Smoker    Smokeless tobacco: Former User   Vaping Use    Vaping Use: Never used   Substance and Sexual Activity    Alcohol use: Not Currently    Drug use: Not Currently    Sexual activity: Not on file         Current Outpatient Medications:     atorvastatin (LIPITOR) 80 mg tablet, Take 1 tablet (80 mg total) by mouth daily, Disp: 90 tablet, Rfl: 1    Blood Glucose Monitoring Suppl (OneTouch Verio Reflect) w/Device KIT, Check blood sugars twice daily  Please substitute with appropriate alternative as covered by patient's insurance   Dx: E11 65, Disp: 1 kit, Rfl: 0    cyclobenzaprine (FLEXERIL) 10 mg tablet, Take 1 tablet (10 mg total) by mouth 3 (three) times a day as needed for muscle spasms, Disp: 30 tablet, Rfl: 0    Dulaglutide (Trulicity) 5 46 TL/0 7IS SOPN, Inject 0 5 mL (0 75 mg total) under the skin once a week for 4 doses, Disp: 2 mL, Rfl: 0    famotidine (PEPCID) 20 mg tablet, Take 1 tablet (20 mg total) by mouth daily as needed for heartburn, Disp: 30 tablet, Rfl: 0    gabapentin (NEURONTIN) 300 mg capsule, Take 1 capsule (300 mg total) by mouth 3 (three) times a day, Disp: 90 capsule, Rfl: 2    glucose blood (OneTouch Verio) test strip, Check blood sugars twice daily  Please substitute with appropriate alternative as covered by patient's insurance  Dx: E11 65, Disp: 200 each, Rfl: 3    HYDROcodone-acetaminophen (NORCO) 7 5-325 mg per tablet, Take 1 tablet by mouth daily at bedtime as needed for pain Do not fill until 11/11/2022 Max Daily Amount: 1 tablet, Disp: 30 tablet, Rfl: 0    HYDROcodone-acetaminophen (NORCO) 7 5-325 mg per tablet, Take 1 tablet by mouth daily at bedtime as needed for pain Do not fill until 10/14/2022 Max Daily Amount: 1 tablet, Disp: 30 tablet, Rfl: 0    ibuprofen (MOTRIN) 800 mg tablet, Take 1 tablet (800 mg total) by mouth every 8 (eight) hours as needed for mild pain, moderate pain, fever or headaches, Disp: 40 tablet, Rfl: 0    lisinopril (ZESTRIL) 5 mg tablet, Take 1 tablet (5 mg total) by mouth daily, Disp: 90 tablet, Rfl: 1    metFORMIN (GLUCOPHAGE) 1000 MG tablet, Take 1,000 mg by mouth 2 (two) times a day with meals, Disp: , Rfl:     metoprolol tartrate (LOPRESSOR) 50 mg tablet, Take 50 mg by mouth every 12 (twelve) hours, Disp: , Rfl:     nitroglycerin (NITROSTAT) 0 4 mg SL tablet, Place 1 tablet under the tongue as needed, Disp: , Rfl:     nystatin (MYCOSTATIN) powder, Apply topically 2 (two) times a day, Disp: 15 g, Rfl: 0    OneTouch Delica Lancets 51V MISC, Check blood sugars twice daily  Please substitute with appropriate alternative as covered by patient's insurance   Dx: E11 65, Disp: 200 each, Rfl: 3    aspirin 81 MG tablet, Take 1 tablet (81 mg total) by mouth daily (Patient not taking: No sig reported), Disp: , Rfl:     No Known Allergies    Physical Exam:    /96   Pulse 91   Ht 5' 4" (1 626 m)   Wt (!) 139 kg (306 lb 9 6 oz)   BMI 52 63 kg/m²     Constitutional:normal, well developed, well nourished, alert, in no distress and non-toxic and no overt pain behavior  and obese  Eyes:anicteric  HEENT:grossly intact  Neck:supple, symmetric, trachea midline and no masses   Pulmonary:even and unlabored  Cardiovascular:No edema or pitting edema present  Skin:Normal without rashes or lesions and well hydrated  Psychiatric:Mood and affect appropriate  Neurologic:Cranial Nerves II-XII grossly intact  Musculoskeletal:antalgic and ambulates with cane      Imaging  No orders to display         No orders of the defined types were placed in this encounter

## 2022-10-07 NOTE — PATIENT INSTRUCTIONS

## 2022-10-19 DIAGNOSIS — K21.9 GASTROESOPHAGEAL REFLUX DISEASE, UNSPECIFIED WHETHER ESOPHAGITIS PRESENT: ICD-10-CM

## 2022-10-19 RX ORDER — FAMOTIDINE 20 MG/1
TABLET, FILM COATED ORAL
Qty: 30 TABLET | Refills: 0 | Status: SHIPPED | OUTPATIENT
Start: 2022-10-19

## 2022-10-25 ENCOUNTER — OFFICE VISIT (OUTPATIENT)
Dept: FAMILY MEDICINE CLINIC | Facility: CLINIC | Age: 62
End: 2022-10-25
Payer: COMMERCIAL

## 2022-10-25 VITALS
DIASTOLIC BLOOD PRESSURE: 80 MMHG | HEART RATE: 70 BPM | WEIGHT: 309 LBS | HEIGHT: 64 IN | BODY MASS INDEX: 52.75 KG/M2 | SYSTOLIC BLOOD PRESSURE: 128 MMHG | TEMPERATURE: 98.5 F | OXYGEN SATURATION: 99 %

## 2022-10-25 DIAGNOSIS — Z13.5 SCREENING FOR DIABETIC RETINOPATHY: ICD-10-CM

## 2022-10-25 DIAGNOSIS — B35.3 TINEA PEDIS OF BOTH FEET: ICD-10-CM

## 2022-10-25 DIAGNOSIS — E11.40 TYPE 2 DIABETES MELLITUS WITH DIABETIC NEUROPATHY, WITHOUT LONG-TERM CURRENT USE OF INSULIN (HCC): Primary | ICD-10-CM

## 2022-10-25 DIAGNOSIS — R21 RASH OF GENITAL AREA: ICD-10-CM

## 2022-10-25 DIAGNOSIS — B35.1 ONYCHOMYCOSIS: ICD-10-CM

## 2022-10-25 DIAGNOSIS — Z23 ENCOUNTER FOR IMMUNIZATION: ICD-10-CM

## 2022-10-25 PROCEDURE — 92250 FUNDUS PHOTOGRAPHY W/I&R: CPT | Performed by: FAMILY MEDICINE

## 2022-10-25 PROCEDURE — 90471 IMMUNIZATION ADMIN: CPT

## 2022-10-25 PROCEDURE — 99214 OFFICE O/P EST MOD 30 MIN: CPT | Performed by: FAMILY MEDICINE

## 2022-10-25 PROCEDURE — 90682 RIV4 VACC RECOMBINANT DNA IM: CPT

## 2022-10-25 RX ORDER — BLOOD-GLUCOSE METER
1 KIT MISCELLANEOUS 2 TIMES DAILY
Qty: 1 EACH | Refills: 0 | Status: SHIPPED | OUTPATIENT
Start: 2022-10-25

## 2022-10-25 NOTE — PROGRESS NOTES
Subjective:     Patient ID: Kam San is a 65 y o  male  Assessment/Plan:      Diagnoses and all orders for this visit:    Type 2 diabetes mellitus with diabetic neuropathy, without long-term current use of insulin ([facility])  Comments:  increse dulaglutide from 0 75 weekly to 1 5 mg weekly x 4 weeks  - titrate as needed next visit  - hba1c in 3 months  Orders:  -     Diabetic foot exam; Future  -     glucose monitoring kit (FREESTYLE) monitoring kit; Use 1 each 2 (two) times a day  -     Dulaglutide 1 5 MG/0 5ML SOPN; Inject 0 5 mL (1 5 mg total) under the skin once a week for 4 doses  -     Ambulatory Referral to Podiatry; Future  -     HEMOGLOBIN A1C W/ EAG ESTIMATION; Future    Screening for diabetic retinopathy  -     IRIS Diabetic eye exam    Encounter for immunization  -     influenza vaccine, quadrivalent, recombinant, PF, 0 5 mL, for patients 18 yr+ (FLUBLOK)  -     Cancel: Pneumococcal Conjugate Vaccine 20-valent (Pcv20)    Rash of genital area  Comments:  reports rash occasionally of genitals  nystatin cream used previously  advised powder given not currently present  Orders:  -     nystatin (MYCOSTATIN) powder; Apply topically 2 (two) times a day    Tinea pedis of both feet  -     Ambulatory Referral to Podiatry; Future    Onychomycosis  -     Ambulatory Referral to Podiatry; Future              The following portions of the patient's history were reviewed and updated as appropriate: allergies, current medications, past family history, past medical history, past social history, past surgical history, and problem list      HPI    Diabetes Mellitus Type II, Follow-up: Patient here for follow-up of Type 2 diabetes mellitus       Diabetes:  Glucose control:  Lab Results   Component Value Date    HGBA1C 11 8 (H) 09/06/2022    HGBA1C 11 1 (H) 01/18/2022    HGBA1C 6 4 12/03/2020     Lab Results   Component Value Date    GLUF 342 (H) 01/18/2022    LDLCALC 90 01/18/2022    CREATININE 0 77 01/18/2022 -Checks sugars: twice daily  Fasting and then after meal   -Average readings: 200s initially now    -Hyper/hypoglycemia symptoms:  Denies shakiness, lightheadedness, dizziness, racing heart, or other signs of low blood sugar  Medications:   -Orals: metformin 1000 mg BID   -injection: trulicity 1 94 weekly- will increase to 1 5 weekly   -Insulin: none   -ACEi or ARB: lisinopril 5 mg daily   -Statin: atorvastatin 80 mg nightly  Neuropathy:   -Symptoms: denies    -Last foot exam: today 10/25/2022  Diet:   -Low carb diet: reports cut out bread  Minimal sweets and junk food  Reports uses sweet and low for coffee  Drinks mostly water  Occasional 3 cans a week of diet soda  Will cut down     -Counting carbs: none  Exercise:   -Designated exercise plan: no exercise     Ophthalmology:   -Last visit: was seen two years ago  Podiatry:   -Last visit: no  Immunizations:   -Pneumovax:    -Prevnar: Due at 73  Tobacco use:   - former smoker- quit   - Interest in quitting? N/A    No results found for: Madeline Iniguez Results   Component Value Date    HGBA1C 11 8 (H) 09/06/2022       Glucose   Date Value Ref Range Status   01/08/2014 113 65 - 140 mg/dL Final     Comment:     If patient is fasting, the ADA then defines impaired fasting glucose as  >100 mg/dl and diabetes as  >or equal to 126 mg/dl         Creatinine   Date Value Ref Range Status   01/18/2022 0 77 0 60 - 1 30 mg/dL Final     Comment:     Standardized to IDMS reference method   01/08/2014 0 72 0 60 - 1 30 mg/dL Final     Comment:     Standardized to IDMS reference method     Calcium   Date Value Ref Range Status   01/18/2022 9 4 8 3 - 10 1 mg/dL Final   01/08/2014 9 3 8 3 - 10 1 mg/dL Final     Sodium   Date Value Ref Range Status   01/08/2014 138 135 - 146 mmol/L Final     Potassium   Date Value Ref Range Status   01/18/2022 4 1 3 5 - 5 3 mmol/L Final   01/08/2014 4 5 3 5 - 5 3 mmol/L Final     Chloride   Date Value Ref Range Status 01/18/2022 104 100 - 108 mmol/L Final   01/08/2014 97 (L) 98 - 108 mmol/L Final     CO2   Date Value Ref Range Status   01/18/2022 21 21 - 32 mmol/L Final   01/08/2014 26 0 21 0 - 32 0 mmol/L Final     Anion Gap   Date Value Ref Range Status   01/08/2014 15 (H) 4 - 13 mmol/L Final       Review of Systems      Objective:    Vitals:    10/25/22 1559   BP: 128/80   Pulse: 70   Temp: 98 5 °F (36 9 °C)   SpO2: 99%   Weight: (!) 140 kg (309 lb)   Height: 5' 4" (1 626 m)         Physical Exam  Vitals and nursing note reviewed  Constitutional:       General: He is not in acute distress  Appearance: Normal appearance  He is obese  He is not ill-appearing or toxic-appearing  HENT:      Head: Normocephalic and atraumatic  Nose: Nose normal       Mouth/Throat:      Mouth: Mucous membranes are moist       Pharynx: Oropharynx is clear  No oropharyngeal exudate or posterior oropharyngeal erythema  Eyes:      General: No scleral icterus  Extraocular Movements: Extraocular movements intact  Conjunctiva/sclera: Conjunctivae normal       Pupils: Pupils are equal, round, and reactive to light  Cardiovascular:      Rate and Rhythm: Normal rate and regular rhythm  Pulses: no weak pulses          Dorsalis pedis pulses are 2+ on the right side and 2+ on the left side  Posterior tibial pulses are 2+ on the right side and 2+ on the left side  Heart sounds: Normal heart sounds  No murmur heard  No friction rub  No gallop  Pulmonary:      Effort: Pulmonary effort is normal  No respiratory distress  Breath sounds: Normal breath sounds  No wheezing or rales  Feet:      Right foot:      Skin integrity: Erythema and dry skin present  No ulcer, skin breakdown, warmth or callus  Left foot:      Skin integrity: Erythema and dry skin present  No ulcer, skin breakdown, warmth or callus  Neurological:      Mental Status: He is alert           Diabetic Foot Exam    Patient's shoes and socks removed  Right Foot/Ankle   Right Foot Inspection  Skin Exam: skin normal, skin intact, dry skin and erythema  No warmth, no callus, no maceration, no abnormal color, no pre-ulcer, no ulcer and no callus  Toe Exam: right toe deformity (pes planus)  No swelling and no tenderness    Sensory   Monofilament testing: intact    Vascular  The right DP pulse is 2+  The right PT pulse is 2+  Left Foot/Ankle  Left Foot Inspection  Skin Exam: skin normal, skin intact, dry skin and erythema  No warmth, no maceration, normal color, no pre-ulcer, no ulcer and no callus  Toe Exam: left toe deformity (pes planus)  No swelling and no tenderness  Sensory   Monofilament testing: intact    Vascular  The left DP pulse is 2+  The left PT pulse is 2+       Assign Risk Category  No deformity present  No loss of protective sensation  No weak pulses  Risk: 0    PHQ-9 Depression Screening    Little interest or pleasure in doing things: 0 - not at all  Feeling down, depressed, or hopeless: 0 - not at all

## 2022-10-25 NOTE — PATIENT INSTRUCTIONS

## 2022-10-26 RX ORDER — NYSTATIN 100000 [USP'U]/G
POWDER TOPICAL 2 TIMES DAILY
Qty: 15 G | Refills: 0 | Status: SHIPPED | OUTPATIENT
Start: 2022-10-26 | End: 2022-11-25

## 2022-11-10 DIAGNOSIS — G89.4 CHRONIC PAIN SYNDROME: ICD-10-CM

## 2022-11-10 RX ORDER — GABAPENTIN 300 MG/1
CAPSULE ORAL
Qty: 90 CAPSULE | Refills: 2 | Status: SHIPPED | OUTPATIENT
Start: 2022-11-10

## 2022-11-21 DIAGNOSIS — K21.9 GASTROESOPHAGEAL REFLUX DISEASE, UNSPECIFIED WHETHER ESOPHAGITIS PRESENT: ICD-10-CM

## 2022-11-21 RX ORDER — FAMOTIDINE 20 MG/1
TABLET, FILM COATED ORAL
Qty: 30 TABLET | Refills: 0 | Status: SHIPPED | OUTPATIENT
Start: 2022-11-21

## 2022-11-23 ENCOUNTER — OFFICE VISIT (OUTPATIENT)
Dept: FAMILY MEDICINE CLINIC | Facility: CLINIC | Age: 62
End: 2022-11-23

## 2022-11-23 VITALS
WEIGHT: 306 LBS | HEART RATE: 92 BPM | DIASTOLIC BLOOD PRESSURE: 70 MMHG | HEIGHT: 64 IN | SYSTOLIC BLOOD PRESSURE: 126 MMHG | BODY MASS INDEX: 52.24 KG/M2 | TEMPERATURE: 98.2 F | OXYGEN SATURATION: 98 %

## 2022-11-23 DIAGNOSIS — Z12.5 PROSTATE CANCER SCREENING: ICD-10-CM

## 2022-11-23 DIAGNOSIS — Z23 ENCOUNTER FOR IMMUNIZATION: ICD-10-CM

## 2022-11-23 DIAGNOSIS — Z00.00 ANNUAL PHYSICAL EXAM: Primary | ICD-10-CM

## 2022-11-23 DIAGNOSIS — Z12.2 ENCOUNTER FOR SCREENING FOR LUNG CANCER: ICD-10-CM

## 2022-11-23 DIAGNOSIS — F17.211 NICOTINE DEPENDENCE, CIGARETTES, IN REMISSION: ICD-10-CM

## 2022-11-23 DIAGNOSIS — Z13.5 SCREENING FOR DIABETIC RETINOPATHY: ICD-10-CM

## 2022-11-23 DIAGNOSIS — E11.40 TYPE 2 DIABETES MELLITUS WITH DIABETIC NEUROPATHY, WITHOUT LONG-TERM CURRENT USE OF INSULIN (HCC): ICD-10-CM

## 2022-11-23 LAB
LEFT EYE DIABETIC RETINOPATHY: NORMAL
LEFT EYE IMAGE QUALITY: NORMAL
LEFT EYE MACULAR EDEMA: NORMAL
LEFT EYE OTHER RETINOPATHY: NORMAL
RIGHT EYE DIABETIC RETINOPATHY: NORMAL
RIGHT EYE IMAGE QUALITY: NORMAL
RIGHT EYE MACULAR EDEMA: NORMAL
RIGHT EYE OTHER RETINOPATHY: NORMAL
SEVERITY (EYE EXAM): NORMAL

## 2022-11-23 NOTE — PROGRESS NOTES
[address] [facility]    NAME: Jailene Rosario  AGE: 65 y o  SEX: male  : 1960     DATE: 2022     Assessment and Plan:     Tammy Ling was seen today for physical exam     Diagnoses and all orders for this visit:    Annual physical exam    Encounter for immunization  -     Pneumococcal Conjugate Vaccine 20-valent (PCV20)    Encounter for screening for lung cancer  -     CT lung screening program; Future    Nicotine dependence, cigarettes, in remission  -     CT lung screening program; Future    Screening for diabetic retinopathy  -     IRIS Diabetic eye exam    Prostate cancer screening  -     PSA, Total Screen; Future    Type 2 diabetes mellitus with diabetic neuropathy, without long-term current use of insulin (HCC)  Comments:  refill 1 5 mg weekly x 4 weeks  no change given some low readings  plan to repeat hba1c prior to next visit  - titrate as needed next visit  - hba1c in 3 months  Orders:  -     Dulaglutide 1 5 MG/0 5ML SOPN; Inject 0 5 mL (1 5 mg total) under the skin once a week for 4 doses        Diabetes screenin-70 who are overweight or obese   Lab Results   Component Value Date    HGBA1C 11 8 (H) 2022         Abdominal Aortic Aneurysm Screenin-76 who have ever smoked  reports that he quit smoking about 12 years ago  His smoking use included cigarettes  He has a 60 00 pack-year smoking history  He has quit using smokeless tobacco    Results: Not due yet    Cancer screening:  Colon Cancer screenin-66 years old   Family history of colon cancer? denies   Last colonoscopy:  No record on file but does not want to do it  Reports many reasons for not wanting to  Offered other modalities and reports we are doing well I should not push it  Prostate Cancer screenin-79 years old and understands the risks of overdiagnosis, overtreatment and treatment complications such as incontinence and ED      No components found for: PSASC is amenable to testing      Patient DOES (60 pack yr, quit 12 years ago)  meet criteria for lung cancer screening: People 49-80 years old with ? 20 pack year history of smoking and smoking cessation < 15 years ago  Amenable to testing    Immunizations and preventive care screenings were discussed with patient today  Appropriate education was printed on patient's after visit summary  Discussed risks and benefits of prostate cancer screening  We discussed the controversial history of PSA screening for prostate cancer in the [place] as well as the risk of over detection and over treatment of prostate cancer by way of PSA screening  The patient understands that PSA blood testing is an imperfect way to screen for prostate cancer and that elevated PSA levels in the blood may also be caused by infection, inflammation, prostatic trauma or manipulation, urological procedures, or by benign prostatic enlargement  The role of the digital rectal examination in prostate cancer screening was also discussed and I discussed with him that there is large interobserver variability in the findings of digital rectal examination  Counseling:  Alcohol/drug use: discussed moderation in alcohol intake, the recommendations for healthy alcohol use, and avoidance of illicit drug use  Dental Health: discussed importance of regular tooth brushing, flossing, and dental visits  Injury prevention: discussed safety/seat belts, safety helmets, smoke detectors, carbon dioxide detectors, and smoking near bedding or upholstery  Sexual health: discussed sexually transmitted diseases, partner selection, use of condoms, avoidance of unintended pregnancy, and contraceptive alternatives  · Exercise: the importance of regular exercise/physical activity was discussed  Recommend exercise 3-5 times per week for at least 30 minutes  Return in 6 weeks (on 1/4/2023) for Diabetes       Chief Complaint:     Chief Complaint   Patient presents with   • Physical Exam     Annual       History of Present Illness:     Adult Annual Physical   Patient here for a comprehensive physical exam  The patient reports wondering if he can increase his trulicity level  Reports took 1 5 mg once weekly took last one on the 11/20  Wants refill  Brought log and they are wide range from   Reports no symptoms with the 60  Reports did not do anything for treatment but reports could eat a candy bar  Pending repeat hba1c prior to next visit  Diet and Physical Activity  · Diet/Nutrition: limited junk food, low carb diet and consuming 3-5 servings of fruits/vegetables daily  · Exercise: no formal exercise  Depression Screening  PHQ-2/9 Depression Screening    Little interest or pleasure in doing things: 0 - not at all  Feeling down, depressed, or hopeless: 0 - not at all  PHQ-2 Score: 0  PHQ-2 Interpretation: Negative depression screen       General Health  · Sleep: gets more than 8 hours of sleep on average  · Hearing: normal - bilateral   · Vision: no vision problems  · Dental: regular dental visits  Only has three left- two on top and 1 on bottom- might get dentures     Health  · Symptoms include: none      Review of Systems:     Review of Systems      Past Medical History:     Past Medical History:   Diagnosis Date   • CAD (coronary artery disease)     s/p CABG x 2 LIMA-LAD, VG- OM1 8/5/2013   • Carotid duplex 08/05/2013    20-49% bilateral stenosis   • Diabetes ([facility] [address] )    • History of echocardiogram 12/22/2016    EF 55% Mild LVH   Mild MR   • Hyperlipidemia    • Hypertension       Past Surgical History:     Past Surgical History:   Procedure Laterality Date   • CARDIAC CATHETERIZATION  08/02/2013    EF 50% Severe left main disease 90%, OM1 99%, 100% RCA (bypass scheduled)   • CORONARY ARTERY BYPASS GRAFT  08/05/2013    CABG X2 LIMA-LAD, VG-OM1   • WOUND DEBRIDEMENT Right 8/12/2020    Procedure: EXCISIONAL DEBRIDEMENT Right pannus/groin;  Surgeon: Juliet Barraza MD;  Location: [facility];  Service: Trauma   • WOUND DEBRIDEMENT Right 2020    Procedure: EXCISIONAL DEBRIDEMENT, placement of drain and closure of wound;  Surgeon: Mary Nova DO;  Location: [facility];  Service: General      Family History:     Family History   Problem Relation Age of Onset   • Diabetes Mother    • Heart disease Mother    • Stroke Mother    • Obesity Mother    • Diabetes Brother    • Heart disease Brother    • Obesity Brother    • Cancer Neg Hx       Social History:     Social History     Socioeconomic History   • Marital status: /Civil Union     Spouse name: None   • Number of children: None   • Years of education: None   • Highest education level: None   Occupational History   • None   Tobacco Use   • Smoking status: Former     Packs/day: 2 00     Years: 30 00     Pack years: 60 00     Types: Cigarettes     Quit date:      Years since quittin 9   • Smokeless tobacco: Former   Vaping Use   • Vaping Use: Never used   Substance and Sexual Activity   • Alcohol use: Not Currently   • Drug use: Not Currently   • Sexual activity: None   Other Topics Concern   • None   Social History Narrative   • None     Social Determinants of Health     Financial Resource Strain: Not on file   Food Insecurity: Not on file   Transportation Needs: Not on file   Physical Activity: Not on file   Stress: Not on file   Social Connections: Not on file   Intimate Partner Violence: Not on file   Housing Stability: Not on file      Current Medications:     Current Outpatient Medications   Medication Sig Dispense Refill   • aspirin 81 MG tablet Take 1 tablet (81 mg total) by mouth daily     • atorvastatin (LIPITOR) 80 mg tablet Take 1 tablet (80 mg total) by mouth daily 90 tablet 1   • Blood Glucose Monitoring Suppl (OneTouch Verio Reflect) w/Device KIT Check blood sugars twice daily   Please substitute with appropriate alternative as covered by patient's insurance  Dx: E11 65 1 kit 0   • cyclobenzaprine (FLEXERIL) 10 mg tablet Take 1 tablet (10 mg total) by mouth 3 (three) times a day as needed for muscle spasms 30 tablet 0   • Dulaglutide 1 5 MG/0 5ML SOPN Inject 0 5 mL (1 5 mg total) under the skin once a week for 4 doses 2 mL 0   • famotidine (PEPCID) 20 mg tablet take 1 tablet by mouth once daily for HEARTBURN 30 tablet 0   • gabapentin (NEURONTIN) 300 mg capsule take 1 capsule by mouth three times a day 90 capsule 2   • glucose blood (OneTouch Verio) test strip Check blood sugars twice daily  Please substitute with appropriate alternative as covered by patient's insurance  Dx: E11 65 200 each 3   • glucose monitoring kit (FREESTYLE) monitoring kit Use 1 each 2 (two) times a day 1 each 0   • HYDROcodone-acetaminophen (NORCO) 7 5-325 mg per tablet Take 1 tablet by mouth daily at bedtime as needed for pain Do not fill until 11/11/2022 Max Daily Amount: 1 tablet 30 tablet 0   • HYDROcodone-acetaminophen (NORCO) 7 5-325 mg per tablet Take 1 tablet by mouth daily at bedtime as needed for pain Do not fill until 10/14/2022 Max Daily Amount: 1 tablet 30 tablet 0   • ibuprofen (MOTRIN) 800 mg tablet Take 1 tablet (800 mg total) by mouth every 8 (eight) hours as needed for mild pain, moderate pain, fever or headaches 40 tablet 0   • lisinopril (ZESTRIL) 5 mg tablet Take 1 tablet (5 mg total) by mouth daily 90 tablet 1   • metFORMIN (GLUCOPHAGE) 1000 MG tablet Take 1,000 mg by mouth 2 (two) times a day with meals     • metoprolol tartrate (LOPRESSOR) 50 mg tablet Take 50 mg by mouth every 12 (twelve) hours     • nitroglycerin (NITROSTAT) 0 4 mg SL tablet Place 1 tablet under the tongue as needed     • nystatin (MYCOSTATIN) powder Apply topically 2 (two) times a day 15 g 0   • OneTouch Delica Lancets 62H MISC Check blood sugars twice daily  Please substitute with appropriate alternative as covered by patient's insurance   Dx: E11 65 200 each 3     No current facility-administered medications for this visit  Allergies:     No Known Allergies   Physical Exam:     /70   Pulse 92   Temp 98 2 °F (36 8 °C)   Ht 5' 4" (1 626 m)   Wt (!) 139 kg (306 lb)   SpO2 98%   BMI 52 52 kg/m²     Physical Exam  Vitals and nursing note reviewed  Constitutional:       General: He is not in acute distress  Appearance: Normal appearance  He is well-developed  He is obese  He is not ill-appearing, toxic-appearing or diaphoretic  HENT:      Head: Normocephalic and atraumatic  Right Ear: Tympanic membrane, ear canal and external ear normal       Left Ear: Tympanic membrane, ear canal and external ear normal       Nose: Nose normal       Mouth/Throat:      Mouth: Mucous membranes are moist       Pharynx: No oropharyngeal exudate or posterior oropharyngeal erythema  Eyes:      Extraocular Movements: Extraocular movements intact  Conjunctiva/sclera: Conjunctivae normal       Pupils: Pupils are equal, round, and reactive to light  Cardiovascular:      Rate and Rhythm: Normal rate and regular rhythm  Pulses: Normal pulses  Heart sounds: Normal heart sounds  No murmur heard  No friction rub  No gallop  Pulmonary:      Effort: Pulmonary effort is normal  No respiratory distress  Breath sounds: Normal breath sounds  No wheezing or rales  Abdominal:      General: Bowel sounds are normal       Palpations: Abdomen is soft  Tenderness: There is no abdominal tenderness  Musculoskeletal:         General: No tenderness or deformity  Cervical back: Neck supple  Right lower leg: No edema  Left lower leg: No edema  Lymphadenopathy:      Cervical: No cervical adenopathy  Skin:     General: Skin is warm and dry  Neurological:      General: No focal deficit present  Mental Status: He is alert  Cranial Nerves: No cranial nerve deficit  Sensory: No sensory deficit  Motor: No weakness        Deep Tendon Reflexes: Reflexes normal       Comments: Weakness with flexion of hip and extension of knee on left          Alvin Aguilera MD  [id number] [facility]

## 2022-11-23 NOTE — PATIENT INSTRUCTIONS
Wellness Visit for Adults   AMBULATORY CARE:   A wellness visit  is when you see your healthcare provider to get screened for health problems  Your healthcare provider will also give you advice on how to stay healthy  Write down your questions so you remember to ask them  Ask your healthcare provider how often you should have a wellness visit  What happens at a wellness visit:  Your healthcare provider will ask about your health, and your family history of health problems  This includes high blood pressure, heart disease, and cancer  He or she will ask if you have symptoms that concern you, if you smoke, and about your mood  You may also be asked about your intake of medicines, supplements, food, and alcohol  Any of the following may be done: Your weight  will be checked  Your height may also be checked so your body mass index (BMI) can be calculated  Your BMI shows if you are at a healthy weight  Your blood pressure  and heart rate will be checked  Your temperature may also be checked  Blood and urine tests  may be done  Blood tests may be done to check your cholesterol levels  Abnormal cholesterol levels increase your risk for heart disease and stroke  You may also need a blood or urine test to check for diabetes if you are at increased risk  Urine tests may be done to look for signs of an infection or kidney disease  A physical exam  includes checking your heartbeat and lungs with a stethoscope  Your healthcare provider may also check your skin to look for sun damage  Screening tests  may be recommended  A screening test is done to check for diseases that may not cause symptoms  The screening tests you may need depend on your age, gender, family history, and lifestyle habits  For example, colorectal screening may be recommended if you are 48years old or older  Screening tests you need if you are a woman:   A Pap smear  is used to screen for cervical cancer   Pap smears are usually done every 3 to 5 years depending on your age  You may need them more often if you have had abnormal Pap smear test results in the past  Ask your healthcare provider how often you should have a Pap smear  A mammogram  is an x-ray of your breasts to screen for breast cancer  Experts recommend mammograms every 2 years starting at age 48 years  You may need a mammogram at age 52 years or younger if you have an increased risk for breast cancer  Talk to your healthcare provider about when you should start having mammograms and how often you need them  Vaccines you may need:   Get an influenza vaccine  every year  The influenza vaccine protects you from the flu  Several types of viruses cause the flu  The viruses change over time, so new vaccines are made each year  Get a tetanus-diphtheria (Td) booster vaccine  every 10 years  This vaccine protects you against tetanus and diphtheria  Tetanus is a severe infection that may cause painful muscle spasms and lockjaw  Diphtheria is a severe bacterial infection that causes a thick covering in the back of your mouth and throat  Get a human papillomavirus (HPV) vaccine  if you are female and aged 23 to 32 or male 23 to 24 and never received it  This vaccine protects you from HPV infection  HPV is the most common infection spread by sexual contact  HPV may also cause vaginal, penile, and anal cancers  Get a pneumococcal vaccine  if you are aged 72 years or older  The pneumococcal vaccine is an injection given to protect you from pneumococcal disease  Pneumococcal disease is an infection caused by pneumococcal bacteria  The infection may cause pneumonia, meningitis, or an ear infection  Get a shingles vaccine  if you are 60 or older, even if you have had shingles before  The shingles vaccine is an injection to protect you from the varicella-zoster virus  This is the same virus that causes chickenpox   Shingles is a painful rash that develops in people who had chickenpox or have been exposed to the virus  How to eat healthy:  My Plate is a model for planning healthy meals  It shows the types and amounts of foods that should go on your plate  Fruits and vegetables make up about half of your plate, and grains and protein make up the other half  A serving of dairy is included on the side of your plate  The amount of calories and serving sizes you need depends on your age, gender, weight, and height  Examples of healthy foods are listed below:  Eat a variety of vegetables  such as dark green, red, and orange vegetables  You can also include canned vegetables low in sodium (salt) and frozen vegetables without added butter or sauces  Eat a variety of fresh fruits , canned fruit in 100% juice, frozen fruit, and dried fruit  Include whole grains  At least half of the grains you eat should be whole grains  Examples include whole-wheat bread, wheat pasta, brown rice, and whole-grain cereals such as oatmeal     Eat a variety of protein foods such as seafood (fish and shellfish), lean meat, and poultry without skin (turkey and chicken)  Examples of lean meats include pork leg, shoulder, or tenderloin, and beef round, sirloin, tenderloin, and extra lean ground beef  Other protein foods include eggs and egg substitutes, beans, peas, soy products, nuts, and seeds  Choose low-fat dairy products such as skim or 1% milk or low-fat yogurt, cheese, and cottage cheese  Limit unhealthy fats  such as butter, hard margarine, and shortening  Exercise:  Exercise at least 30 minutes per day on most days of the week  Some examples of exercise include walking, biking, dancing, and swimming  You can also fit in more physical activity by taking the stairs instead of the elevator or parking farther away from stores  Include muscle strengthening activities 2 days each week  Regular exercise provides many health benefits   It helps you manage your weight, and decreases your risk for type 2 diabetes, heart disease, stroke, and high blood pressure  Exercise can also help improve your mood  Ask your healthcare provider about the best exercise plan for you  General health and safety guidelines:   Do not smoke  Nicotine and other chemicals in cigarettes and cigars can cause lung damage  Ask your healthcare provider for information if you currently smoke and need help to quit  E-cigarettes or smokeless tobacco still contain nicotine  Talk to your healthcare provider before you use these products  Limit alcohol  A drink of alcohol is 12 ounces of beer, 5 ounces of wine, or 1½ ounces of liquor  Lose weight, if needed  Being overweight increases your risk of certain health conditions  These include heart disease, high blood pressure, type 2 diabetes, and certain types of cancer  Protect your skin  Do not sunbathe or use tanning beds  Use sunscreen with a SPF 15 or higher  Apply sunscreen at least 15 minutes before you go outside  Reapply sunscreen every 2 hours  Wear protective clothing, hats, and sunglasses when you are outside  Drive safely  Always wear your seatbelt  Make sure everyone in your car wears a seatbelt  A seatbelt can save your life if you are in an accident  Do not use your cell phone when you are driving  This could distract you and cause an accident  Pull over if you need to make a call or send a text message  Practice safe sex  Use latex condoms if are sexually active and have more than one partner  Your healthcare provider may recommend screening tests for sexually transmitted infections (STIs)  Wear helmets, lifejackets, and protective gear  Always wear a helmet when you ride a bike or motorcycle, go skiing, or play sports that could cause a head injury  Wear protective equipment when you play sports  Wear a lifejacket when you are on a boat or doing water sports      © Copyright [employer] 2022 Information is for End User's use only and may not be sold, redistributed or otherwise used for commercial purposes  All illustrations and images included in CareNotes® are the copyrighted property of [employer]  or Giancarlo Cardona  The above information is an  only  It is not intended as medical advice for individual conditions or treatments  Talk to your doctor, nurse or pharmacist before following any medical regimen to see if it is safe and effective for you

## 2022-11-28 ENCOUNTER — APPOINTMENT (EMERGENCY)
Dept: MRI IMAGING | Facility: HOSPITAL | Age: 62
End: 2022-11-28

## 2022-11-28 ENCOUNTER — HOSPITAL ENCOUNTER (EMERGENCY)
Facility: HOSPITAL | Age: 62
Discharge: HOME | End: 2022-11-28
Attending: EMERGENCY MEDICINE

## 2022-11-28 VITALS
RESPIRATION RATE: 16 BRPM | TEMPERATURE: 99.5 F | SYSTOLIC BLOOD PRESSURE: 147 MMHG | WEIGHT: 306 LBS | HEART RATE: 96 BPM | BODY MASS INDEX: 52.52 KG/M2 | DIASTOLIC BLOOD PRESSURE: 75 MMHG | OXYGEN SATURATION: 97 %

## 2022-11-28 DIAGNOSIS — N12 PYELONEPHRITIS: Primary | ICD-10-CM

## 2022-11-28 LAB
ALBUMIN SERPL BCP-MCNC: 4 G/DL (ref 3.5–5)
ALP SERPL-CCNC: 71 U/L (ref 34–104)
ALT SERPL W P-5'-P-CCNC: 19 U/L (ref 7–52)
ANION GAP SERPL CALCULATED.3IONS-SCNC: 12 MMOL/L (ref 4–13)
APTT PPP: 31 SECONDS (ref 23–37)
AST SERPL W P-5'-P-CCNC: 14 U/L (ref 13–39)
ATRIAL RATE: 100 BPM
BACTERIA UR QL AUTO: ABNORMAL /HPF
BASOPHILS # BLD AUTO: 0.07 THOUSANDS/ÂΜL (ref 0–0.1)
BASOPHILS NFR BLD AUTO: 1 % (ref 0–1)
BILIRUB SERPL-MCNC: 1.53 MG/DL (ref 0.2–1)
BILIRUB UR QL STRIP: NEGATIVE
BUN SERPL-MCNC: 12 MG/DL (ref 5–25)
CALCIUM SERPL-MCNC: 10 MG/DL (ref 8.4–10.2)
CHLORIDE SERPL-SCNC: 100 MMOL/L (ref 96–108)
CLARITY UR: CLEAR
CO2 SERPL-SCNC: 20 MMOL/L (ref 21–32)
COLOR UR: YELLOW
CREAT SERPL-MCNC: 0.68 MG/DL (ref 0.6–1.3)
EOSINOPHIL # BLD AUTO: 0.01 THOUSAND/ÂΜL (ref 0–0.61)
EOSINOPHIL NFR BLD AUTO: 0 % (ref 0–6)
ERYTHROCYTE [DISTWIDTH] IN BLOOD BY AUTOMATED COUNT: 13.2 % (ref 11.6–15.1)
GFR SERPL CREATININE-BSD FRML MDRD: 103 ML/MIN/1.73SQ M
GLUCOSE SERPL-MCNC: 192 MG/DL (ref 65–140)
GLUCOSE UR STRIP-MCNC: NEGATIVE MG/DL
HCT VFR BLD AUTO: 39 % (ref 36.5–49.3)
HGB BLD-MCNC: 12.9 G/DL (ref 12–17)
HGB UR QL STRIP.AUTO: ABNORMAL
IMM GRANULOCYTES # BLD AUTO: 0.07 THOUSAND/UL (ref 0–0.2)
IMM GRANULOCYTES NFR BLD AUTO: 1 % (ref 0–2)
INR PPP: 1.22 (ref 0.84–1.19)
KETONES UR STRIP-MCNC: NEGATIVE MG/DL
LACTATE SERPL-SCNC: 1.8 MMOL/L (ref 0.5–2)
LEUKOCYTE ESTERASE UR QL STRIP: NEGATIVE
LYMPHOCYTES # BLD AUTO: 1.01 THOUSANDS/ÂΜL (ref 0.6–4.47)
LYMPHOCYTES NFR BLD AUTO: 7 % (ref 14–44)
MCH RBC QN AUTO: 29.2 PG (ref 26.8–34.3)
MCHC RBC AUTO-ENTMCNC: 33.1 G/DL (ref 31.4–37.4)
MCV RBC AUTO: 88 FL (ref 82–98)
MONOCYTES # BLD AUTO: 0.87 THOUSAND/ÂΜL (ref 0.17–1.22)
MONOCYTES NFR BLD AUTO: 6 % (ref 4–12)
NEUTROPHILS # BLD AUTO: 13.39 THOUSANDS/ÂΜL (ref 1.85–7.62)
NEUTS SEG NFR BLD AUTO: 85 % (ref 43–75)
NITRITE UR QL STRIP: POSITIVE
NON-SQ EPI CELLS URNS QL MICRO: ABNORMAL /HPF
NRBC BLD AUTO-RTO: 0 /100 WBCS
P AXIS: 53 DEGREES
PH UR STRIP.AUTO: 6 [PH]
PLATELET # BLD AUTO: 269 THOUSANDS/UL (ref 149–390)
PMV BLD AUTO: 10.2 FL (ref 8.9–12.7)
POTASSIUM SERPL-SCNC: 3.9 MMOL/L (ref 3.5–5.3)
PR INTERVAL: 170 MS
PROCALCITONIN SERPL-MCNC: 1.88 NG/ML
PROT SERPL-MCNC: 7.5 G/DL (ref 6.4–8.4)
PROT UR STRIP-MCNC: ABNORMAL MG/DL
PROTHROMBIN TIME: 15.4 SECONDS (ref 11.6–14.5)
QRS AXIS: 12 DEGREES
QRSD INTERVAL: 80 MS
QT INTERVAL: 326 MS
QTC INTERVAL: 420 MS
RBC # BLD AUTO: 4.42 MILLION/UL (ref 3.88–5.62)
RBC #/AREA URNS AUTO: ABNORMAL /HPF
SODIUM SERPL-SCNC: 132 MMOL/L (ref 135–147)
SP GR UR STRIP.AUTO: >=1.03
T WAVE AXIS: 48 DEGREES
UROBILINOGEN UR QL STRIP.AUTO: 0.2 E.U./DL
VENTRICULAR RATE: 100 BPM
WBC # BLD AUTO: 15.42 THOUSAND/UL (ref 4.31–10.16)
WBC #/AREA URNS AUTO: ABNORMAL /HPF

## 2022-11-28 RX ORDER — SULFAMETHOXAZOLE AND TRIMETHOPRIM 800; 160 MG/1; MG/1
1 TABLET ORAL 2 TIMES DAILY
Qty: 20 TABLET | Refills: 0 | Status: SHIPPED | OUTPATIENT
Start: 2022-11-28 | End: 2022-12-08

## 2022-11-28 RX ORDER — CEFTRIAXONE 1 G/50ML
1000 INJECTION, SOLUTION INTRAVENOUS ONCE
Status: COMPLETED | OUTPATIENT
Start: 2022-11-28 | End: 2022-11-28

## 2022-11-28 RX ORDER — KETOROLAC TROMETHAMINE 30 MG/ML
15 INJECTION, SOLUTION INTRAMUSCULAR; INTRAVENOUS ONCE
Status: COMPLETED | OUTPATIENT
Start: 2022-11-28 | End: 2022-11-28

## 2022-11-28 RX ORDER — CEFPODOXIME PROXETIL 200 MG/1
200 TABLET, FILM COATED ORAL 2 TIMES DAILY
Qty: 18 TABLET | Refills: 0 | Status: SHIPPED | OUTPATIENT
Start: 2022-11-29 | End: 2022-11-28

## 2022-11-28 RX ADMIN — CEFTRIAXONE 1000 MG: 1 INJECTION, SOLUTION INTRAVENOUS at 10:09

## 2022-11-28 RX ADMIN — SODIUM CHLORIDE 1000 ML: 0.9 INJECTION, SOLUTION INTRAVENOUS at 09:04

## 2022-11-28 RX ADMIN — KETOROLAC TROMETHAMINE 15 MG: 30 INJECTION, SOLUTION INTRAMUSCULAR at 07:56

## 2022-11-28 NOTE — ED PROVIDER NOTES
History  Chief Complaint   Patient presents with   • Leg Pain     Bilateral leg painj onset Saturday  Patient reports joint pain after pneumonia vaccine   • Urinary Incontinence     Onset yesterday     HPI    Prior to Admission Medications   Prescriptions Last Dose Informant Patient Reported? Taking? Blood Glucose Monitoring Suppl (OneTouch Verio Reflect) w/Device KIT   No No   Sig: Check blood sugars twice daily  Please substitute with appropriate alternative as covered by patient's insurance  Dx: E11 66   Dulaglutide 1 5 MG/0 5ML SOPN   No No   Sig: Inject 0 5 mL (1 5 mg total) under the skin once a week for 4 doses   HYDROcodone-acetaminophen (NORCO) 7 5-325 mg per tablet   No No   Sig: Take 1 tablet by mouth daily at bedtime as needed for pain Do not fill until 11/11/2022 Max Daily Amount: 1 tablet   HYDROcodone-acetaminophen (NORCO) 7 5-325 mg per tablet   No No   Sig: Take 1 tablet by mouth daily at bedtime as needed for pain Do not fill until 10/14/2022 Max Daily Amount: 1 tablet   OneTouch Delica Lancets 94C MISC   No No   Sig: Check blood sugars twice daily  Please substitute with appropriate alternative as covered by patient's insurance  Dx: E11 66   aspirin 81 MG tablet   No No   Sig: Take 1 tablet (81 mg total) by mouth daily   atorvastatin (LIPITOR) 80 mg tablet   No No   Sig: Take 1 tablet (80 mg total) by mouth daily   cyclobenzaprine (FLEXERIL) 10 mg tablet   No No   Sig: Take 1 tablet (10 mg total) by mouth 3 (three) times a day as needed for muscle spasms   famotidine (PEPCID) 20 mg tablet   No No   Sig: take 1 tablet by mouth once daily for HEARTBURN   gabapentin (NEURONTIN) 300 mg capsule   No No   Sig: take 1 capsule by mouth three times a day   glucose blood (OneTouch Verio) test strip   No No   Sig: Check blood sugars twice daily  Please substitute with appropriate alternative as covered by patient's insurance   Dx: E11 66   glucose monitoring kit (FREESTYLE) monitoring kit   No No   Sig: Use 1 each 2 (two) times a day   ibuprofen (MOTRIN) 800 mg tablet   No No   Sig: Take 1 tablet (800 mg total) by mouth every 8 (eight) hours as needed for mild pain, moderate pain, fever or headaches   lisinopril (ZESTRIL) 5 mg tablet   No No   Sig: Take 1 tablet (5 mg total) by mouth daily   metFORMIN (GLUCOPHAGE) 1000 MG tablet   Yes No   Sig: Take 1,000 mg by mouth 2 (two) times a day with meals   metoprolol tartrate (LOPRESSOR) 50 mg tablet   Yes No   Sig: Take 50 mg by mouth every 12 (twelve) hours   nitroglycerin (NITROSTAT) 0 4 mg SL tablet   Yes No   Sig: Place 1 tablet under the tongue as needed   nystatin (MYCOSTATIN) powder   No No   Sig: Apply topically 2 (two) times a day      Facility-Administered Medications: None       Past Medical History:   Diagnosis Date   • CAD (coronary artery disease)     s/p CABG x 2 LIMA-LAD, VG- OM1 8/5/2013   • Carotid duplex 08/05/2013    20-49% bilateral stenosis   • Diabetes ([facility] [address] )    • History of echocardiogram 12/22/2016    EF 55% Mild LVH   Mild MR   • Hyperlipidemia    • Hypertension        Past Surgical History:   Procedure Laterality Date   • CARDIAC CATHETERIZATION  08/02/2013    EF 50% Severe left main disease 90%, OM1 99%, 100% RCA (bypass scheduled)   • CORONARY ARTERY BYPASS GRAFT  08/05/2013    CABG X2 LIMA-LAD, VG-OM1   • WOUND DEBRIDEMENT Right 8/12/2020    Procedure: EXCISIONAL DEBRIDEMENT Right pannus/groin;  Surgeon: Gwendolyn Miranda MD;  Location: [facility];  Service: Trauma   • WOUND DEBRIDEMENT Right 8/13/2020    Procedure: EXCISIONAL DEBRIDEMENT, placement of drain and closure of wound;  Surgeon: Alexei Guerra DO;  Location: [facility];  Service: General       Family History   Problem Relation Age of Onset   • Diabetes Mother    • Heart disease Mother    • Stroke Mother    • Obesity Mother    • Diabetes Brother    • Heart disease Brother    • Obesity Brother    • Cancer Neg Hx      I have reviewed and agree with the history as documented      E-Cigarette/Vaping   • E-Cigarette Use Never User      E-Cigarette/Vaping Substances     Social History     Tobacco Use   • Smoking status: Former     Packs/day: 2 00     Years: 30 00     Pack years: 60 00     Types: Cigarettes     Quit date:      Years since quittin 9   • Smokeless tobacco: Former   Vaping Use   • Vaping Use: Never used   Substance Use Topics   • Alcohol use: Not Currently   • Drug use: Not Currently       Review of Systems    Physical Exam  Physical Exam    Vital Signs  ED Triage Vitals   Temperature Pulse Respirations Blood Pressure SpO2   22 0742 22 0742 22 0742 22 0742 22 0742   99 5 °F (37 5 °C) 105 16 (!) 191/86 98 %      Temp Source Heart Rate Source Patient Position - Orthostatic VS BP Location FiO2 (%)   22 0742 22 0742 22 0742 22 0742 --   Tympanic Monitor Sitting Left arm       Pain Score       22 0741       10 - Worst Possible Pain           Vitals:    22 0742 22 0915 22 1033 22 1044   BP: (!) 191/86 141/79  147/75   Pulse: 105 (!) 109 88 96   Patient Position - Orthostatic VS: Sitting Sitting  Sitting         Visual Acuity      ED Medications  Medications   ketorolac (TORADOL) injection 15 mg (15 mg Intravenous Given 22 0756)   sodium chloride 0 9 % bolus 1,000 mL (0 mL Intravenous Stopped 22 1044)   cefTRIAXone (ROCEPHIN) IVPB (premix in dextrose) 1,000 mg 50 mL (0 mg Intravenous Stopped 22 1043)       Diagnostic Studies  Results Reviewed     Procedure Component Value Units Date/Time    Urine Microscopic [[id number]]  (Abnormal) Collected: 22    Lab Status: Final result Specimen: Urine, Other Updated: 22 1000     RBC, UA 4-10 /hpf      WBC, UA 4-10 /hpf      Epithelial Cells Occasional /hpf      Bacteria, UA Occasional /hpf     UA w Reflex to Microscopic w Reflex to Culture [[id number]]  (Abnormal) Collected: 22    Lab Status: Final result Specimen: Urine, Other Updated: 11/28/22 0950     Color, UA Yellow     Clarity, UA Clear     Specific Gravity, UA >=1 030     pH, UA 6 0     Leukocytes, UA Negative     Nitrite, UA Positive     Protein, UA 2+ mg/dl      Glucose, UA Negative mg/dl      Ketones, UA Negative mg/dl      Urobilinogen, UA 0 2 E U /dl      Bilirubin, UA Negative     Occult Blood, UA 1+    Procalcitonin [[id number]]  (Abnormal) Collected: 11/28/22 0755    Lab Status: Final result Specimen: Blood from Arm, Right Updated: 11/28/22 0900     Procalcitonin 1 88 ng/ml     Protime-INR [[id number]]  (Abnormal) Collected: 11/28/22 0755    Lab Status: Final result Specimen: Blood from Arm, Right Updated: 11/28/22 0833     Protime 15 4 seconds      INR 1 22    APTT [[id number]]  (Normal) Collected: 11/28/22 0755    Lab Status: Final result Specimen: Blood from Arm, Right Updated: 11/28/22 0833     PTT 31 seconds     Comprehensive metabolic panel [[id number]]  (Abnormal) Collected: 11/28/22 0755    Lab Status: Final result Specimen: Blood from Arm, Right Updated: 11/28/22 0824     Sodium 132 mmol/L      Potassium 3 9 mmol/L      Chloride 100 mmol/L      CO2 20 mmol/L      ANION GAP 12 mmol/L      BUN 12 mg/dL      Creatinine 0 68 mg/dL      Glucose 192 mg/dL      Calcium 10 0 mg/dL      AST 14 U/L      ALT 19 U/L      Alkaline Phosphatase 71 U/L      Total Protein 7 5 g/dL      Albumin 4 0 g/dL      Total Bilirubin 1 53 mg/dL      eGFR 103 ml/min/1 73sq m     Narrative:      [facility] guidelines for Chronic Kidney Disease (CKD):   •  Stage 1 with normal or high GFR (GFR > 90 mL/min/1 73 square meters)  •  Stage 2 Mild CKD (GFR = 60-89 mL/min/1 73 square meters)  •  Stage 3A Moderate CKD (GFR = 45-59 mL/min/1 73 square meters)  •  Stage 3B Moderate CKD (GFR = 30-44 mL/min/1 73 square meters)  •  Stage 4 Severe CKD (GFR = 15-29 mL/min/1 73 square meters)  •  Stage 5 End Stage CKD (GFR <15 mL/min/1 73 square meters)  Note: GFR calculation is accurate only with a steady state creatinine    Lactic acid [[id number]]  (Normal) Collected: 11/28/22 0755    Lab Status: Final result Specimen: Blood from Arm, Right Updated: 11/28/22 8046     LACTIC ACID 1 8 mmol/L     Narrative:      Result may be elevated if tourniquet was used during collection  CBC and differential [[id number]]  (Abnormal) Collected: 11/28/22 0755    Lab Status: Final result Specimen: Blood from Arm, Right Updated: 11/28/22 0805     WBC 15 42 Thousand/uL      RBC 4 42 Million/uL      Hemoglobin 12 9 g/dL      Hematocrit 39 0 %      MCV 88 fL      MCH 29 2 pg      MCHC 33 1 g/dL      RDW 13 2 %      MPV 10 2 fL      Platelets 933 Thousands/uL      nRBC 0 /100 WBCs      Neutrophils Relative 85 %      Immat GRANS % 1 %      Lymphocytes Relative 7 %      Monocytes Relative 6 %      Eosinophils Relative 0 %      Basophils Relative 1 %      Neutrophils Absolute 13 39 Thousands/µL      Immature Grans Absolute 0 07 Thousand/uL      Lymphocytes Absolute 1 01 Thousands/µL      Monocytes Absolute 0 87 Thousand/µL      Eosinophils Absolute 0 01 Thousand/µL      Basophils Absolute 0 07 Thousands/µL     Blood culture #2 [[id number]] Collected: 11/28/22 0757    Lab Status: In process Specimen: Blood from Hand, Left Updated: 11/28/22 0802    Blood culture #1 [[id number]] Collected: 11/28/22 0755    Lab Status: In process Specimen: Blood from Arm, Right Updated: 11/28/22 0802                 MRI lumbar spine wo contrast   Final Result by Deni Valentin DO (11/28 3324)      Minimal noncompressive lumbar degenerative change at the L5-S1 level  Workstation performed: [id number]                    Procedures  Procedures         ED Course                               SBIRT 22yo+    Flowsheet Row Most Recent Value   SBIRT (25 yo +)    In order to provide better care to our patients, we are screening all of our patients for alcohol and drug use   Would it be okay to ask you these screening questions? Yes Filed at: 11/28/2022 3773   Initial Alcohol Screen: US AUDIT-C     1  How often do you have a drink containing alcohol? 0 Filed at: 11/28/2022 0807   2  How many drinks containing alcohol do you have on a typical day you are drinking? 0 Filed at: 11/28/2022 0807   3a  Male UNDER 65: How often do you have five or more drinks on one occasion? 0 Filed at: 11/28/2022 0807   3b  FEMALE Any Age, or MALE 65+: How often do you have 4 or more drinks on one occassion? 0 Filed at: 11/28/2022 0807   Audit-C Score 0 Filed at: 11/28/2022 7685   MALIKA: How many times in the past year have you    Used an illegal drug or used a prescription medication for non-medical reasons? Never Filed at: 11/28/2022 0807                    MDM    Disposition  Final diagnoses:   Pyelonephritis     Time reflects when diagnosis was documented in both MDM as applicable and the Disposition within this note     Time User Action Codes Description Comment    11/28/2022 10:33 AM Zuleyka Jones [N12] Pyelonephritis       ED Disposition     ED Disposition   Discharge    Condition   Stable    Date/Time   Mon Nov 28, 2022 1033    Farmerrt discharge to home/self care  Follow-up Information     Follow up With Specialties Details Why Contact Info Additional Information    [facility] Emergency Medicine  If symptoms worsen [address] [facility] [place] [address] [place] [address]  Mine Logan MD Family Medicine Schedule an appointment as soon as possible for a visit in 2 days For symptom recheck [address] [place] [address]  [phone]             Discharge Medication List as of 11/28/2022 10:35 AM      START taking these medications    Details   sulfamethoxazole-trimethoprim (BACTRIM DS) 800-160 mg per tablet Take 1 tablet by mouth 2 (two) times a day for 10 days smx-tmp DS (BACTRIM) 800-160 mg tabs (1tab q12 D10), Starting Mon 11/28/2022, Until Thu 12/8/2022, Normal         CONTINUE these medications which have NOT CHANGED    Details   aspirin 81 MG tablet Take 1 tablet (81 mg total) by mouth daily, Starting Tue 10/22/2019, No Print      atorvastatin (LIPITOR) 80 mg tablet Take 1 tablet (80 mg total) by mouth daily, Starting Tue 9/27/2022, Normal      Blood Glucose Monitoring Suppl (OneTouch Verio Reflect) w/Device KIT Check blood sugars twice daily  Please substitute with appropriate alternative as covered by patient's insurance  Dx: E11 65, Normal      cyclobenzaprine (FLEXERIL) 10 mg tablet Take 1 tablet (10 mg total) by mouth 3 (three) times a day as needed for muscle spasms, Starting Sun 8/2/2020, Normal      Dulaglutide 1 5 MG/0 5ML SOPN Inject 0 5 mL (1 5 mg total) under the skin once a week for 4 doses, Starting Wed 11/23/2022, Until Thu 12/15/2022, Normal      famotidine (PEPCID) 20 mg tablet take 1 tablet by mouth once daily for HEARTBURN, Normal      gabapentin (NEURONTIN) 300 mg capsule take 1 capsule by mouth three times a day, Normal      glucose blood (OneTouch Verio) test strip Check blood sugars twice daily  Please substitute with appropriate alternative as covered by patient's insurance   Dx: E11 65, Normal      glucose monitoring kit (FREESTYLE) monitoring kit Use 1 each 2 (two) times a day, Starting Tue 10/25/2022, Normal      !! HYDROcodone-acetaminophen (NORCO) 7 5-325 mg per tablet Take 1 tablet by mouth daily at bedtime as needed for pain Do not fill until 11/11/2022 Max Daily Amount: 1 tablet, Starting Fri 10/7/2022, Normal      !! HYDROcodone-acetaminophen (NORCO) 7 5-325 mg per tablet Take 1 tablet by mouth daily at bedtime as needed for pain Do not fill until 10/14/2022 Max Daily Amount: 1 tablet, Starting Fri 10/7/2022, Normal      ibuprofen (MOTRIN) 800 mg tablet Take 1 tablet (800 mg total) by mouth every 8 (eight) hours as needed for mild pain, moderate pain, fever or headaches, Starting Sun 8/2/2020, Normal      lisinopril (ZESTRIL) 5 mg tablet Take 1 tablet (5 mg total) by mouth daily, Starting Tue 9/27/2022, Normal      metFORMIN (GLUCOPHAGE) 1000 MG tablet Take 1,000 mg by mouth 2 (two) times a day with meals, Starting Tue 12/7/2021, Historical Med      metoprolol tartrate (LOPRESSOR) 50 mg tablet Take 50 mg by mouth every 12 (twelve) hours, Historical Med      nitroglycerin (NITROSTAT) 0 4 mg SL tablet Place 1 tablet under the tongue as needed, Historical Med      nystatin (MYCOSTATIN) powder Apply topically 2 (two) times a day, Starting Wed 10/26/2022, Until Fri 11/25/2022, Normal      OneTouch Delica Lancets 12E MISC Check blood sugars twice daily  Please substitute with appropriate alternative as covered by patient's insurance  Dx: E11 65, Normal       !! - Potential duplicate medications found  Please discuss with provider  No discharge procedures on file      PDMP Review       Value Time User    PDMP Reviewed  Yes 10/7/2022  2:26 PM Annmarie Coburn, [facility]           ED Provider  Electronically Signed by Normal      glucose blood (OneTouch Verio) test strip Check blood sugars twice daily  Please substitute with appropriate alternative as covered by patient's insurance  Dx: E11 65, Normal      glucose monitoring kit (FREESTYLE) monitoring kit Use 1 each 2 (two) times a day, Starting Tue 10/25/2022, Normal      ibuprofen (MOTRIN) 800 mg tablet Take 1 tablet (800 mg total) by mouth every 8 (eight) hours as needed for mild pain, moderate pain, fever or headaches, Starting Sun 8/2/2020, Normal      lisinopril (ZESTRIL) 5 mg tablet Take 1 tablet (5 mg total) by mouth daily, Starting Tue 9/27/2022, Normal      metFORMIN (GLUCOPHAGE) 1000 MG tablet Take 1,000 mg by mouth 2 (two) times a day with meals, Starting Tue 12/7/2021, Historical Med      metoprolol tartrate (LOPRESSOR) 50 mg tablet Take 50 mg by mouth every 12 (twelve) hours, Historical Med      nitroglycerin (NITROSTAT) 0 4 mg SL tablet Place 1 tablet under the tongue as needed, Historical Med      nystatin (MYCOSTATIN) powder Apply topically 2 (two) times a day, Starting Wed 10/26/2022, Until Fri 11/25/2022, Normal      OneTouch Delica Lancets 05Y MISC Check blood sugars twice daily  Please substitute with appropriate alternative as covered by patient's insurance  Dx: E11 65, Normal      !! HYDROcodone-acetaminophen (NORCO) 7 5-325 mg per tablet Take 1 tablet by mouth daily at bedtime as needed for pain Do not fill until 11/11/2022 Max Daily Amount: 1 tablet, Starting Fri 10/7/2022, Normal      !! HYDROcodone-acetaminophen (NORCO) 7 5-325 mg per tablet Take 1 tablet by mouth daily at bedtime as needed for pain Do not fill until 10/14/2022 Max Daily Amount: 1 tablet, Starting Fri 10/7/2022, Normal       !! - Potential duplicate medications found  Please discuss with provider  No discharge procedures on file      PDMP Review       Value Time User    PDMP Reviewed  Yes 12/7/2022  2:18 PM Gaye Robbins, [facility]          ED Provider  Electronically Signed by           Alisa Jefferson MD  12/07/22 7825

## 2022-11-28 NOTE — ED NOTES
Patient transported to [address] [place] [address] Hakeem Vaughan, [address] [place] [address]  11/28/22 9481

## 2022-11-28 NOTE — DISCHARGE INSTRUCTIONS
-take antibiotic starting tomorrow for kidney infection and use Tylenol for any pain    -if you develop persistent fevers or worsening back pain or any new worsening symptoms it is important you return directly to the emergency department

## 2022-11-29 ENCOUNTER — TELEPHONE (OUTPATIENT)
Dept: FAMILY MEDICINE CLINIC | Facility: CLINIC | Age: 62
End: 2022-11-29

## 2022-11-29 NOTE — TELEPHONE ENCOUNTER
Patient called and stated his insurance won't cover Trulicity  Is there any other medication that can be sent over in place of it?

## 2022-12-03 LAB
BACTERIA BLD CULT: NORMAL
BACTERIA BLD CULT: NORMAL

## 2022-12-07 ENCOUNTER — OFFICE VISIT (OUTPATIENT)
Dept: PAIN MEDICINE | Facility: CLINIC | Age: 62
End: 2022-12-07

## 2022-12-07 ENCOUNTER — OFFICE VISIT (OUTPATIENT)
Dept: PODIATRY | Facility: CLINIC | Age: 62
End: 2022-12-07

## 2022-12-07 ENCOUNTER — APPOINTMENT (OUTPATIENT)
Dept: LAB | Facility: CLINIC | Age: 62
End: 2022-12-07

## 2022-12-07 VITALS
HEIGHT: 64 IN | SYSTOLIC BLOOD PRESSURE: 140 MMHG | WEIGHT: 306 LBS | OXYGEN SATURATION: 98 % | DIASTOLIC BLOOD PRESSURE: 76 MMHG | BODY MASS INDEX: 52.24 KG/M2 | HEART RATE: 98 BPM

## 2022-12-07 VITALS — HEIGHT: 64 IN | BODY MASS INDEX: 52.24 KG/M2 | WEIGHT: 306 LBS

## 2022-12-07 DIAGNOSIS — B35.3 TINEA PEDIS OF BOTH FEET: ICD-10-CM

## 2022-12-07 DIAGNOSIS — E11.40 TYPE 2 DIABETES MELLITUS WITH DIABETIC NEUROPATHY, WITHOUT LONG-TERM CURRENT USE OF INSULIN (HCC): ICD-10-CM

## 2022-12-07 DIAGNOSIS — Z11.59 NEED FOR HEPATITIS C SCREENING TEST: ICD-10-CM

## 2022-12-07 DIAGNOSIS — F11.20 UNCOMPLICATED OPIOID DEPENDENCE (HCC): ICD-10-CM

## 2022-12-07 DIAGNOSIS — Z12.5 PROSTATE CANCER SCREENING: ICD-10-CM

## 2022-12-07 DIAGNOSIS — E66.01 MORBID OBESITY (HCC): ICD-10-CM

## 2022-12-07 DIAGNOSIS — Z79.891 ENCOUNTER FOR LONG-TERM OPIATE ANALGESIC USE: ICD-10-CM

## 2022-12-07 DIAGNOSIS — E11.40 DIABETIC NEUROPATHY, PAINFUL (HCC): ICD-10-CM

## 2022-12-07 DIAGNOSIS — G89.29 CHRONIC LEFT HIP PAIN: ICD-10-CM

## 2022-12-07 DIAGNOSIS — M16.12 PRIMARY OSTEOARTHRITIS OF LEFT HIP: ICD-10-CM

## 2022-12-07 DIAGNOSIS — Z11.4 SCREENING FOR HIV (HUMAN IMMUNODEFICIENCY VIRUS): ICD-10-CM

## 2022-12-07 DIAGNOSIS — M70.62 GREATER TROCHANTERIC BURSITIS OF LEFT HIP: ICD-10-CM

## 2022-12-07 DIAGNOSIS — B35.1 ONYCHOMYCOSIS: ICD-10-CM

## 2022-12-07 DIAGNOSIS — G89.4 CHRONIC PAIN SYNDROME: Primary | ICD-10-CM

## 2022-12-07 DIAGNOSIS — M25.552 CHRONIC LEFT HIP PAIN: ICD-10-CM

## 2022-12-07 LAB
EST. AVERAGE GLUCOSE BLD GHB EST-MCNC: 174 MG/DL
HBA1C MFR BLD: 7.7 %
HCV AB SER QL: NORMAL
PSA SERPL-MCNC: 5.6 NG/ML (ref 0–4)

## 2022-12-07 RX ORDER — KETOCONAZOLE 20 MG/G
CREAM TOPICAL DAILY
Qty: 60 G | Refills: 2 | Status: SHIPPED | OUTPATIENT
Start: 2022-12-07

## 2022-12-07 RX ORDER — HYDROCODONE BITARTRATE AND ACETAMINOPHEN 7.5; 325 MG/1; MG/1
1 TABLET ORAL 2 TIMES DAILY PRN
Qty: 60 TABLET | Refills: 0 | Status: SHIPPED | OUTPATIENT
Start: 2022-12-07

## 2022-12-07 NOTE — PROGRESS NOTES
Diabetic Foot Exam    Patient's shoes and socks removed  Right Foot/Ankle   Right Foot Inspection  Skin Exam: skin normal, skin intact and dry skin  No warmth, no callus, no erythema, no maceration, no abnormal color, no pre-ulcer, no ulcer and no callus  Sensory   Vibration: diminished  Proprioception: intact  Monofilament testing: intact    Vascular  Capillary refills: elevated  The right DP pulse is 1+  The right PT pulse is 1+  Left Foot/Ankle  Left Foot Inspection  Skin Exam: skin normal, skin intact and dry skin  No warmth, no erythema, no maceration, normal color, no pre-ulcer, no ulcer and no callus  Sensory   Vibration: diminished  Proprioception: intact  Monofilament testing: intact    Vascular  The left DP pulse is 1+  The left PT pulse is 1+  Assign Risk Category  No deformity present  No loss of protective sensation  Weak pulses  Risk: 0             PATIENT:  Macie Thao    1960    ASSESSMENT:     1  Type 2 diabetes mellitus with diabetic neuropathy, without long-term current use of insulin ([facility] [address] )  Ambulatory Referral to Podiatry    increse dulaglutide from 0 75 weekly to 1 5 mg weekly x 4 weeks  - titrate as needed next visit  - hba1c in 3 months      2  Tinea pedis of both feet  Ambulatory Referral to Podiatry      3  Onychomycosis  Ambulatory Referral to Podiatry            PLAN:  1  Patient was counseled on the condition and diagnosis  2   Educated disease prevention and risks related to diabetes  3   Educated proper daily foot care and exam   Instructed proper skin care / protection and footwear  Instructed to identify any signs of infection and related foot problem  4   The recent blood work was reviewed / discussed and the last HbA1c was 12  Discussed proper blood glucose control with diet and exercise      5   The patient will return in 12 months for diabetic foot exam     • High risk  foot precautions reviewed with patient including the need to wear protective shoegear at all times when walking including in the home, the need to check feet all surfaces daily with a mirror and report and skin breaks to podiatry, the need to apply an emollient to skin of feet daily  • Diabetic Foot Ulcer Risks    - Between 10-15% of diabetic foot ulcers do not heal [v]  - Of diabetic foot ulcers that do not heal, 25% will require amputation  [v]    iv Maranda Cobos V , Babak Trevino , and Prashanth FAYE , Foot Ulcers in the Diabetic Patient, Prevention and Treatment  Vascular Health Risk Management  2007 Feb; 3(1): 65-76  v ASHLEY Alejandro , MARIA TERESA Rodriguez , NEYDA Guerreor , AILIN Loza , ASHLEY Dozier T , Risk factors for lower extremity amputation in patients with diabetic foot ulcers: a hospital-based case-control study  Diabetic Foot & Ankle, 2015  6:10 [id number]      Procedure: All mycotic toenails were reduced and debrided in length, width, and girth using a nail nipper and dremel  All hyperkeratotic skin lesion(s) were sharply pared with a scalpel with no bleeding or evidence of ulceration  Patient tolerated procedure(s) well without complications  • 36173, Q9  • He is a low risk diabetic from a sensation standpoint high risk from a circulatory and also uncontrolled diabetic standpoint  • - We discussed at length that if he did not get his A1c under control he would likely be at high risk for increased neuropathic symptoms and loss of protective sensation which could lead to amputation overload etc   • Rx given for Penlac and also ketoconazole to treat both onychomycosis and tinea pedis respectively, we discussed at length fungal recurrence and advised to maintain his environment for fungal spores  Imaging: I have personally reviewed pertinent films in PACS  Labs, pathology, and Other Studies: I have personally reviewed pertinent reports          Subjective:          Patient presents for evaluation management of bilateral feet, has been diabetic for greater than 2 years and notes that his last A1c was 12  He understands that this is way too high and has been working diligently on bringing this down  He has had a repeat draw this morning and is awaiting the results  He denies any history of ulceration, numbness burning or tingling  There is difficulty bending down and cutting his own toenails, they are elongated thickened and caught in his socks and shoes  He is also complaining of dry cracked scaly skin bilateral feet  The patient presents for diabetic foot evaluation  The patient has diabetes for >2 years  The following portions of the patient's history were reviewed and updated as appropriate: allergies, current medications, past family history, past medical history, past social history, past surgical history and problem list   All pertinent labs and images were reviewed  Past Medical History  Past Medical History:   Diagnosis Date   • CAD (coronary artery disease)     s/p CABG x 2 LIMA-LAD, VG- OM1 8/5/2013   • Carotid duplex 08/05/2013    20-49% bilateral stenosis   • Diabetes ([facility] [address] )    • History of echocardiogram 12/22/2016    EF 55% Mild LVH  Mild MR   • Hyperlipidemia    • Hypertension        Past Surgical History  Past Surgical History:   Procedure Laterality Date   • CARDIAC CATHETERIZATION  08/02/2013    EF 50% Severe left main disease 90%, OM1 99%, 100% RCA (bypass scheduled)   • CORONARY ARTERY BYPASS GRAFT  08/05/2013    CABG X2 LIMA-LAD, VG-OM1   • WOUND DEBRIDEMENT Right 8/12/2020    Procedure: EXCISIONAL DEBRIDEMENT Right pannus/groin;  Surgeon: Friddie Scheuermann, MD;  [facility];  Service: Trauma   • WOUND DEBRIDEMENT Right 8/13/2020    Procedure: EXCISIONAL DEBRIDEMENT, placement of drain and closure of wound;  Surgeon: Ethan Garcia DO;  [facility]: [facility];  Service: General        Allergies:  Patient has no known allergies      Medications:  Current Outpatient Medications   Medication Sig Dispense Refill   • aspirin 81 MG tablet Take 1 tablet (81 mg total) by mouth daily     • atorvastatin (LIPITOR) 80 mg tablet Take 1 tablet (80 mg total) by mouth daily 90 tablet 1   • Blood Glucose Monitoring Suppl (OneTouch Verio Reflect) w/Device KIT Check blood sugars twice daily  Please substitute with appropriate alternative as covered by patient's insurance  Dx: E11 65 1 kit 0   • cyclobenzaprine (FLEXERIL) 10 mg tablet Take 1 tablet (10 mg total) by mouth 3 (three) times a day as needed for muscle spasms 30 tablet 0   • Dulaglutide 1 5 MG/0 5ML SOPN Inject 0 5 mL (1 5 mg total) under the skin once a week for 4 doses 2 mL 0   • famotidine (PEPCID) 20 mg tablet take 1 tablet by mouth once daily for HEARTBURN 30 tablet 0   • gabapentin (NEURONTIN) 300 mg capsule take 1 capsule by mouth three times a day 90 capsule 2   • glucose blood (OneTouch Verio) test strip Check blood sugars twice daily  Please substitute with appropriate alternative as covered by patient's insurance   Dx: E11 65 200 each 3   • glucose monitoring kit (FREESTYLE) monitoring kit Use 1 each 2 (two) times a day 1 each 0   • HYDROcodone-acetaminophen (NORCO) 7 5-325 mg per tablet Take 1 tablet by mouth 2 (two) times a day as needed for pain Do not fill until 1/4/2023 Max Daily Amount: 2 tablets 60 tablet 0   • HYDROcodone-acetaminophen (NORCO) 7 5-325 mg per tablet Take 1 tablet by mouth 2 (two) times a day as needed for pain Max Daily Amount: 2 tablets 60 tablet 0   • ibuprofen (MOTRIN) 800 mg tablet Take 1 tablet (800 mg total) by mouth every 8 (eight) hours as needed for mild pain, moderate pain, fever or headaches 40 tablet 0   • lisinopril (ZESTRIL) 5 mg tablet Take 1 tablet (5 mg total) by mouth daily 90 tablet 1   • metFORMIN (GLUCOPHAGE) 1000 MG tablet Take 1,000 mg by mouth 2 (two) times a day with meals     • metoprolol tartrate (LOPRESSOR) 50 mg tablet Take 50 mg by mouth every 12 (twelve) hours     • nitroglycerin (NITROSTAT) 0 4 mg SL tablet Place 1 tablet under the tongue as needed     • OneTouch Delica Lancets 73X MISC Check blood sugars twice daily  Please substitute with appropriate alternative as covered by patient's insurance  Dx: E11 65 200 each 3   • sulfamethoxazole-trimethoprim (BACTRIM DS) 800-160 mg per tablet Take 1 tablet by mouth 2 (two) times a day for 10 days smx-tmp DS (BACTRIM) 800-160 mg tabs (1tab q12 D10) 20 tablet 0   • nystatin (MYCOSTATIN) powder Apply topically 2 (two) times a day 15 g 0     No current facility-administered medications for this visit  Social History:  Social History     Socioeconomic History   • Marital status: /Civil Union     Spouse name: None   • Number of children: None   • Years of education: None   • Highest education level: None   Occupational History   • None   Tobacco Use   • Smoking status: Former     Packs/day: 2 00     Years: 30 00     Pack years: 60 00     Types: Cigarettes     Quit date:      Years since quittin 9   • Smokeless tobacco: Former   Vaping Use   • Vaping Use: Never used   Substance and Sexual Activity   • Alcohol use: Not Currently   • Drug use: Not Currently   • Sexual activity: None   Other Topics Concern   • None   Social History Narrative   • None     Social Determinants of Health     Financial Resource Strain: Not on file   Food Insecurity: Not on file   Transportation Needs: Not on file   Physical Activity: Not on file   Stress: Not on file   Social Connections: Not on file   Intimate Partner Violence: Not on file   Housing Stability: Not on file        Review of Systems   Constitutional: Negative for chills and fever  HENT: Negative for ear pain and sore throat  Eyes: Negative for pain and visual disturbance  Respiratory: Negative for cough and shortness of breath  Cardiovascular: Negative for chest pain and palpitations  Gastrointestinal: Negative for abdominal pain and vomiting  Genitourinary: Negative for dysuria and hematuria  Musculoskeletal: Negative for arthralgias and back pain     Skin: Negative for color change and rash  Neurological: Negative for seizures and syncope  All other systems reviewed and are negative  Objective:      Ht 5' 4" (1 626 m)   Wt (!) 139 kg (306 lb)   BMI 52 52 kg/m²          Physical Exam  Cardiovascular:      Pulses: Pulses are weak  Dorsalis pedis pulses are 1+ on the right side and 1+ on the left side  Posterior tibial pulses are 1+ on the right side and 1+ on the left side  Feet:      Right foot:      Skin integrity: Dry skin present  No ulcer, skin breakdown, erythema, warmth or callus  Left foot:      Skin integrity: Dry skin present  No ulcer, skin breakdown, erythema, warmth or callus

## 2022-12-07 NOTE — PATIENT INSTRUCTIONS

## 2022-12-07 NOTE — PROGRESS NOTES
Assessment:  1  Chronic pain syndrome    2  Chronic left hip pain    3  Greater trochanteric bursitis of left hip    4  Primary osteoarthritis of left hip    5  Diabetic neuropathy, painful ([address] )    6  Encounter for long-term opiate analgesic use    7  Uncomplicated opioid dependence ([address] )    8  Morbid obesity ([facility] [address] )        Plan:  While the patient was in the office today, I did have a thorough conversation regarding their chronic pain syndrome, medication management, and treatment plan options  Patient is being seen for follow-up visit  He continues with right hip pain  We will consider greater trochanteric bursa injections in the future when his blood sugars have normalized  He had a hemoglobin A1c performed today  The results are still not available  He was using Trulicity for almost 2 months, but his insurance would not authorize the most recent refill  He tells me that he is getting new insurance in January and should be able to start back up on the Trulicity as well as metformin  Continue gabapentin 300 mg 3 times daily  He did not require refill of this medication during today's visit  Continue hydrocodone 7 5/325  He may take this up to 2 times daily if needed for pain  The patient's opioid scripts were sent to their pharmacy electronically and was given a 2 month supply of prescriptions with a Do Not Fill date(s) of 12/7/2022, 1/4/2023  [place] Prescription Drug Monitoring Program report was reviewed and was appropriate     A urine drug screen was collected at today's office visit as part of our medication management protocol  The point of care testing results were appropriate for what was being prescribed  The specimen will be sent for confirmatory testing  The drug screen is medically necessary because the patient is either dependent on opioid medication or is being considered for opioid medication therapy and the results could impact ongoing or future treatment   The drug screen is to evaluate for the presences or absence of prescribed, non-prescribed, and/or illicit drugs/substances  There are risks associated with opioid medications, including dependence, addiction and tolerance  The patient understands and agrees to use these medications only as prescribed  Potential side effects of the medications include, but are not limited to, constipation, drowsiness, addiction, impaired judgment and risk of fatal overdose if not taken as prescribed  The patient was warned against driving while taking sedation medications  Sharing medications is a felony  At this point in time, the patient is showing no signs of addiction, abuse, diversion or suicidal ideation  The patient will follow-up in 8 weeks for medication prescription refill and reevaluation  The patient was advised to contact the office should their symptoms worsen in the interim  The patient was agreeable and verbalized an understanding  History of Present Illness: The patient is a 65 y o  male last seen on 10/7/2022 who presents for a follow up office visit in regards to chronic pain secondary to chronic pain syndrome, chronic left hip pain, left greater trochanteric bursitis     The patient currently reports plaints of left hip pain  Current pain medications includes: Hydrocodone 7 5/325 once daily if needed for pain, gabapentin 300 mg 3 times daily  The patient reports that this regimen is providing 30-40% pain relief  The patient is reporting no side effects from this pain medication regimen  Pain Contract Signed: 10/7/22  Last Urine Drug Screen: 12/7/22    I have personally reviewed and/or updated the patient's past medical history, past surgical history, family history, social history, current medications, allergies, and vital signs today  Review of Systems:    Review of Systems   Constitutional: Negative for unexpected weight change  HENT: Negative for hearing loss      Eyes: Negative for visual disturbance  Respiratory: Negative for shortness of breath  Cardiovascular: Negative for leg swelling  Gastrointestinal: Negative for constipation  Endocrine: Negative for polyuria  Genitourinary: Negative for difficulty urinating  Musculoskeletal: Positive for gait problem  Negative for joint swelling and myalgias  Joint stiffness   Skin: Negative for rash  Neurological: Negative for weakness and headaches  Psychiatric/Behavioral: Negative for decreased concentration  All other systems reviewed and are negative  Past Medical History:   Diagnosis Date   • CAD (coronary artery disease)     s/p CABG x 2 LIMA-LAD, VG- OM1 2013   • Carotid duplex 2013    20-49% bilateral stenosis   • Diabetes ([facility] [address] )    • History of echocardiogram 2016    EF 55% Mild LVH   Mild MR   • Hyperlipidemia    • Hypertension        Past Surgical History:   Procedure Laterality Date   • CARDIAC CATHETERIZATION  2013    EF 50% Severe left main disease 90%, OM1 99%, 100% RCA (bypass scheduled)   • CORONARY ARTERY BYPASS GRAFT  2013    CABG X2 LIMA-LAD, VG-OM1   • WOUND DEBRIDEMENT Right 2020    Procedure: EXCISIONAL DEBRIDEMENT Right pannus/groin;  Surgeon: Feroz Garcia MD;  [facility];  Service: Trauma   • WOUND DEBRIDEMENT Right 2020    Procedure: EXCISIONAL DEBRIDEMENT, placement of drain and closure of wound;  Surgeon: Loren Pierce DO;  [facility];  Service: General       Family History   Problem Relation Age of Onset   • Diabetes Mother    • Heart disease Mother    • Stroke Mother    • Obesity Mother    • Diabetes Brother    • Heart disease Brother    • Obesity Brother    • Cancer Neg Hx        Social History     Occupational History   • Not on file   Tobacco Use   • Smoking status: Former     Packs/day: 2 00     Years: 30 00     Pack years: 60 00     Types: Cigarettes     Quit date:      Years since quittin 9   • Smokeless tobacco: Former   Vaping Use   • Vaping Use: Never used   Substance and Sexual Activity   • Alcohol use: Not Currently   • Drug use: Not Currently   • Sexual activity: Not on file         Current Outpatient Medications:   •  aspirin 81 MG tablet, Take 1 tablet (81 mg total) by mouth daily, Disp: , Rfl:   •  atorvastatin (LIPITOR) 80 mg tablet, Take 1 tablet (80 mg total) by mouth daily, Disp: 90 tablet, Rfl: 1  •  Blood Glucose Monitoring Suppl (OneTouch Verio Reflect) w/Device KIT, Check blood sugars twice daily  Please substitute with appropriate alternative as covered by patient's insurance  Dx: E11 65, Disp: 1 kit, Rfl: 0  •  cyclobenzaprine (FLEXERIL) 10 mg tablet, Take 1 tablet (10 mg total) by mouth 3 (three) times a day as needed for muscle spasms, Disp: 30 tablet, Rfl: 0  •  Dulaglutide 1 5 MG/0 5ML SOPN, Inject 0 5 mL (1 5 mg total) under the skin once a week for 4 doses, Disp: 2 mL, Rfl: 0  •  famotidine (PEPCID) 20 mg tablet, take 1 tablet by mouth once daily for HEARTBURN, Disp: 30 tablet, Rfl: 0  •  gabapentin (NEURONTIN) 300 mg capsule, take 1 capsule by mouth three times a day, Disp: 90 capsule, Rfl: 2  •  glucose blood (OneTouch Verio) test strip, Check blood sugars twice daily  Please substitute with appropriate alternative as covered by patient's insurance   Dx: E11 65, Disp: 200 each, Rfl: 3  •  glucose monitoring kit (FREESTYLE) monitoring kit, Use 1 each 2 (two) times a day, Disp: 1 each, Rfl: 0  •  HYDROcodone-acetaminophen (NORCO) 7 5-325 mg per tablet, Take 1 tablet by mouth 2 (two) times a day as needed for pain Do not fill until 1/4/2023 Max Daily Amount: 2 tablets, Disp: 60 tablet, Rfl: 0  •  HYDROcodone-acetaminophen (NORCO) 7 5-325 mg per tablet, Take 1 tablet by mouth 2 (two) times a day as needed for pain Max Daily Amount: 2 tablets, Disp: 60 tablet, Rfl: 0  •  ibuprofen (MOTRIN) 800 mg tablet, Take 1 tablet (800 mg total) by mouth every 8 (eight) hours as needed for mild pain, moderate pain, fever or headaches, Disp: 40 tablet, Rfl: 0  •  lisinopril (ZESTRIL) 5 mg tablet, Take 1 tablet (5 mg total) by mouth daily, Disp: 90 tablet, Rfl: 1  •  metFORMIN (GLUCOPHAGE) 1000 MG tablet, Take 1,000 mg by mouth 2 (two) times a day with meals, Disp: , Rfl:   •  metoprolol tartrate (LOPRESSOR) 50 mg tablet, Take 50 mg by mouth every 12 (twelve) hours, Disp: , Rfl:   •  nitroglycerin (NITROSTAT) 0 4 mg SL tablet, Place 1 tablet under the tongue as needed, Disp: , Rfl:   •  OneTouch Delica Lancets 42T MISC, Check blood sugars twice daily  Please substitute with appropriate alternative as covered by patient's insurance  Dx: E11 65, Disp: 200 each, Rfl: 3  •  sulfamethoxazole-trimethoprim (BACTRIM DS) 800-160 mg per tablet, Take 1 tablet by mouth 2 (two) times a day for 10 days smx-tmp DS (BACTRIM) 800-160 mg tabs (1tab q12 D10), Disp: 20 tablet, Rfl: 0  •  nystatin (MYCOSTATIN) powder, Apply topically 2 (two) times a day, Disp: 15 g, Rfl: 0    No Known Allergies    Physical Exam:    /76   Pulse 98   Ht 5' 4" (1 626 m)   Wt (!) 139 kg (306 lb)   SpO2 98%   BMI 52 52 kg/m²     Constitutional:normal, well developed, well nourished, alert, in no distress and non-toxic and no overt pain behavior  and obese  Eyes:anicteric  HEENT:grossly intact  Neck:supple, symmetric, trachea midline and no masses   Pulmonary:even and unlabored  Cardiovascular:No edema or pitting edema present  Skin:Normal without rashes or lesions and well hydrated  Psychiatric:Mood and affect appropriate  Neurologic:Cranial Nerves II-XII grossly intact  Musculoskeletal: Gait is antalgic and slow  He ambulates with 2 canes  Imaging  No orders to display         No orders of the defined types were placed in this encounter

## 2022-12-07 NOTE — PATIENT INSTRUCTIONS
Diabetic Foot Ulcers   AMBULATORY CARE:   A diabetic foot ulcer  can be redness over a bony area or an open sore  The ulcer can develop anywhere on your foot or toes  Ulcers usually develop on the bottom of the foot  You may not know you have an ulcer until you notice drainage on your sock  Drainage is fluid that may be yellow, brown, or red  The fluid may also contain pus or blood  Call your local emergency number (911 in the [address] [facility]) if:  You have a fever with chills  You begin vomiting  You feel faint or become confused  Call your doctor if:   You see new drainage on your sock  Your foot becomes red, warm, and swollen  Your foot ulcer has a bad smell or is draining pus  You feel pain in a foot that used to have little or no feeling  You see black or dead tissue in or around your ulcer  Your ulcer becomes bigger, deeper, or does not heal      You have questions or concerns about your condition or care  Treatment  may include a hospital stay and any of the following:  Debridement (removal) of dead tissue  may be done in and around your foot ulcer  This procedure may be done in your hospital room  A bandage  help keep your wound area moist and free from infection  The bandage may contain medicines to help your ulcer heal and prevent growth of unhealthy tissue  Offloading (taking the pressure off) the area  can help it heal  Healthcare providers may use cushions or braces, or custom foot wear may be ordered  You may be asked to stay in bed or use a wheelchair or crutches  These devices help decrease the amount of weight and pressure placed on your foot  Surgery  may be needed if you have an infection and decreased blood flow to your foot  Care for your wound as directed:  A bandage will be put on your ulcer  Your healthcare provider will give you instructions on changing your bandage  You may need to clean the wound and change the bandage daily   The bandage may contain medicines to help your ulcer heal  You may be asked to put medicine on your foot ulcer before putting on the bandage  The medicine may also prevent growth of tissue that is not healthy  You may need to cover your wound with a plastic bag while you bathe  Ask your healthcare provider for instructions on bathing until your foot heals  Prevent diabetic foot ulcers:  Good foot care may help prevent ulcers, or keep them from getting worse  Ask someone to help you if you are not able to check your feet by yourself  You or another person may need to do any of the following:  Keep your blood sugar levels under control  Continue the plan for your diabetes that you and your healthcare provider have discussed  Healthy food choices and taking your medicines as directed may help control blood sugars  Contact your healthcare provider if your blood sugar levels are higher than directed  Wash your feet each day with soap and warm water  Do not use hot water, because this can injure your foot  Dry your feet gently with a towel after you wash them  Dry between and under your toes  Apply lotion or a moisturizer on your dry feet  Ask your healthcare provider what lotions are best to use  Do not put lotion or moisturizer between your toes  Moisture between your toes could lead to skin breakdown  Check your feet each day  Look at your whole foot, including the bottom, and between and under your toes  Check for wounds, corns, and calluses  Feel your feet by running your hands along the tops, bottoms, sides, and between your toes  Use a nonbreakable mirror to check your feet if you have trouble seeing the bottoms  Do not try to remove corns or calluses yourself  File or cut your toenails straight across  Protect your feet  Do not walk barefoot or wear your shoes without socks  Check your shoes for rocks or other objects that can hurt your feet  Wear cotton socks to help keep your feet dry   Wear socks without toe seams, or wear them with the seams inside out  Change your socks each day  Do not wear socks that are dirty or damp  Wear shoes that fit well  Wear shoes that do not rub against any area of your feet  Your shoes should be ½ to ¾ inch (1 to 2 centimeters) longer than your feet  Your shoes should also have extra space around the widest part of your feet  Walking or athletic shoes with laces or straps that adjust are best  Ask your healthcare provider for help to choose shoes that fit you best  Ask him or her if you need to wear an insert, orthotic, or bandage on your feet  Do not smoke  Nicotine can cause damage to your blood vessels and increases your risk for foot ulcers  Do not use e-cigarettes or smokeless tobacco in place of cigarettes or to help you quit  They still contain nicotine  Ask your healthcare provider for information if you currently smoke and need help quitting  Know the risks if you choose to drink alcohol  Alcohol can cause your blood sugar levels to be low if you use insulin  Alcohol can cause high blood sugar levels and weight gain if you drink too much  A drink of alcohol is 12 ounces of beer, 5 ounces of wine, or 1½ ounces of liquor  Maintain a healthy weight  Ask your healthcare provider how much you should weigh  A healthy weight can help you control your diabetes  Ask him or her to help you create a weight loss plan if you are overweight  Even a 10 to 15 pound weight loss can help you better manage your blood sugar level  Follow up with your healthcare provider or foot specialist as directed: You may need to return often to have your wound checked  Your wound may be measured to see if it is getting smaller  Bring any offloading devices or footwear to your follow-up visits so your healthcare provider or specialist can check them  Write down your questions so you remember to ask them during your visits     © Copyright [employer] 2022 Information is for End User's use only and may not be sold, redistributed or otherwise used for commercial purposes  All illustrations and images included in CareNotes® are the copyrighted property of A [employer]  or Giancarlo Cardona  The above information is an  only  It is not intended as medical advice for individual conditions or treatments  Talk to your doctor, nurse or pharmacist before following any medical regimen to see if it is safe and effective for you

## 2022-12-08 LAB — HIV 1+2 AB+HIV1 P24 AG SERPL QL IA: NORMAL

## 2022-12-09 DIAGNOSIS — R97.20 ELEVATED PSA, LESS THAN 10 NG/ML: Primary | ICD-10-CM

## 2022-12-09 LAB
6MAM UR QL CFM: NEGATIVE NG/ML
7AMINOCLONAZEPAM UR QL CFM: NEGATIVE NG/ML
A-OH ALPRAZ UR QL CFM: NEGATIVE NG/ML
ACCEPTABLE CREAT UR QL: NORMAL MG/DL
ACCEPTIBLE SP GR UR QL: NORMAL
AMPHET UR QL CFM: NEGATIVE NG/ML
AMPHET UR QL CFM: NEGATIVE NG/ML
BUPRENORPHINE UR QL CFM: NEGATIVE NG/ML
BUTALBITAL UR QL CFM: NEGATIVE NG/ML
BZE UR QL CFM: NEGATIVE NG/ML
CODEINE UR QL CFM: NEGATIVE NG/ML
DESIPRAMINE UR QL CFM: NEGATIVE NG/ML
EDDP UR QL CFM: NEGATIVE NG/ML
ETHYL GLUCURONIDE UR QL CFM: NEGATIVE NG/ML
ETHYL SULFATE UR QL SCN: NEGATIVE NG/ML
FENTANYL UR QL CFM: NEGATIVE NG/ML
GLIADIN IGG SER IA-ACNC: NEGATIVE NG/ML
GLUCOSE 30M P 50 G LAC PO SERPL-MCNC: NEGATIVE NG/ML
HYDROCODONE UR QL CFM: NORMAL NG/ML
HYDROCODONE UR QL CFM: NORMAL NG/ML
HYDROMORPHONE UR QL CFM: NORMAL NG/ML
LORAZEPAM UR QL CFM: NEGATIVE NG/ML
MDMA UR QL CFM: NEGATIVE NG/ML
ME-PHENIDATE UR QL CFM: NEGATIVE NG/ML
MEPERIDINE UR QL CFM: NEGATIVE NG/ML
METHADONE UR QL CFM: NEGATIVE NG/ML
METHAMPHET UR QL CFM: NEGATIVE NG/ML
MORPHINE UR QL CFM: NEGATIVE NG/ML
MORPHINE UR QL CFM: NEGATIVE NG/ML
NITRITE UR QL: NORMAL UG/ML
NORBUPRENORPHINE UR QL CFM: NEGATIVE NG/ML
NORDIAZEPAM UR QL CFM: NEGATIVE NG/ML
NORFENTANYL UR QL CFM: NEGATIVE NG/ML
NORHYDROCODONE UR QL CFM: NORMAL NG/ML
NORHYDROCODONE UR QL CFM: NORMAL NG/ML
NORMEPERIDINE UR QL CFM: NEGATIVE NG/ML
NOROXYCODONE UR QL CFM: ABNORMAL NG/ML
OLANZAPINE QUANTIFICATION: NEGATIVE NG/ML
OPC-3373 QUANTIFICATION: NEGATIVE
OXAZEPAM UR QL CFM: NEGATIVE NG/ML
OXYCODONE UR QL CFM: ABNORMAL NG/ML
OXYMORPHONE UR QL CFM: ABNORMAL NG/ML
OXYMORPHONE UR QL CFM: ABNORMAL NG/ML
PARA-FLUOROFENTANYL QUANTIFICATION: NORMAL NG/ML
PCP UR QL CFM: NEGATIVE NG/ML
PHENOBARB UR QL CFM: NEGATIVE NG/ML
RESULT ALL_PRESCRIBED MEDS AND SPECIAL INSTRUCTIONS: NORMAL
SECOBARBITAL UR QL CFM: NEGATIVE NG/ML
SL AMB 4-ANPP QUANTIFICATION: NORMAL NG/ML
SL AMB 7-OH-MITRAGYNINE (KRATOM ALKALOID) QUANTIFICATION: NEGATIVE NG/ML
SL AMB ACETYL FENTANYL QUANTIFICATION: NORMAL NG/ML
SL AMB ACETYL NORFENTANYL QUANTIFICATION: NORMAL NG/ML
SL AMB ACRYL FENTANYL QUANTIFICATION: NORMAL NG/ML
SL AMB CARFENTANIL QUANTIFICATION: NORMAL NG/ML
SL AMB CLOZAPINE QUANTIFICATION: NEGATIVE NG/ML
SL AMB CTHC (MARIJUANA METABOLITE) QUANTIFICATION: NEGATIVE NG/ML
SL AMB DEXTRORPHAN (DEXTROMETHORPHAN METABOLITE) QUANT: NEGATIVE NG/ML
SL AMB HALOPERIDOL  QUANTIFICATION: NEGATIVE NG/ML
SL AMB HALOPERIDOL METABOLITE QUANTIFICATION: NEGATIVE NG/ML
SL AMB HYDROXYRISPERIDONE QUANTIFICATION: NEGATIVE NG/ML
SL AMB N-DESMETHYL-TRAMADOL QUANTIFICATION: NEGATIVE NG/ML
SL AMB N-DESMETHYLCLOZAPINE QUANTIFICATION: NEGATIVE NG/ML
SL AMB NORQUETIAPINE QUANTIFICATION: NEGATIVE NG/ML
SL AMB PHENTERMINE QUANTIFICATION: NEGATIVE NG/ML
SL AMB PREGABALIN QUANTIFICATION: NEGATIVE
SL AMB QUETIAPINE QUANTIFICATION: NEGATIVE NG/ML
SL AMB RISPERIDONE QUANTIFICATION: NEGATIVE NG/ML
SL AMB RITALINIC ACID QUANTIFICATION: NEGATIVE NG/ML
SPECIMEN PH ACCEPTABLE UR: NORMAL
TAPENTADOL UR QL CFM: NEGATIVE NG/ML
TEMAZEPAM UR QL CFM: NEGATIVE NG/ML
TEMAZEPAM UR QL CFM: NEGATIVE NG/ML
TRAMADOL UR QL CFM: NEGATIVE NG/ML
URATE/CREAT 24H UR: NEGATIVE NG/ML

## 2022-12-19 ENCOUNTER — TELEPHONE (OUTPATIENT)
Dept: PAIN MEDICINE | Facility: CLINIC | Age: 62
End: 2022-12-19

## 2022-12-19 DIAGNOSIS — M25.552 CHRONIC LEFT HIP PAIN: ICD-10-CM

## 2022-12-19 DIAGNOSIS — M16.12 PRIMARY OSTEOARTHRITIS OF LEFT HIP: ICD-10-CM

## 2022-12-19 DIAGNOSIS — G89.29 CHRONIC LEFT HIP PAIN: ICD-10-CM

## 2022-12-19 DIAGNOSIS — G89.4 CHRONIC PAIN SYNDROME: ICD-10-CM

## 2022-12-19 DIAGNOSIS — B35.1 ONYCHOMYCOSIS: ICD-10-CM

## 2022-12-19 NOTE — TELEPHONE ENCOUNTER
----- Message from Salazar Sommers William Cardona sent at 12/19/2022 12:04 PM EST -----  Please call patient and advise him that the recent urine drug screen performed at his office visit was positive for hydrocodone which is consistent with him taking medication as prescribed  Unfortunately, the drug screen was also positive for oxycodone  Oxycodone has never been prescribed by our office and I cannot match it to any medications that he has been prescribed  At this point, we will no longer be able to prescribe opiates for him  Please advise him of the following weaning schedule: Hydrocodone 7 5/325 daily x5 days, then discontinue  Please advise patient that combining opiates is dangerous and can lead to respiratory depression/death

## 2022-12-20 NOTE — TELEPHONE ENCOUNTER
Caller: patient    Doctor: Rory Goodpasture    Reason for call: returning missed call, stated to call him stalin [phone]  Call back#:

## 2022-12-20 NOTE — TELEPHONE ENCOUNTER
S/W pt and advised  States he is not sure how that happened   Weaning recommendation given and pt verbalized understanding of all instruction from [facility] and that our office will no longer be able to prescribe opiates to him

## 2022-12-27 DIAGNOSIS — E11.40 TYPE 2 DIABETES MELLITUS WITH DIABETIC NEUROPATHY, WITHOUT LONG-TERM CURRENT USE OF INSULIN (HCC): Primary | ICD-10-CM

## 2023-02-14 ENCOUNTER — VBI (OUTPATIENT)
Dept: ADMINISTRATIVE | Facility: OTHER | Age: 63
End: 2023-02-14

## 2023-02-28 DIAGNOSIS — E11.40 TYPE 2 DIABETES MELLITUS WITH DIABETIC NEUROPATHY, WITHOUT LONG-TERM CURRENT USE OF INSULIN (HCC): ICD-10-CM

## 2023-03-06 PROBLEM — E11.40 TYPE 2 DIABETES MELLITUS WITH DIABETIC NEUROPATHY, WITHOUT LONG-TERM CURRENT USE OF INSULIN (HCC): Status: RESOLVED | Noted: 2018-11-14 | Resolved: 2023-03-06

## 2023-03-06 NOTE — PROGRESS NOTES
Assessment/Plan:    No problem-specific Assessment & Plan notes found for this encounter  Diagnoses and all orders for this visit:    Benign essential HTN  Comments:  Did not have insurance in January and February  Ordered lisinopril and metoprolol, FUP in 4 wk w/ BP log   Orders:  -     lisinopril (ZESTRIL) 5 mg tablet; Take 1 tablet (5 mg total) by mouth daily  -     metoprolol tartrate (LOPRESSOR) 50 mg tablet; Take 1 tablet (50 mg total) by mouth every 12 (twelve) hours    Encounter for screening for lung cancer  -     CT lung screening program; Future    Nicotine dependence, cigarettes, in remission  Comments:  Quitted 10 years ago  used for 20 yr, 1 pack/day  Orders:  -     CT lung screening program; Future    Diabetic neuropathy, painful ([facility] )  Comments:  Did not take metformin and Trulicity for 2 months due to insurance  relapse, a1c ordered, meds orderd for 1 mo, FUP in  4 wk w/ BS log  Orders:  -     Urine Microalbumin/creatinine ratio; Future    Uncomplicated opioid dependence ([facility] )  Comments:  2/2 to chronic pain, FUP w/  Chronic pain clinic, 12/7 blood work was + for morphine and oxycodone  Discharged from pain clinic  pt looking for new pain clinic    Chronic pain syndrome  -     ibuprofen (MOTRIN) 800 mg tablet; Take 1 tablet (800 mg total) by mouth every 8 (eight) hours as needed for mild pain, moderate pain, fever or headaches    Mixed hyperlipidemia  -     atorvastatin (LIPITOR) 80 mg tablet; Take 1 tablet (80 mg total) by mouth daily    Gastroesophageal reflux disease, unspecified whether esophagitis present  -     famotidine (PEPCID) 20 mg tablet; Take 1 tablet (20 mg total) by mouth 2 (two) times a day    Type 2 diabetes mellitus with other specified complication, without long-term current use of insulin (HCC)  Comments:  Review hemoglobin A1c 4 weeks  Did not take his medication for 2 months due to insurance relapse  Orders:  -     HEMOGLOBIN A1C W/ EAG ESTIMATION;  Future  -     Lipid panel; Future    Elevated PSA  Comments:  Needs to follow-up with urology, referral is in    Class 3 severe obesity due to excess calories with body mass index (BMI) of 50 0 to 59 9 in adult, unspecified whether serious comorbidity present ([facility] )  Comments:  Recommended healthy diet and exercise          Follow-up in 4 weeks patient will do hemoglobin A1c we may adjust his diabetes medication in 4 weeks  Asked patient to check his blood pressure and BS at home and bring bp and BS  logs  in 4 weeks also  Subjective:      Patient ID: Chalo Mata is a 65 y o  male  HPI     Patient is here for routine follow-up  Patient reports that he did not have insurance in January and February, and he was not able to take his Trulicity and metformin and rest of his  Medication  Patient did not have insurance in January  And February  He did not take his medications in January and February  Last hemoglobin was checked on December 7 which was 7 7, I reordered his hemoglobin A1c  Patient reports that he checks his blood sugar level at home in the morning  He showed me the sugar log which is mostly around mid to high 200s  She reports that he follows up with podiatry  Additionally patient reports that he is following up with eye doctor yearly  Following up with eye doctor year  Additionally patient reports that he used to follow-up with chronic pain management clinic secondary to chronic pain  He reports that in December his blood work was positive for abnormal finding, it was positive for oxycodone and oxymorphone  He reports that he took those medications from his cousin who passed away secondary to cancer  Patient reports that he has been discharged from pain management clinic  And currently he is looking to establish care with a new pain management clinic          The following portions of the patient's history were reviewed and updated as appropriate: allergies, current medications, past family history, past medical history, past social history, past surgical history and problem list     Review of Systems   Constitutional: Negative for chills and fever  HENT: Negative for ear pain and sore throat  Eyes: Negative for pain and visual disturbance  Respiratory: Negative for cough and shortness of breath  Cardiovascular: Negative for chest pain and palpitations  Gastrointestinal: Negative for abdominal pain and vomiting  Genitourinary: Negative for dysuria and hematuria  Musculoskeletal: Positive for arthralgias (Chronic hip pain bilateral)  Negative for back pain  Skin: Negative for color change and rash  Neurological: Negative for seizures and syncope  All other systems reviewed and are negative  Objective:      /76 (BP Location: Left arm, Patient Position: Sitting)   Pulse 101   Temp 98 5 °F (36 9 °C) (Tympanic)   Ht 5' 4" (1 626 m)   Wt (!) 137 kg (302 lb 12 8 oz)   SpO2 98%   BMI 51 98 kg/m²          Physical Exam  Vitals reviewed  Constitutional:       General: He is not in acute distress  Appearance: He is obese  HENT:      Head: Normocephalic and atraumatic  Nose: Nose normal       Mouth/Throat:      Mouth: Mucous membranes are moist    Eyes:      Extraocular Movements: Extraocular movements intact  Conjunctiva/sclera: Conjunctivae normal       Pupils: Pupils are equal, round, and reactive to light  Cardiovascular:      Rate and Rhythm: Normal rate and regular rhythm  Pulses: Normal pulses  Heart sounds: Normal heart sounds  No murmur heard  No gallop  Pulmonary:      Effort: Pulmonary effort is normal       Breath sounds: Normal breath sounds  Abdominal:      General: Abdomen is flat  Bowel sounds are normal  There is no distension  Palpations: Abdomen is soft  Musculoskeletal:      Right lower leg: No edema  Left lower leg: No edema  Comments: Chronic bilateral hip pain   Skin:     General: Skin is warm  Capillary Refill: Capillary refill takes less than 2 seconds  Neurological:      General: No focal deficit present  Mental Status: He is alert and oriented to person, place, and time  Psychiatric:         Mood and Affect: Mood normal          Thought Content:  Thought content normal          Judgment: Judgment normal

## 2023-03-07 ENCOUNTER — OFFICE VISIT (OUTPATIENT)
Dept: FAMILY MEDICINE CLINIC | Facility: CLINIC | Age: 63
End: 2023-03-07

## 2023-03-07 VITALS
BODY MASS INDEX: 51.7 KG/M2 | DIASTOLIC BLOOD PRESSURE: 76 MMHG | OXYGEN SATURATION: 98 % | SYSTOLIC BLOOD PRESSURE: 146 MMHG | TEMPERATURE: 98.5 F | HEIGHT: 64 IN | WEIGHT: 302.8 LBS | HEART RATE: 101 BPM

## 2023-03-07 DIAGNOSIS — E11.69 TYPE 2 DIABETES MELLITUS WITH OTHER SPECIFIED COMPLICATION, WITHOUT LONG-TERM CURRENT USE OF INSULIN (HCC): ICD-10-CM

## 2023-03-07 DIAGNOSIS — R97.20 ELEVATED PSA: ICD-10-CM

## 2023-03-07 DIAGNOSIS — F11.20 UNCOMPLICATED OPIOID DEPENDENCE (HCC): ICD-10-CM

## 2023-03-07 DIAGNOSIS — E66.01 CLASS 3 SEVERE OBESITY DUE TO EXCESS CALORIES WITH BODY MASS INDEX (BMI) OF 50.0 TO 59.9 IN ADULT, UNSPECIFIED WHETHER SERIOUS COMORBIDITY PRESENT (HCC): ICD-10-CM

## 2023-03-07 DIAGNOSIS — K21.9 GASTROESOPHAGEAL REFLUX DISEASE, UNSPECIFIED WHETHER ESOPHAGITIS PRESENT: ICD-10-CM

## 2023-03-07 DIAGNOSIS — E78.2 MIXED HYPERLIPIDEMIA: ICD-10-CM

## 2023-03-07 DIAGNOSIS — E11.40 DIABETIC NEUROPATHY, PAINFUL (HCC): ICD-10-CM

## 2023-03-07 DIAGNOSIS — G89.4 CHRONIC PAIN SYNDROME: ICD-10-CM

## 2023-03-07 DIAGNOSIS — F17.211 NICOTINE DEPENDENCE, CIGARETTES, IN REMISSION: ICD-10-CM

## 2023-03-07 DIAGNOSIS — Z12.2 ENCOUNTER FOR SCREENING FOR LUNG CANCER: ICD-10-CM

## 2023-03-07 DIAGNOSIS — I10 BENIGN ESSENTIAL HTN: Primary | ICD-10-CM

## 2023-03-07 RX ORDER — LISINOPRIL 5 MG/1
5 TABLET ORAL DAILY
Qty: 90 TABLET | Refills: 1 | Status: SHIPPED | OUTPATIENT
Start: 2023-03-07

## 2023-03-07 RX ORDER — FAMOTIDINE 20 MG/1
20 TABLET, FILM COATED ORAL 2 TIMES DAILY
Qty: 60 TABLET | Refills: 7 | Status: SHIPPED | OUTPATIENT
Start: 2023-03-07 | End: 2023-04-06

## 2023-03-07 RX ORDER — ATORVASTATIN CALCIUM 80 MG/1
80 TABLET, FILM COATED ORAL DAILY
Qty: 90 TABLET | Refills: 1 | Status: SHIPPED | OUTPATIENT
Start: 2023-03-07

## 2023-03-07 RX ORDER — FAMOTIDINE 20 MG/1
TABLET, FILM COATED ORAL
Qty: 30 TABLET | Refills: 0 | Status: CANCELLED | OUTPATIENT
Start: 2023-03-07

## 2023-03-07 RX ORDER — METOPROLOL TARTRATE 50 MG/1
50 TABLET, FILM COATED ORAL EVERY 12 HOURS SCHEDULED
Qty: 60 TABLET | Refills: 7 | Status: SHIPPED | OUTPATIENT
Start: 2023-03-07 | End: 2023-04-06

## 2023-03-07 RX ORDER — IBUPROFEN 800 MG/1
800 TABLET ORAL EVERY 8 HOURS PRN
Qty: 40 TABLET | Refills: 0 | Status: SHIPPED | OUTPATIENT
Start: 2023-03-07

## 2023-03-13 ENCOUNTER — APPOINTMENT (OUTPATIENT)
Dept: LAB | Facility: CLINIC | Age: 63
End: 2023-03-13

## 2023-03-13 DIAGNOSIS — E11.40 DIABETIC NEUROPATHY, PAINFUL (HCC): ICD-10-CM

## 2023-03-13 DIAGNOSIS — E11.69 TYPE 2 DIABETES MELLITUS WITH OTHER SPECIFIED COMPLICATION, WITHOUT LONG-TERM CURRENT USE OF INSULIN (HCC): ICD-10-CM

## 2023-03-13 LAB
CHOLEST SERPL-MCNC: 138 MG/DL
CREAT UR-MCNC: 251 MG/DL
EST. AVERAGE GLUCOSE BLD GHB EST-MCNC: 237 MG/DL
HBA1C MFR BLD: 9.9 %
HDLC SERPL-MCNC: 48 MG/DL
LDLC SERPL CALC-MCNC: 75 MG/DL (ref 0–100)
MICROALBUMIN UR-MCNC: 261 MG/L (ref 0–20)
MICROALBUMIN/CREAT 24H UR: 104 MG/G CREATININE (ref 0–30)
NONHDLC SERPL-MCNC: 90 MG/DL
TRIGL SERPL-MCNC: 74 MG/DL

## 2023-03-22 DIAGNOSIS — E11.40 TYPE 2 DIABETES MELLITUS WITH DIABETIC NEUROPATHY, WITHOUT LONG-TERM CURRENT USE OF INSULIN (HCC): ICD-10-CM

## 2023-03-25 ENCOUNTER — HOSPITAL ENCOUNTER (OUTPATIENT)
Dept: CT IMAGING | Facility: HOSPITAL | Age: 63
Discharge: HOME | End: 2023-03-25

## 2023-03-25 DIAGNOSIS — F17.211 NICOTINE DEPENDENCE, CIGARETTES, IN REMISSION: ICD-10-CM

## 2023-03-25 DIAGNOSIS — Z12.2 ENCOUNTER FOR SCREENING FOR LUNG CANCER: ICD-10-CM

## 2023-05-01 DIAGNOSIS — E11.40 TYPE 2 DIABETES MELLITUS WITH DIABETIC NEUROPATHY, WITHOUT LONG-TERM CURRENT USE OF INSULIN (HCC): ICD-10-CM

## 2023-05-01 DIAGNOSIS — G89.4 CHRONIC PAIN SYNDROME: ICD-10-CM

## 2023-05-01 RX ORDER — IBUPROFEN 800 MG/1
800 TABLET ORAL EVERY 8 HOURS PRN
Qty: 40 TABLET | Refills: 0 | Status: SHIPPED | OUTPATIENT
Start: 2023-05-01

## 2023-06-05 DIAGNOSIS — E11.40 TYPE 2 DIABETES MELLITUS WITH DIABETIC NEUROPATHY, WITHOUT LONG-TERM CURRENT USE OF INSULIN (HCC): ICD-10-CM

## 2023-07-03 DIAGNOSIS — E11.40 TYPE 2 DIABETES MELLITUS WITH DIABETIC NEUROPATHY, WITHOUT LONG-TERM CURRENT USE OF INSULIN (HCC): ICD-10-CM

## 2023-07-03 DIAGNOSIS — G89.4 CHRONIC PAIN SYNDROME: ICD-10-CM

## 2023-07-03 RX ORDER — IBUPROFEN 800 MG/1
800 TABLET ORAL EVERY 8 HOURS PRN
Qty: 40 TABLET | Refills: 0 | Status: SHIPPED | OUTPATIENT
Start: 2023-07-03

## 2023-07-11 ENCOUNTER — OFFICE VISIT (OUTPATIENT)
Dept: FAMILY MEDICINE CLINIC | Facility: CLINIC | Age: 63
End: 2023-07-11
Payer: COMMERCIAL

## 2023-07-11 VITALS
WEIGHT: 311 LBS | HEART RATE: 92 BPM | HEIGHT: 64 IN | TEMPERATURE: 99 F | OXYGEN SATURATION: 99 % | BODY MASS INDEX: 53.1 KG/M2 | SYSTOLIC BLOOD PRESSURE: 120 MMHG | DIASTOLIC BLOOD PRESSURE: 72 MMHG

## 2023-07-11 DIAGNOSIS — Z13.29 SCREENING FOR ENDOCRINE, METABOLIC AND IMMUNITY DISORDER: ICD-10-CM

## 2023-07-11 DIAGNOSIS — Z13.228 SCREENING FOR ENDOCRINE, METABOLIC AND IMMUNITY DISORDER: ICD-10-CM

## 2023-07-11 DIAGNOSIS — Z13.0 SCREENING FOR ENDOCRINE, METABOLIC AND IMMUNITY DISORDER: ICD-10-CM

## 2023-07-11 DIAGNOSIS — E11.40 DIABETIC NEUROPATHY, PAINFUL (HCC): ICD-10-CM

## 2023-07-11 DIAGNOSIS — E66.01 CLASS 3 SEVERE OBESITY DUE TO EXCESS CALORIES WITHOUT SERIOUS COMORBIDITY WITH BODY MASS INDEX (BMI) OF 50.0 TO 59.9 IN ADULT (HCC): ICD-10-CM

## 2023-07-11 DIAGNOSIS — I10 BENIGN ESSENTIAL HTN: ICD-10-CM

## 2023-07-11 DIAGNOSIS — E11.40 TYPE 2 DIABETES MELLITUS WITH DIABETIC NEUROPATHY, WITHOUT LONG-TERM CURRENT USE OF INSULIN (HCC): Primary | ICD-10-CM

## 2023-07-11 LAB — SL AMB POCT HEMOGLOBIN AIC: 7.1 (ref ?–6.5)

## 2023-07-11 PROCEDURE — 99213 OFFICE O/P EST LOW 20 MIN: CPT | Performed by: FAMILY MEDICINE

## 2023-07-11 PROCEDURE — 83036 HEMOGLOBIN GLYCOSYLATED A1C: CPT | Performed by: FAMILY MEDICINE

## 2023-07-11 NOTE — PROGRESS NOTES
Assessment/Plan:    No problem-specific Assessment & Plan notes found for this encounter. Diagnoses and all orders for this visit:    Type 2 diabetes mellitus with diabetic neuropathy, without long-term current use of insulin (HCC)  Comments:   A1c improving, kidney function normal, PT takes ACE  - RTC in 4 mo for DM  FUP and AWV  pt see podiatry. has an eye doct    Orders:  -     POCT hemoglobin A1c  -     HEMOGLOBIN A1C W/ EAG ESTIMATION; Future  -     metFORMIN (GLUCOPHAGE) 1000 MG tablet; Take 1 tablet (1,000 mg total) by mouth 2 (two) times a day with meals  -     dulaglutide (Trulicity) 1.5 QX/3.9II injection; Inject 0.5 mL (1.5 mg total) under the skin once a week for 4 doses    Benign essential HTN  Comments:  Toprol 50 mg Q12 and lisinopril 5 mg  Blood pressure well controlled, consider titrate down Toprol 50 mg daily instead of q 12 during next appointment    Diabetic neuropathy, painful ([address])  Comments:  Continue gabapentin, hemoglobin A1c improving    Class 3 severe obesity due to excess calories without serious comorbidity with body mass index (BMI) of 50.0 to 59.9 in adult [facility])  Comments:  discuss weight management during next appointment. Patient currently working does not have a time. Screening for endocrine, metabolic and immunity disorder  -     Comprehensive metabolic panel; Future  -     CBC and differential; Future  -     Lipid panel; Future  -     HEMOGLOBIN A1C W/ EAG ESTIMATION; Future  -     TSH, 3rd generation with Free T4 reflex; Future          Subjective:      Patient ID: Aviva Belcher is a 59 y.o. male. HPI  Patient is here for diabetes follow-up. He is hemoglobin A1c significantly improved 7.1 from 9.9. Patient is taking Trulicity and metformin. Reviewed lab results. Kidney function stable. Patient following up with podiatry Dr. Yohannes Honeycutt, and sees eye doctor independent provider. Regarding hypertension he is taking metoprolol 50 mg every 12 and lisinopril.   I started her lisinopril last time. The goal will be to down titrate metoprolol 50 mg daily and increase lisinopril for kidney protection. Patient's blood pressure is at goal  The following portions of the patient's history were reviewed and updated as appropriate: allergies, current medications, past family history, past medical history, past social history, past surgical history and problem list.    Review of Systems   Constitutional: Negative for chills and fever. HENT: Negative for ear pain and sore throat. Eyes: Negative for pain and visual disturbance. Respiratory: Negative for cough and shortness of breath. Cardiovascular: Negative for chest pain and palpitations. Gastrointestinal: Negative for abdominal pain and vomiting. Genitourinary: Negative for dysuria and hematuria. Musculoskeletal: Positive for arthralgias ( Hip pain bilateral secondary to osteoarthritis, chronic) and back pain ( Chronic). Skin: Negative for color change and rash. Neurological: Negative for seizures and syncope. All other systems reviewed and are negative. Objective:      /72   Pulse 92   Temp 99 °F (37.2 °C)   Ht 5' 4" (1.626 m)   Wt (!) 141 kg (311 lb)   SpO2 99%   BMI 53.38 kg/m²          Physical Exam  Vitals and nursing note reviewed. Constitutional:       General: He is not in acute distress. Appearance: He is obese. He is not toxic-appearing. HENT:      Head: Normocephalic and atraumatic. Eyes:      Extraocular Movements: Extraocular movements intact. Conjunctiva/sclera: Conjunctivae normal.   Cardiovascular:      Rate and Rhythm: Normal rate and regular rhythm. Pulses: Normal pulses. Heart sounds: Normal heart sounds. Pulmonary:      Effort: Pulmonary effort is normal.      Breath sounds: Normal breath sounds. Abdominal:      General: There is no distension. Skin:     General: Skin is warm. Capillary Refill: Capillary refill takes less than 2 seconds. Neurological:      General: No focal deficit present. Mental Status: He is alert and oriented to person, place, and time. Psychiatric:         Mood and Affect: Mood normal.         Thought Content:  Thought content normal.

## 2023-08-03 DIAGNOSIS — I10 BENIGN ESSENTIAL HTN: ICD-10-CM

## 2023-08-03 DIAGNOSIS — E11.40 TYPE 2 DIABETES MELLITUS WITH DIABETIC NEUROPATHY, WITHOUT LONG-TERM CURRENT USE OF INSULIN (HCC): ICD-10-CM

## 2023-08-03 DIAGNOSIS — G89.4 CHRONIC PAIN SYNDROME: ICD-10-CM

## 2023-08-03 RX ORDER — LISINOPRIL 5 MG/1
5 TABLET ORAL DAILY
Qty: 90 TABLET | Refills: 1 | Status: SHIPPED | OUTPATIENT
Start: 2023-08-03

## 2023-08-03 RX ORDER — IBUPROFEN 800 MG/1
800 TABLET ORAL EVERY 8 HOURS PRN
Qty: 40 TABLET | Refills: 0 | Status: SHIPPED | OUTPATIENT
Start: 2023-08-03

## 2023-08-04 DIAGNOSIS — I10 BENIGN ESSENTIAL HTN: ICD-10-CM

## 2023-08-04 DIAGNOSIS — K21.9 GASTROESOPHAGEAL REFLUX DISEASE, UNSPECIFIED WHETHER ESOPHAGITIS PRESENT: ICD-10-CM

## 2023-08-08 RX ORDER — FAMOTIDINE 20 MG/1
20 TABLET, FILM COATED ORAL 2 TIMES DAILY
Qty: 60 TABLET | Refills: 7 | Status: SHIPPED | OUTPATIENT
Start: 2023-08-08 | End: 2023-09-07

## 2023-08-08 RX ORDER — METOPROLOL TARTRATE 50 MG/1
50 TABLET, FILM COATED ORAL EVERY 12 HOURS SCHEDULED
Qty: 60 TABLET | Refills: 7 | Status: SHIPPED | OUTPATIENT
Start: 2023-08-08 | End: 2023-09-07

## 2023-08-29 DIAGNOSIS — E78.2 MIXED HYPERLIPIDEMIA: ICD-10-CM

## 2023-08-29 RX ORDER — ATORVASTATIN CALCIUM 80 MG/1
80 TABLET, FILM COATED ORAL DAILY
Qty: 90 TABLET | Refills: 1 | Status: SHIPPED | OUTPATIENT
Start: 2023-08-29

## 2023-10-02 DIAGNOSIS — G89.4 CHRONIC PAIN SYNDROME: ICD-10-CM

## 2023-10-02 RX ORDER — IBUPROFEN 800 MG/1
800 TABLET ORAL EVERY 8 HOURS PRN
Qty: 40 TABLET | Refills: 0 | Status: SHIPPED | OUTPATIENT
Start: 2023-10-02

## 2023-11-29 DIAGNOSIS — E11.40 TYPE 2 DIABETES MELLITUS WITH DIABETIC NEUROPATHY, WITHOUT LONG-TERM CURRENT USE OF INSULIN (HCC): ICD-10-CM

## 2023-11-29 DIAGNOSIS — G89.4 CHRONIC PAIN SYNDROME: ICD-10-CM

## 2023-11-29 RX ORDER — IBUPROFEN 800 MG/1
800 TABLET ORAL EVERY 8 HOURS PRN
Qty: 40 TABLET | Refills: 0 | Status: SHIPPED | OUTPATIENT
Start: 2023-11-29

## 2023-12-11 DIAGNOSIS — E11.40 TYPE 2 DIABETES MELLITUS WITH DIABETIC NEUROPATHY, WITHOUT LONG-TERM CURRENT USE OF INSULIN (HCC): ICD-10-CM

## 2023-12-11 NOTE — TELEPHONE ENCOUNTER
Pt called stating he wasn't able to  his Trulicity in November due to an issue with his insurance. He solved the insurance issue and went to  the script, but they were out of stock. He called and [address] has it in stock. Could you please re-send to [address]?

## 2023-12-26 ENCOUNTER — APPOINTMENT (EMERGENCY)
Dept: RADIOLOGY | Facility: HOSPITAL | Age: 63
End: 2023-12-26
Payer: COMMERCIAL

## 2023-12-26 ENCOUNTER — HOSPITAL ENCOUNTER (EMERGENCY)
Facility: HOSPITAL | Age: 63
Discharge: HOME | End: 2023-12-26
Attending: EMERGENCY MEDICINE
Payer: COMMERCIAL

## 2023-12-26 VITALS
OXYGEN SATURATION: 97 % | DIASTOLIC BLOOD PRESSURE: 78 MMHG | RESPIRATION RATE: 18 BRPM | WEIGHT: 315 LBS | SYSTOLIC BLOOD PRESSURE: 171 MMHG | HEART RATE: 84 BPM | BODY MASS INDEX: 54.07 KG/M2 | TEMPERATURE: 97.4 F

## 2023-12-26 DIAGNOSIS — M19.90 OSTEOARTHRITIS: ICD-10-CM

## 2023-12-26 DIAGNOSIS — M79.605 LEFT LEG PAIN: Primary | ICD-10-CM

## 2023-12-26 DIAGNOSIS — E83.42 HYPOMAGNESEMIA: ICD-10-CM

## 2023-12-26 LAB
ANION GAP SERPL CALCULATED.3IONS-SCNC: 12 MMOL/L
BUN SERPL-MCNC: 12 MG/DL (ref 5–25)
CALCIUM SERPL-MCNC: 9.2 MG/DL (ref 8.4–10.2)
CHLORIDE SERPL-SCNC: 106 MMOL/L (ref 96–108)
CK SERPL-CCNC: 64 U/L (ref 39–308)
CO2 SERPL-SCNC: 20 MMOL/L (ref 21–32)
CREAT SERPL-MCNC: 0.59 MG/DL (ref 0.6–1.3)
D DIMER PPP FEU-MCNC: 0.43 UG/ML FEU
GFR SERPL CREATININE-BSD FRML MDRD: 108 ML/MIN/1.73SQ M
GLUCOSE SERPL-MCNC: 111 MG/DL (ref 65–140)
MAGNESIUM SERPL-MCNC: 1.4 MG/DL (ref 1.9–2.7)
POTASSIUM SERPL-SCNC: 4.2 MMOL/L (ref 3.5–5.3)
SODIUM SERPL-SCNC: 138 MMOL/L (ref 135–147)

## 2023-12-26 PROCEDURE — 73564 X-RAY EXAM KNEE 4 OR MORE: CPT

## 2023-12-26 PROCEDURE — 85379 FIBRIN DEGRADATION QUANT: CPT | Performed by: EMERGENCY MEDICINE

## 2023-12-26 PROCEDURE — 73552 X-RAY EXAM OF FEMUR 2/>: CPT

## 2023-12-26 PROCEDURE — 82550 ASSAY OF CK (CPK): CPT | Performed by: EMERGENCY MEDICINE

## 2023-12-26 PROCEDURE — 73521 X-RAY EXAM HIPS BI 2 VIEWS: CPT

## 2023-12-26 PROCEDURE — 99285 EMERGENCY DEPT VISIT HI MDM: CPT | Performed by: EMERGENCY MEDICINE

## 2023-12-26 PROCEDURE — 36415 COLL VENOUS BLD VENIPUNCTURE: CPT | Performed by: EMERGENCY MEDICINE

## 2023-12-26 PROCEDURE — 99285 EMERGENCY DEPT VISIT HI MDM: CPT

## 2023-12-26 PROCEDURE — 73590 X-RAY EXAM OF LOWER LEG: CPT

## 2023-12-26 PROCEDURE — 80048 BASIC METABOLIC PNL TOTAL CA: CPT | Performed by: EMERGENCY MEDICINE

## 2023-12-26 PROCEDURE — 83735 ASSAY OF MAGNESIUM: CPT | Performed by: EMERGENCY MEDICINE

## 2023-12-26 RX ORDER — LANOLIN ALCOHOL/MO/W.PET/CERES
800 CREAM (GRAM) TOPICAL ONCE
Status: COMPLETED | OUTPATIENT
Start: 2023-12-26 | End: 2023-12-26

## 2023-12-26 RX ADMIN — Medication 800 MG: at 14:54

## 2023-12-26 NOTE — ED PROVIDER NOTES
History  Chief Complaint   Patient presents with    Leg Pain     Patient arrives with complaints of hip going down left leg pain. Was told few years ago would need hip replacement.      HPI      This is a very pleasant, nontoxic-appearing, 63-year-old gentleman presents emergency department with a longstanding history of chronic pain to his left leg and hip area.  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 Kocher, in the [facility] and released from the practice in Dec 2022, to failing a drug test narcotics.  Patient denies any recent trauma.  Patient reports he has generalized muscle aches, it is not on a statin.  Prior to Admission Medications   Prescriptions Last Dose Informant Patient Reported? Taking?   Blood Glucose Monitoring Suppl (OneTouch Verio Reflect) w/Device KIT   No No   Sig: Check blood sugars twice daily. Please substitute with appropriate alternative as covered by patient's insurance. Dx: E11.65   OneTouch Delica Lancets 33G MISC   No No   Sig: Check blood sugars twice daily. Please substitute with appropriate alternative as covered by patient's insurance. Dx: E11.65   aspirin 81 MG tablet   No No   Sig: Take 1 tablet (81 mg total) by mouth daily   atorvastatin (LIPITOR) 80 mg tablet   No No   Sig: take 1 tablet by mouth once daily   cyclobenzaprine (FLEXERIL) 10 mg tablet   No No   Sig: Take 1 tablet (10 mg total) by mouth 3 (three) times a day as needed for muscle spasms   Patient not taking: Reported on 3/7/2023   dulaglutide (Trulicity) 1.5 MG/0.5ML injection   No No   Sig: Inject 0.5 mL (1.5 mg total) under the skin once a week for 4 doses   famotidine (PEPCID) 20 mg tablet   No No   Sig: Take 1 tablet (20 mg total) by mouth 2 (two) times a day   gabapentin (NEURONTIN) 300 mg capsule   No No   Sig: take 1 capsule by mouth three times a day   glucose blood (OneTouch Verio) test strip    No No   Sig: Check blood sugars twice daily. Please substitute with appropriate alternative as covered by patient's insurance. Dx: E11.65   glucose monitoring kit (FREESTYLE) monitoring kit   No No   Sig: Use 1 each 2 (two) times a day   ibuprofen (MOTRIN) 800 mg tablet   No No   Sig: Take 1 tablet (800 mg total) by mouth every 8 (eight) hours as needed for mild pain, moderate pain, fever or headaches   lisinopril (ZESTRIL) 5 mg tablet   No No   Sig: Take 1 tablet (5 mg total) by mouth daily   metFORMIN (GLUCOPHAGE) 1000 MG tablet   No No   Sig: Take 1 tablet (1,000 mg total) by mouth 2 (two) times a day with meals   metoprolol tartrate (LOPRESSOR) 50 mg tablet   No No   Sig: Take 1 tablet (50 mg total) by mouth every 12 (twelve) hours      Facility-Administered Medications: None       Past Medical History:   Diagnosis Date    CAD (coronary artery disease)     s/p CABG x 2 LIMA-LAD, VG- OM1 8/5/2013    Carotid duplex 08/05/2013    20-49% bilateral stenosis    Diabetes (HCC)     History of echocardiogram 12/22/2016    EF 55% Mild LVH. Mild MR    Hyperlipidemia     Hypertension        Past Surgical History:   Procedure Laterality Date    CARDIAC CATHETERIZATION  08/02/2013    EF 50% Severe left main disease 90%, OM1 99%, 100% RCA (bypass scheduled)    CORONARY ARTERY BYPASS GRAFT  08/05/2013    CABG X2 LIMA-LAD, VG-OM1    WOUND DEBRIDEMENT Right 8/12/2020    Procedure: EXCISIONAL DEBRIDEMENT Right pannus/groin;  Surgeon: Anita Mccord MD;  Location: [facility];  Service: Trauma    WOUND DEBRIDEMENT Right 8/13/2020    Procedure: EXCISIONAL DEBRIDEMENT, placement of drain and closure of wound;  Surgeon: Cornelius Bond DO;  Location: [facility];  Service: General       Family History   Problem Relation Age of Onset    Diabetes Mother     Heart disease Mother     Stroke Mother     Obesity Mother     Diabetes Brother     Heart disease Brother     Obesity Brother     Cancer Neg Hx      I have reviewed and agree with  the history as documented.    E-Cigarette/Vaping    E-Cigarette Use Never User      E-Cigarette/Vaping Substances     Social History     Tobacco Use    Smoking status: Former     Current packs/day: 0.00     Average packs/day: 2.0 packs/day for 30.0 years (60.0 ttl pk-yrs)     Types: Cigarettes     Start date:      Quit date: 2010     Years since quittin.9    Smokeless tobacco: Former   Vaping Use    Vaping status: Never Used   Substance Use Topics    Alcohol use: Not Currently    Drug use: Not Currently       Review of Systems   Constitutional: Negative.    HENT: Negative.     Eyes: Negative.    Respiratory: Negative.  Negative for chest tightness and shortness of breath.    Cardiovascular: Negative.  Negative for chest pain.   Gastrointestinal: Negative.  Negative for abdominal pain.   Endocrine: Negative.    Genitourinary: Negative.    Musculoskeletal: Negative.    Skin: Negative.    Allergic/Immunologic: Negative.    Neurological: Negative.    Hematological: Negative.        Physical Exam  Physical Exam  Vitals and nursing note reviewed.   Constitutional:       Appearance: Normal appearance.   HENT:      Head: Normocephalic and atraumatic.      Right Ear: External ear normal.      Left Ear: External ear normal.      Nose: Nose normal.      Mouth/Throat:      Mouth: Mucous membranes are moist.      Pharynx: Oropharynx is clear.   Eyes:      Extraocular Movements: Extraocular movements intact.      Conjunctiva/sclera: Conjunctivae normal.      Pupils: Pupils are equal, round, and reactive to light.   Cardiovascular:      Rate and Rhythm: Normal rate.   Abdominal:      General: Abdomen is flat. Bowel sounds are normal.   Musculoskeletal:         General: Tenderness present. No swelling, deformity or signs of injury.      Cervical back: Normal range of motion.      Right lower leg: No edema.      Left lower leg: No edema.        Legs:       Comments: No induration or erythema on the left leg to suggest an  infection, no puncture wound, no outward signs of trauma no contusions noted.   Neurological:      Mental Status: He is alert.         Vital Signs  ED Triage Vitals [12/26/23 1049]   Temperature Pulse Respirations Blood Pressure SpO2   (!) 97.4 °F (36.3 °C) 84 18 (!) 171/78 97 %      Temp Source Heart Rate Source Patient Position - Orthostatic VS BP Location FiO2 (%)   Temporal Monitor -- -- --      Pain Score       5           Vitals:    12/26/23 1049   BP: (!) 171/78   Pulse: 84         Visual Acuity      ED Medications  Medications   magnesium Oxide (MAG-OX) tablet 800 mg (800 mg Oral Given 12/26/23 1454)       Diagnostic Studies  Results Reviewed       Procedure Component Value Units Date/Time    D-Dimer [[id number]]  (Normal) Collected: 12/26/23 1403    Lab Status: Final result Specimen: Blood from Arm, Left Updated: 12/26/23 1422     D-Dimer, Quant 0.43 ug/ml FEU     Narrative:      In the evaluation for possible pulmonary embolism, in the appropriate (Well's Score of 4 or less) patient, the age adjusted d-dimer cutoff for this patient can be calculated as:    Age x 0.01 (in ug/mL) for Age-adjusted D-dimer exclusion threshold for a patient over 50 years.    Basic metabolic panel [[id number]]  (Abnormal) Collected: 12/26/23 1301    Lab Status: Final result Specimen: Blood from Hand, Left Updated: 12/26/23 1352     Sodium 138 mmol/L      Potassium 4.2 mmol/L      Chloride 106 mmol/L      CO2 20 mmol/L      ANION GAP 12 mmol/L      BUN 12 mg/dL      Creatinine 0.59 mg/dL      Glucose 111 mg/dL      Calcium 9.2 mg/dL      eGFR 108 ml/min/1.73sq m     Narrative:      National Kidney Disease Foundation guidelines for Chronic Kidney Disease (CKD):     Stage 1 with normal or high GFR (GFR > 90 mL/min/1.73 square meters)    Stage 2 Mild CKD (GFR = 60-89 mL/min/1.73 square meters)    Stage 3A Moderate CKD (GFR = 45-59 mL/min/1.73 square meters)    Stage 3B Moderate CKD (GFR = 30-44 mL/min/1.73 square meters)    Stage 4  Severe CKD (GFR = 15-29 mL/min/1.73 square meters)    Stage 5 End Stage CKD (GFR <15 mL/min/1.73 square meters)  Note: GFR calculation is accurate only with a steady state creatinine    CK [[id number]]  (Normal) Collected: 12/26/23 1301    Lab Status: Final result Specimen: Blood from Hand, Left Updated: 12/26/23 1352     Total CK 64 U/L     Magnesium [[id number]]  (Abnormal) Collected: 12/26/23 1301    Lab Status: Final result Specimen: Blood from Hand, Left Updated: 12/26/23 1352     Magnesium 1.4 mg/dL                    XR hips bilateral with ap pelvis 2 vw   Final Result by Omar Medina MD (12/26 1346)      Osteoarthritis of the hips, left side severe and right side mild.      Severe atherosclerotic disease is noted as well.            Workstation performed: [id number]         XR femur 2 views LEFT   Final Result by Omar Medina MD (12/26 1344)      Severe osteoarthritis of the hip. Severe atherosclerotic disease.            Workstation performed: [id number]         XR knee 4+ views left injury   Final Result by Omar Medina MD (12/26 1346)      No acute osseous abnormality.            Workstation performed: [id number]         XR tibia fibula 2 views LEFT   Final Result by Omar Medina MD (12/26 1348)      No acute osseous abnormality.            Workstation performed: [id number]                    Procedures  Procedures         ED Course  ED Course as of 12/26/23 1546   Tue Dec 26, 2023   1144 Patient seen evaluated orders placed, chronic pain over the last 6 months, confined to the left side, no back pain, has mid thigh pain.  No induration erythema to suggest cellulitis or vascular changes.  Patient has intact distal peripheral pulses is on cholesterol medication.    Focused differential diagnosis in this patient is as follows: Logic fracture versus fracture versus medication reaction related to anticholesterol medications, rhabdomyolysis versus electrolyte  abnormalities.   1506 Patient able to ambulate w/ canes without difficulty.                               SBIRT 22yo+      Flowsheet Row Most Recent Value   Initial Alcohol Screen: US AUDIT-C     1. How often do you have a drink containing alcohol? 0 Filed at: 12/26/2023 1050   2. How many drinks containing alcohol do you have on a typical day you are drinking?  0 Filed at: 12/26/2023 1050   3a. Male UNDER 65: How often do you have five or more drinks on one occasion? 0 Filed at: 12/26/2023 1050   3b. FEMALE Any Age, or MALE 65+: How often do you have 4 or more drinks on one occassion? 0 Filed at: 12/26/2023 1050   Audit-C Score 0 Filed at: 12/26/2023 1050   MALIKA: How many times in the past year have you...    Used an illegal drug or used a prescription medication for non-medical reasons? Never Filed at: 12/26/2023 1050              Wells' Criteria for DVT      Flowsheet Row Most Recent Value   Wells' Criteria for DVT    Active cancer Treatment or palliation within 6 months 0 Filed at: 12/26/2023 1423   Bedridden recently >3 days or major surgery within 12 weeks 0 Filed at: 12/26/2023 1423   Calf swelling >3 cm compared to the other leg 0 Filed at: 12/26/2023 1423   Entire leg swollen 0 Filed at: 12/26/2023 1423   Collateral (nonvaricose) superficial veins present 0 Filed at: 12/26/2023 1423   Localized tenderness along the deep venous system 0 Filed at: 12/26/2023 1423   Pitting edema, confined to symptomatic leg 0 Filed at: 12/26/2023 1423   Paralysis, paresis, or recent plaster immobilization of the lower extremity 0 Filed at: 12/26/2023 1423   Previously documented DVT 0 Filed at: 12/26/2023 1423   Alternative diagnosis to DVT as likely or more likely 0 Filed at: 12/26/2023 1423   Douglas DVT Critera Score 0 Filed at: 12/26/2023 1423                Medical Decision Making  Magnesium level noted to be low, could be a confounding variable given the patient's weight and occupation history of chronic microtrauma from  "driving a large truck, has significant osteoarthritic changes noted throughout his imaging, follow-up with orthopedics.    Portions of the record may have been created with voice recognition software. Occasional wrong word or \"sound a like\" substitutions may have occurred due to the inherent limitations of voice recognition software. Read the chart carefully and recognize, using context, where substitutions have occurred.       Counseling: I had a detailed discussion with the patient and/or guardian regarding: the historical points, exam findings, and any diagnostic results supporting the discharge diagnosis, lab results, radiology results, discharge instructions reviewed with patient and/or family/caregiver and understanding was verbalized. Instructions given to return to the emergency department if symptoms worsen or persist, or if there are any questions or concerns that arise at home.        Amount and/or Complexity of Data Reviewed  Labs: ordered.  Radiology: ordered.    Risk  OTC drugs.             Disposition  Final diagnoses:   Left leg pain   Hypomagnesemia   Osteoarthritis     Time reflects when diagnosis was documented in both MDM as applicable and the Disposition within this note       Time User Action Codes Description Comment    12/26/2023  2:23 PM Barrett Luther [M79.605] Left leg pain     12/26/2023  2:23 PM Barrett Luther [E83.42] Hypomagnesemia     12/26/2023  2:24 PM Barrett Luther [M19.90] Osteoarthritis           ED Disposition       ED Disposition   Discharge    Condition   Stable    Date/Time   Tue Dec 26, 2023 1423    Comment   Devendra FAYE Urena discharge to home/self care.                   Follow-up Information       Follow up With Specialties Details Why Contact Info    Willie Topete DO Family [address] [address]  [phone]              Discharge Medication List as of 12/26/2023  3:06 PM        CONTINUE these medications which have NOT CHANGED    " Details   aspirin 81 MG tablet Take 1 tablet (81 mg total) by mouth daily, Starting Tue 10/22/2019, No Print      atorvastatin (LIPITOR) 80 mg tablet take 1 tablet by mouth once daily, Starting Tue 8/29/2023, Normal      Blood Glucose Monitoring Suppl (OneTouch Verio Reflect) w/Device KIT Check blood sugars twice daily. Please substitute with appropriate alternative as covered by patient's insurance. Dx: E11.65, Normal      cyclobenzaprine (FLEXERIL) 10 mg tablet Take 1 tablet (10 mg total) by mouth 3 (three) times a day as needed for muscle spasms, Starting Sun 8/2/2020, Normal      dulaglutide (Trulicity) 1.5 MG/0.5ML injection Inject 0.5 mL (1.5 mg total) under the skin once a week for 4 doses, Starting Mon 12/11/2023, Until Tue 1/2/2024, Normal      famotidine (PEPCID) 20 mg tablet Take 1 tablet (20 mg total) by mouth 2 (two) times a day, Starting Tue 8/8/2023, Until Thu 9/7/2023, Normal      gabapentin (NEURONTIN) 300 mg capsule take 1 capsule by mouth three times a day, Normal      glucose blood (OneTouch Verio) test strip Check blood sugars twice daily. Please substitute with appropriate alternative as covered by patient's insurance. Dx: E11.65, Normal      glucose monitoring kit (FREESTYLE) monitoring kit Use 1 each 2 (two) times a day, Starting Tue 10/25/2022, Normal      ibuprofen (MOTRIN) 800 mg tablet Take 1 tablet (800 mg total) by mouth every 8 (eight) hours as needed for mild pain, moderate pain, fever or headaches, Starting Wed 11/29/2023, Normal      lisinopril (ZESTRIL) 5 mg tablet Take 1 tablet (5 mg total) by mouth daily, Starting Thu 8/3/2023, Normal      metFORMIN (GLUCOPHAGE) 1000 MG tablet Take 1 tablet (1,000 mg total) by mouth 2 (two) times a day with meals, Starting Thu 8/3/2023, Normal      metoprolol tartrate (LOPRESSOR) 50 mg tablet Take 1 tablet (50 mg total) by mouth every 12 (twelve) hours, Starting Tue 8/8/2023, Until Thu 9/7/2023, Normal      OneTouch Delica Lancets 33G MISC Check  blood sugars twice daily. Please substitute with appropriate alternative as covered by patient's insurance. Dx: E11.65, Normal                 PDMP Review         Value Time User    PDMP Reviewed  Yes 12/26/2023 11:40 AM Barrett Luther III, DO            ED Provider  Electronically Signed by             Barrett Luther III, DO  12/26/23 1543

## 2023-12-27 ENCOUNTER — VBI (OUTPATIENT)
Dept: FAMILY MEDICINE CLINIC | Facility: CLINIC | Age: 63
End: 2023-12-27

## 2023-12-27 DIAGNOSIS — G89.4 CHRONIC PAIN SYNDROME: ICD-10-CM

## 2023-12-27 RX ORDER — IBUPROFEN 800 MG/1
800 TABLET ORAL EVERY 8 HOURS PRN
Qty: 40 TABLET | Refills: 0 | Status: SHIPPED | OUTPATIENT
Start: 2023-12-27

## 2023-12-27 NOTE — TELEPHONE ENCOUNTER
12/27/23 9:37 AM    Patient contacted post ED visit, [facility] department spoke with patient/caregiver and outreach was successful.    Thank you.  Momo Santo MA  [facility]

## 2023-12-27 NOTE — TELEPHONE ENCOUNTER
Patient was in the ER yesterday.  His electrolytes were down.  They gave him a magnesium oxide tablet.  He is asking if he can have a refill for this to [facility].  He has a scheduled appointment with you on January 9, 2024.

## 2024-01-05 DIAGNOSIS — E11.40 TYPE 2 DIABETES MELLITUS WITH DIABETIC NEUROPATHY, WITHOUT LONG-TERM CURRENT USE OF INSULIN (HCC): ICD-10-CM

## 2024-01-09 ENCOUNTER — OFFICE VISIT (OUTPATIENT)
Dept: FAMILY MEDICINE CLINIC | Facility: CLINIC | Age: 64
End: 2024-01-09
Payer: COMMERCIAL

## 2024-01-09 VITALS
DIASTOLIC BLOOD PRESSURE: 70 MMHG | SYSTOLIC BLOOD PRESSURE: 124 MMHG | OXYGEN SATURATION: 96 % | BODY MASS INDEX: 53.78 KG/M2 | WEIGHT: 315 LBS | HEART RATE: 83 BPM | HEIGHT: 64 IN | TEMPERATURE: 98.9 F

## 2024-01-09 DIAGNOSIS — I10 BENIGN ESSENTIAL HTN: ICD-10-CM

## 2024-01-09 DIAGNOSIS — E11.40 DIABETIC NEUROPATHY, PAINFUL (HCC): ICD-10-CM

## 2024-01-09 DIAGNOSIS — G89.29 CHRONIC PAIN OF LEFT KNEE: ICD-10-CM

## 2024-01-09 DIAGNOSIS — E66.01 CLASS 3 SEVERE OBESITY DUE TO EXCESS CALORIES WITHOUT SERIOUS COMORBIDITY WITH BODY MASS INDEX (BMI) OF 50.0 TO 59.9 IN ADULT (HCC): ICD-10-CM

## 2024-01-09 DIAGNOSIS — Z00.00 ANNUAL PHYSICAL EXAM: Primary | ICD-10-CM

## 2024-01-09 DIAGNOSIS — E11.69 TYPE 2 DIABETES MELLITUS WITH OTHER SPECIFIED COMPLICATION, WITHOUT LONG-TERM CURRENT USE OF INSULIN (HCC): ICD-10-CM

## 2024-01-09 DIAGNOSIS — M25.562 CHRONIC PAIN OF LEFT KNEE: ICD-10-CM

## 2024-01-09 DIAGNOSIS — Z23 ENCOUNTER FOR IMMUNIZATION: ICD-10-CM

## 2024-01-09 DIAGNOSIS — Z12.11 SCREENING FOR COLON CANCER: ICD-10-CM

## 2024-01-09 DIAGNOSIS — Z12.5 SCREENING FOR PROSTATE CANCER: ICD-10-CM

## 2024-01-09 LAB
LEFT EYE DIABETIC RETINOPATHY: NORMAL
LEFT EYE IMAGE QUALITY: NORMAL
LEFT EYE MACULAR EDEMA: NORMAL
LEFT EYE OTHER RETINOPATHY: NORMAL
RIGHT EYE DIABETIC RETINOPATHY: NORMAL
RIGHT EYE IMAGE QUALITY: NORMAL
RIGHT EYE MACULAR EDEMA: NORMAL
RIGHT EYE OTHER RETINOPATHY: NORMAL
SEVERITY (EYE EXAM): NORMAL
SL AMB POCT HEMOGLOBIN AIC: 6.8 (ref ?–6.5)

## 2024-01-09 PROCEDURE — 90471 IMMUNIZATION ADMIN: CPT

## 2024-01-09 PROCEDURE — 83036 HEMOGLOBIN GLYCOSYLATED A1C: CPT

## 2024-01-09 PROCEDURE — 92250 FUNDUS PHOTOGRAPHY W/I&R: CPT

## 2024-01-09 PROCEDURE — 99213 OFFICE O/P EST LOW 20 MIN: CPT

## 2024-01-09 PROCEDURE — G0439 PPPS, SUBSEQ VISIT: HCPCS

## 2024-01-09 PROCEDURE — 90686 IIV4 VACC NO PRSV 0.5 ML IM: CPT

## 2024-01-09 NOTE — PROGRESS NOTES
ADULT ANNUAL PHYSICAL  [facility] PRACTICE    NAME: Devendra Urena  AGE: 63 y.o. SEX: male  : 1960     DATE: 1/10/2024     Assessment and Plan:     Problem List Items Addressed This Visit          Endocrine    Diabetic neuropathy, painful (HCC)    Relevant Medications    dulaglutide (Trulicity) 3 MG/0.5ML injection    Other Relevant Orders    IRIS Diabetic eye exam (Completed)    POCT hemoglobin A1c (Completed)    Albumin / creatinine urine ratio       Cardiovascular and Mediastinum    Benign essential HTN     Other Visit Diagnoses       Annual physical exam    -  Primary    Labs were not done  Patient to do the blood work and I will follow-up with him accordingly    Encounter for immunization        Relevant Orders    influenza vaccine, quadrivalent, 0.5 mL, preservative-free, for adult and pediatric patients 6 mos+ (AFLURIA, FLUARIX, FLULAVAL, FLUZONE) (Completed)    Screening for colon cancer        Relevant Orders    Cologuard    Screening for prostate cancer        Relevant Orders    PSA, Total Screen    Type 2 diabetes mellitus with other specified complication, without long-term current use of insulin ([facility])        a1c 6.8 improved from 7.1  Continue metformin and Trulicity which I increase the dosage to 3 mg at this time  Hopefully insurance will cover    Relevant Medications    dulaglutide (Trulicity) 3 MG/0.5ML injection    Other Relevant Orders    IRIS Diabetic eye exam (Completed)    POCT hemoglobin A1c (Completed)    Albumin / creatinine urine ratio    Chronic pain of left knee        Knee pain most likely osteoarthritis    Class 3 severe obesity due to excess calories without serious comorbidity with body mass index (BMI) of 50.0 to 59.9 in adult (HCC)        Started Trulicity then it was discontinued due to insurance problems.  When he did Trulicity no side effects.  I increased the dosage to 3 mg    Relevant Medications    dulaglutide  (Trulicity) 3 MG/0.5ML injection        Patient is here for annual physical exam he did not do the blood work which was ordered last year 2023.  Asked patient to do the blood work so I can follow-up with him accordingly    He did CT lung 2023 please see the results.  Patient refused to do colonoscopy he would like to do Cologuard.  Additionally patient reports that he has a pain on his left hip and knee.  I reviewed with him his hip x-ray which showed severe osteoarthritis.  Discussed with patient that he needs to see Ortho orthopedic doctor for further evaluation and management.  We discussed with patient that his BMI significantly high 55.41.  Discussed with patient with these BMI surgeons will not be able to do procedure.  Patient does not want to see weight management.  He would like to do to Trulicity and keep an eye on his diet.  Unfortunately he cannot do exercise due to weight and hip pain  Follow-up in 2 months for weight management\         Ct lungs 3/25/2023  MPRESSION:     No nodules and/or definitely benign nodules.       Lung-RADS1, negative. Continued annual screening with LDCT in 12 months.         Patient took Trulicity with out  SE, call insurance     Immunizations and preventive care screenings were discussed with patient today. Appropriate education was printed on patient's after visit summary.    Discussed risks and benefits of prostate cancer screening. We discussed the controversial history of PSA screening for prostate cancer in the [place] as well as the risk of over detection and over treatment of prostate cancer by way of PSA screening.  The patient understands that PSA blood testing is an imperfect way to screen for prostate cancer and that elevated PSA levels in the blood may also be caused by infection, inflammation, prostatic trauma or manipulation, urological procedures, or by benign prostatic enlargement.    The role of the digital rectal examination in prostate cancer  screening was also discussed and I discussed with him that there is large interobserver variability in the findings of digital rectal examination.    Counseling:  Alcohol/drug use: discussed moderation in alcohol intake, the recommendations for healthy alcohol use, and avoidance of illicit drug use.  Injury prevention: discussed safety/seat belts, safety helmets, smoke detectors, carbon dioxide detectors, and smoking near bedding or upholstery.  Exercise: the importance of regular exercise/physical activity was discussed. Recommend exercise 3-5 times per week for at least 30 minutes.       Depression Screening and Follow-up Plan: Patient was screened for depression during today's encounter. They screened negative with a PHQ-2 score of 0.        Return for Recheck weight .     Chief Complaint:     Chief Complaint   Patient presents with    Physical Exam     Annual. Trulicity needs a prior auth so wasn't able to start medication. Had xrays done at ER last week for leg pain. Would like to discuss xrays.      History of Present Illness:     Adult Annual Physical   Patient here for a comprehensive physical exam. The patient reports problems - left hip pain secondary to osteoarthritis patient is to follow-up with chronic pain management unfortunately last year they discharged him from the clinic .    Diet and Physical Activity  Diet/Nutrition: poor diet.   Exercise: no formal exercise.      Depression Screening  PHQ-2/9 Depression Screening    Little interest or pleasure in doing things: 0 - not at all  Feeling down, depressed, or hopeless: 0 - not at all  PHQ-2 Score: 0  PHQ-2 Interpretation: Negative depression screen       General Health  Sleep: gets 7-8 hours of sleep on average.   Hearing: normal - bilateral.  Vision: no vision problems.   Dental: no dental visits for >1 year and brushes teeth once daily.        Health  Symptoms include: none    Advanced Care Planning  Do you have an advanced directive? no  Do you  have a durable medical power of ? no     Review of Systems:     Review of Systems   Constitutional:  Negative for chills and fever.   HENT:  Negative for ear pain and sore throat.    Eyes:  Negative for pain and visual disturbance.   Respiratory:  Negative for cough and shortness of breath.    Cardiovascular:  Negative for chest pain and palpitations.   Gastrointestinal:  Negative for vomiting.   Genitourinary:  Negative for dysuria and hematuria.   Musculoskeletal:  Positive for arthralgias and back pain.   Skin:  Negative for color change and rash.   Neurological:  Negative for seizures and syncope.   All other systems reviewed and are negative.     Past Medical History:     Past Medical History:   Diagnosis Date    CAD (coronary artery disease)     s/p CABG x 2 LIMA-LAD, VG- OM1 8/5/2013    Carotid duplex 08/05/2013    20-49% bilateral stenosis    Diabetes (HCC)     History of echocardiogram 12/22/2016    EF 55% Mild LVH. Mild MR    Hyperlipidemia     Hypertension       Past Surgical History:     Past Surgical History:   Procedure Laterality Date    CARDIAC CATHETERIZATION  08/02/2013    EF 50% Severe left main disease 90%, OM1 99%, 100% RCA (bypass scheduled)    CORONARY ARTERY BYPASS GRAFT  08/05/2013    CABG X2 LIMA-LAD, VG-OM1    WOUND DEBRIDEMENT Right 8/12/2020    Procedure: EXCISIONAL DEBRIDEMENT Right pannus/groin;  Surgeon: Anita Mccord MD;  Location: [facility];  Service: Trauma    WOUND DEBRIDEMENT Right 8/13/2020    Procedure: EXCISIONAL DEBRIDEMENT, placement of drain and closure of wound;  Surgeon: Cornelius Bnod DO;  Location: [facility];  Service: General      Family History:     Family History   Problem Relation Age of Onset    Diabetes Mother     Heart disease Mother     Stroke Mother     Obesity Mother     Diabetes Brother     Heart disease Brother     Obesity Brother     Cancer Neg Hx       Social History:     Social History     Socioeconomic History    Marital status:  /Civil Union     Spouse name: None    Number of children: None    Years of education: None    Highest education level: None   Occupational History    None   Tobacco Use    Smoking status: Former     Current packs/day: 0.00     Average packs/day: 2.0 packs/day for 30.0 years (60.0 ttl pk-yrs)     Types: Cigarettes     Start date:      Quit date:      Years since quittin.0    Smokeless tobacco: Former   Vaping Use    Vaping status: Never Used   Substance and Sexual Activity    Alcohol use: Not Currently    Drug use: Not Currently    Sexual activity: None   Other Topics Concern    None   Social History Narrative    None     Social Determinants of Health     Financial Resource Strain: Not on file   Food Insecurity: Not on file   Transportation Needs: Not on file   Physical Activity: Not on file   Stress: Not on file   Social Connections: Not on file   Intimate Partner Violence: Not on file   Housing Stability: Not on file      Current Medications:     Current Outpatient Medications   Medication Sig Dispense Refill    aspirin 81 MG tablet Take 1 tablet (81 mg total) by mouth daily      atorvastatin (LIPITOR) 80 mg tablet take 1 tablet by mouth once daily 90 tablet 1    dulaglutide (Trulicity) 3 MG/0.5ML injection Inject 0.5 mL (3 mg total) under the skin every 7 days 6 mL 1    famotidine (PEPCID) 20 mg tablet Take 1 tablet (20 mg total) by mouth 2 (two) times a day 60 tablet 7    glucose blood (OneTouch Verio) test strip Check blood sugars twice daily. Please substitute with appropriate alternative as covered by patient's insurance. Dx: E11.65 200 each 3    glucose monitoring kit (FREESTYLE) monitoring kit Use 1 each 2 (two) times a day 1 each 0    ibuprofen (MOTRIN) 800 mg tablet Take 1 tablet (800 mg total) by mouth every 8 (eight) hours as needed for mild pain, moderate pain, fever or headaches 40 tablet 0    lisinopril (ZESTRIL) 5 mg tablet Take 1 tablet (5 mg total) by mouth daily 90 tablet 1     "metFORMIN (GLUCOPHAGE) 1000 MG tablet Take 1 tablet (1,000 mg total) by mouth 2 (two) times a day with meals 60 tablet 5    metoprolol tartrate (LOPRESSOR) 50 mg tablet Take 1 tablet (50 mg total) by mouth every 12 (twelve) hours 60 tablet 7    OneTouch Delica Lancets 33G MISC Check blood sugars twice daily. Please substitute with appropriate alternative as covered by patient's insurance. Dx: E11.65 200 each 3    Blood Glucose Monitoring Suppl (OneTouch Verio Reflect) w/Device KIT Check blood sugars twice daily. Please substitute with appropriate alternative as covered by patient's insurance. Dx: E11.65 (Patient not taking: Reported on 1/9/2024) 1 kit 0    cyclobenzaprine (FLEXERIL) 10 mg tablet Take 1 tablet (10 mg total) by mouth 3 (three) times a day as needed for muscle spasms (Patient not taking: Reported on 3/7/2023) 30 tablet 0    gabapentin (NEURONTIN) 300 mg capsule take 1 capsule by mouth three times a day (Patient not taking: Reported on 1/9/2024) 90 capsule 2     No current facility-administered medications for this visit.      Allergies:     No Known Allergies   Physical Exam:     /70 (BP Location: Left arm, Patient Position: Sitting)   Pulse 83   Temp 98.9 °F (37.2 °C) (Tympanic)   Ht 5' 4\" (1.626 m)   Wt (!) 146 kg (322 lb 12.8 oz)   SpO2 96%   BMI 55.41 kg/m²     Physical Exam  Vitals and nursing note reviewed.   Constitutional:       General: He is not in acute distress.     Appearance: He is well-developed. He is obese.   HENT:      Head: Normocephalic and atraumatic.      Mouth/Throat:      Mouth: Mucous membranes are moist.   Eyes:      Extraocular Movements: Extraocular movements intact.      Conjunctiva/sclera: Conjunctivae normal.   Cardiovascular:      Rate and Rhythm: Normal rate and regular rhythm.      Heart sounds: No murmur heard.  Pulmonary:      Effort: Pulmonary effort is normal. No respiratory distress.      Breath sounds: Normal breath sounds.   Abdominal:      General: " Abdomen is flat. Bowel sounds are normal.      Palpations: Abdomen is soft.   Musculoskeletal:         General: No swelling.      Cervical back: Neck supple.   Skin:     General: Skin is warm and dry.      Capillary Refill: Capillary refill takes less than 2 seconds.   Neurological:      Mental Status: He is alert.   Psychiatric:         Mood and Affect: Mood normal.         Thought Content: Thought content normal.         Judgment: Judgment normal.          Dariana Salmeron MD  [facility]

## 2024-01-10 ENCOUNTER — APPOINTMENT (OUTPATIENT)
Dept: LAB | Facility: CLINIC | Age: 64
End: 2024-01-10
Payer: COMMERCIAL

## 2024-01-10 DIAGNOSIS — Z12.5 SCREENING FOR PROSTATE CANCER: ICD-10-CM

## 2024-01-10 DIAGNOSIS — E11.40 DIABETIC NEUROPATHY, PAINFUL (HCC): ICD-10-CM

## 2024-01-10 DIAGNOSIS — E11.69 TYPE 2 DIABETES MELLITUS WITH OTHER SPECIFIED COMPLICATION, WITHOUT LONG-TERM CURRENT USE OF INSULIN (HCC): ICD-10-CM

## 2024-01-10 LAB
CREAT UR-MCNC: 162.4 MG/DL
MICROALBUMIN UR-MCNC: 111.1 MG/L
MICROALBUMIN/CREAT 24H UR: 68 MG/G CREATININE (ref 0–30)
PSA SERPL-MCNC: 1.24 NG/ML (ref 0–4)

## 2024-01-10 PROCEDURE — 82570 ASSAY OF URINE CREATININE: CPT

## 2024-01-10 PROCEDURE — 36415 COLL VENOUS BLD VENIPUNCTURE: CPT

## 2024-01-10 PROCEDURE — 82043 UR ALBUMIN QUANTITATIVE: CPT

## 2024-01-10 PROCEDURE — G0103 PSA SCREENING: HCPCS

## 2024-01-15 ENCOUNTER — TELEPHONE (OUTPATIENT)
Dept: FAMILY MEDICINE CLINIC | Facility: CLINIC | Age: 64
End: 2024-01-15

## 2024-01-15 NOTE — TELEPHONE ENCOUNTER
Patient called and is not feeling well without his Trulicity refill.  Do you want to approve this for prior authorization?

## 2024-01-19 NOTE — TELEPHONE ENCOUNTER
Called [employer] to check status of Prior Auth ( [phone])    Left message for prior auth clinical department after being on hold for 20 minutes

## 2024-01-22 NOTE — TELEPHONE ENCOUNTER
Tried calling [employer] back was on hold for an hour 15 minutes and then phone was picked up and hung up I will try to call back at later time

## 2024-01-22 NOTE — TELEPHONE ENCOUNTER
Hi, good morning. This is Mayra, the representative from the [facility] [employer]. I am calling in behalf of our member named Rigoberto Ramsay. His last name is Tashi Urena and his YOB: 1960. So I am calling in regards to this number for the medication to licity 3 milligram sense of the authorization. We did not yet receive any authorization for the Trulicity 3 milligram. So I am just informing that with this medication if you can be able to fax a the prior authorization to our authorization entity, you can fax it and the fax number [phone]. Again the fax number it will be [phone]. And then if you wanted to spoke with the authorization entity member or a representative authorization entity representative, you can dial the phone number [phone]. Again, the phone number of our authorization entity, you can dial the phone number [phone]. Thank you so much. Have a great day. Bye, bye for now.     PULMONARY CONSULT NOTE    ADMISSION DATE:  10/15/2022  CONSULTING PHYSICIAN:  Tavo Berg MD  REFERRING PHYSICIAN:  Marlon Mensah MD   CODE STATUS:   Code Status: Full Resuscitation     SUBJECTIVE:  I was asked to see Denisse Reyez at the request of Dr. Mensah for evaluation of wheeze.   Denisse Reyez is a 77 year old female patient admitted with high-output heart failure.      She has either COPD or asthma.  She says that she was told she does not have COPD, but there are no PFTs on file.  She has obstructive sleep apnea and wears oxygen 4 L/min at night and 1 L during the day.  The sleep studies were performed at an outside facility not available for review.    She woke up short of breath.  She has had a cough and congestion for the few days prior to admission.  She has chronic swelling of her legs that she attributes to venous insufficiency.  She denies fevers and chills.  She denies sweats.  In the ED she was hypoxic on her baseline 1 L and received a neb treatment.  She was noted to be wheezing.  HISTORIES:  Past Medical History:   Diagnosis Date   • Anemia    • Asthma    • Bipolar affective disorder (CMS/HCC)    • Breast cancer (CMS/HCC)    • Colon polyps    • Congestive cardiac failure (CMS/HCC)    • Essential (primary) hypertension    • Exogenous obesity    • Gastroesophageal reflux disease    • Hyperlipidemia    • Schizoaffective disorder (CMS/HCC)    • Sleep apnea     uses CPAP   • Traumatic brain injury      Past Surgical History:   Procedure Laterality Date   • Breast surgery     • Colonoscopy     • Mastectomy Bilateral      ALLERGIES:   Allergen Reactions   • Latex   (Environmental) HIVES   • Penicillin G Sodium RASH   • Sulfa Antibiotics Other (See Comments)     PT family has allergy-- was told also had one, but unsure reaction      Social History     Tobacco Use   • Smoking status: Former Smoker     Packs/day: 0.50     Years: 50.00     Pack years: 25.00     Types: Cigarettes     Quit  date: 2020     Years since quittin.3   • Smokeless tobacco: Never Used   Substance Use Topics   • Alcohol use: Never     Family History   Problem Relation Age of Onset   • Cancer Mother    • Stroke/TIA Father    • Cancer Sister    • Heart disease Sister    • Stroke/TIA Maternal Grandmother    • Cancer Paternal Grandfather           Medications Prior to Admission   Medication Sig Dispense Refill   • [] cephalexin (Keflex) 500 MG capsule Take 1 capsule by mouth in the morning and 1 capsule in the evening. Do all this for 7 days. 14 capsule 0   • docusate sodium (Colace) 100 MG capsule Take 1 capsule by mouth 2 times daily as needed for Constipation. 20 capsule 0   • rivaroxaban (XARELTO) 15 MG Tab Take 1 tablet by mouth in the morning and 1 tablet in the evening. Take with meals. 42 tablet 0   • bumetanide (BUMEX) 2 MG tablet Take 1 tablet by mouth 2 times daily. (Patient taking differently: Take 2 mg by mouth 2 times daily. Patient is taking 1.5 tabs(3mg)  BID) 180 tablet 1   • pantoprazole (PROTONIX) 40 MG tablet Take 1 tablet by mouth every 12 hours. 60 tablet 0   • thiamine (VITAMIN B1) 100 MG tablet Take 1 tablet by mouth daily. Do not start before May 20, 2022. 30 tablet 0   • zinc sulfate (ZINCATE) 220 (50 Zn) MG capsule Take 1 capsule by mouth daily (with breakfast). Do not start before May 20, 2022. 30 capsule 0   • ferrous sulfate 325 (65 FE) MG tablet Take 1 tablet by mouth daily (with breakfast). Do not start before May 20, 2022. 30 tablet 0   • benzonatate (TESSALON PERLES) 100 MG capsule Take 100 mg by mouth 3 times daily as needed for Cough.     • potassium CHLORIDE 20 MEQ/15ML (10%) solution Take 20 mEq by mouth daily.     • albuterol 108 (90 Base) MCG/ACT inhaler Inhale 2 puffs into the lungs every 4 hours as needed for Shortness of Breath or Wheezing. 1 each 0   • budesonide-formoterol (SYMBICORT) 80-4.5 MCG/ACT inhaler Inhale 2 puffs into the lungs 2 times daily. 0800, 1600 10.2 g 0    • busPIRone (BUSPAR) 15 MG tablet Take 1 tablet by mouth 3 times daily. 0800, 1200, 1600 (Patient taking differently: Take 15 mg by mouth daily.) 21 tablet 0   • sertraline (ZOLOFT) 100 MG tablet Take 1 tablet by mouth daily. 0800 7 tablet 0   • albuterol (VENTOLIN) (2.5 MG/3ML) 0.083% nebulizer solution Take 2.5 mg by nebulization every 4 hours as needed for Wheezing.     • aspirin-acetaminophen-caffeine (EXCEDRIN MIGRAINE) 250-250-65 MG per tablet Take 1 tablet by mouth every 6 hours as needed (migraine). (Patient taking differently: Take 1 tablet by mouth every 6 hours as needed for Pain (Joint pain).) 10 tablet 0   • guaiFENesin-DM (MUCINEX DM)  MG per 12 hr tablet Take 1 tablet by mouth every 12 hours. (Patient taking differently: Take 1 tablet by mouth every evening. 2000) 10 tablet 0   • magnesium hydroxide (MILK OF MAGNESIA) 400 MG/5ML suspension Take 30 mLs by mouth at bedtime as needed for Constipation.      • polyvinyl alcohol (LIQUITEARS) 1.4 % ophthalmic solution Place 1 drop into both eyes 2 times daily. 0800, 1600     • Multiple Vitamin (Daily Roberto) tablet Take 1 tablet by mouth daily. 0800     • calcium carbonate-cholecalciferol 500-200 MG-UNIT tablet Take 1 tablet by mouth every morning. 0800     • loratadine (CLARITIN) 10 MG tablet Take 10 mg by mouth daily as needed.      • ipratropium-albuterol (DUONEB) 0.5-2.5 (3) MG/3ML nebulizer solution Take 3 mLs by nebulization every 6 hours as needed.      • montelukast (SINGULAIR) 10 MG tablet Take 10 mg by mouth daily. 0800     • magnesium oxide (MAG-OX) 400 MG tablet Take 400 mg by mouth daily. 0800     • atorvastatin (LIPITOR) 10 MG tablet Take 10 mg by mouth every evening. 1600         REVIEW OF SYSTEMS:  Review of Systems     SCHEDULED MEDS:    Current Facility-Administered Medications   Medication Dose Route Frequency Provider Last Rate Last Admin   • sodium chloride (PF) 0.9 % injection 2 mL  2 mL Intracatheter 2 times per day Marlon Mensah  MD       • Magnesium Standard Replacement Protocol   Does not apply See Admin Instructions Marlon Mensah MD       • Potassium Standard Replacement Protocol (Levels 3.5 and lower)   Does not apply See Admin Instructions Marlon Mensah MD       • Potassium Replacement (Levels 3.6 - 4)   Does not apply See Admin Instructions Marlon Mensah MD       • atorvastatin (LIPITOR) tablet 10 mg  10 mg Oral Q Evening Marlon Mensah MD       • budesonide-formoterol (SYMBICORT) 80-4.5 MCG/ACT inhaler 2 puff  2 puff Inhalation BID Marlon Mensah MD       • busPIRone (BUSPAR) tablet 15 mg  15 mg Oral TID Marlon Mensah MD       • ferrous sulfate (65 mg Fe per 325 mg) tablet 325 mg  325 mg Oral Daily with breakfast Marlon Mensah MD       • guaiFENesin-DM (MUCINEX DM) 600-30 mg ER tablet 1 tablet  1 tablet Oral 2 times per day Marlon Mensah MD       • montelukast (SINGULAIR) tablet 10 mg  10 mg Oral Daily Marlon Mensah MD       • pantoprazole (PROTONIX) EC tablet 40 mg  40 mg Oral 2 times per day Marlon Mensah MD       • rivaroxaban (XARELTO) tablet 15 mg  15 mg Oral BID WC Marlon Mensah MD       • sertraline (ZOLOFT) tablet 100 mg  100 mg Oral Daily Marlon Mensah MD       • thiamine (VITAMIN B1) tablet 100 mg  100 mg Oral Daily Marlon Mensah MD           PRN MEDS:  Current Facility-Administered Medications   Medication Dose Route Frequency Provider Last Rate Last Admin   • sodium chloride 0.9 % flush bag 25 mL  25 mL Intravenous PRN Marlon Mensah MD       • acetaminophen (TYLENOL) tablet 650 mg  650 mg Oral Q4H PRN Marlon Mensah MD       • docusate sodium-sennosides (SENOKOT S) 50-8.6 MG 2 tablet  2 tablet Oral Daily PRN Marlon Mensah MD       • aluminum-magnesium hydroxide-simethicone (MAALOX) 200-200-20 MG/5ML suspension 30 mL  30 mL Oral Q4H PRN Marlon Mensah MD       • sodium chloride 0.9 % flush bag 25 mL  25 mL Intravenous PRN Marlon Mensah MD       • ondansetron (ZOFRAN) injection 4 mg  4 mg Intravenous Q6H PRN Marlon Mensah MD       •  HYDROcodone-acetaminophen (NORCO) 5-325 MG per tablet 1 tablet  1 tablet Oral Q4H PRN Marlon Mensah MD       • albuterol inhaler 2 puff  2 puff Inhalation Q4H PRN Marlon Mensah MD       • albuterol (VENTOLIN) nebulizer 2.5 mg  2.5 mg Nebulization Q4H PRN Marlon Mensah MD           OBJECTIVE:  VITAL SIGNS:  Vital Last Value 24 Hour Range   Temperature 98.6 °F (37 °C) (10/15/22 1003) Temp  Min: 98.6 °F (37 °C)  Max: 98.8 °F (37.1 °C)   Pulse (!) 56 (10/15/22 1003) Pulse  Min: 47  Max: 68   Respiratory 19 (10/15/22 1003) Resp  Min: 17  Max: 23   Non-Invasive  Blood Pressure 126/69 (10/15/22 1003) BP  Min: 116/54  Max: 152/62   Pulse Oximetry 92 % (10/15/22 1003) SpO2  Min: 92 %  Max: 100 %     Vital Today Admitted   Weight 128.5 kg (283 lb 4.7 oz) (10/15/22 1111) Weight: 128.5 kg (283 lb 4.7 oz) (10/15/22 1003)   Height N/A Height: 5' 5\" (165.1 cm) (10/15/22 1003)   BMI N/A BMI (Calculated): 47.14 (10/15/22 1003)       INTAKE/OUTPUT LAST 3 SHIFTS:  No intake/output data recorded.    VENT SETTINGS LAST 24 HOURS:       HEMODYNAMIC SETTINGS LAST 24 HOURS:       PHYSICAL EXAM:  General: Awake and alert.  Obese.  No distress  Eyes:  PERRL, conjunctivae/corneas clear.  HEENT: No oral lesions.  Neck: Supple, symmetrical.  Trachea midline. No adenopathy.  Lungs: Breath sounds equal bilaterally. Mild wheeze  Heart:: Regular rate and rhythm. No murmur, rub or gallop.  Abdomen:  Soft, nontender.  Bowel sounds present. No masses or hepatomegaly or splenomegaly.  Extremities:  No clubbing, cyanosis.  +2 LE edema       LABORATORY DATA:               Recent Results (from the past 24 hour(s))   Comprehensive Metabolic Panel    Collection Time: 10/15/22  6:47 AM   Result Value Ref Range    Fasting Status      Sodium 140 135 - 145 mmol/L    Potassium 3.9 3.4 - 5.1 mmol/L    Chloride 103 97 - 110 mmol/L    Carbon Dioxide 29 21 - 32 mmol/L    Anion Gap 12 7 - 19 mmol/L    Glucose 118 (H) 70 - 99 mg/dL    BUN 21 (H) 6 - 20 mg/dL    Creatinine  1.36 (H) 0.51 - 0.95 mg/dL    Glomerular Filtration Rate 40 (L) >=60    BUN/ Creatinine Ratio 15 7 - 25    Calcium 8.6 8.4 - 10.2 mg/dL    Bilirubin, Total 0.3 0.2 - 1.0 mg/dL    GOT/AST 33 <=37 Units/L    GPT/ALT 40 <64 Units/L    Alkaline Phosphatase 97 45 - 117 Units/L    Albumin 3.1 (L) 3.6 - 5.1 g/dL    Protein, Total 6.6 6.4 - 8.2 g/dL    Globulin 3.5 2.0 - 4.0 g/dL    A/G Ratio 0.9 (L) 1.0 - 2.4   TROPONIN I, HIGH SENSITIVITY    Collection Time: 10/15/22  6:47 AM   Result Value Ref Range    Troponin I, High Sensitivity 22 <52 ng/L   CBC with Automated Differential (performable only)    Collection Time: 10/15/22  6:47 AM   Result Value Ref Range    WBC 6.4 4.2 - 11.0 K/mcL    RBC 2.40 (L) 4.00 - 5.20 mil/mcL    HGB 7.3 (L) 12.0 - 15.5 g/dL    HCT 24.5 (L) 36.0 - 46.5 %    .1 (H) 78.0 - 100.0 fl    MCH 30.4 26.0 - 34.0 pg    MCHC 29.8 (L) 32.0 - 36.5 g/dL    RDW-CV 14.5 11.0 - 15.0 %    RDW-SD 53.3 (H) 39.0 - 50.0 fL     140 - 450 K/mcL    NRBC 0 <=0 /100 WBC    Neutrophil, Percent 60 %    Lymphocytes, Percent 22 %    Mono, Percent 12 %    Eosinophils, Percent 4 %    Basophils, Percent 1 %    Immature Granulocytes 1 %    Absolute Neutrophils 3.9 1.8 - 7.7 K/mcL    Absolute Lymphocytes 1.4 1.0 - 4.0 K/mcL    Absolute Monocytes 0.8 0.3 - 0.9 K/mcL    Absolute Eosinophils  0.3 0.0 - 0.5 K/mcL    Absolute Basophils 0.0 0.0 - 0.3 K/mcL    Absolute Immmature Granulocytes 0.0 0.0 - 0.2 K/mcL   NT proBNP    Collection Time: 10/15/22  6:47 AM   Result Value Ref Range    NT-proBNP 1,540 (H) <=450 pg/mL   Electrocardiogram 12-Lead    Collection Time: 10/15/22  6:47 AM   Result Value Ref Range    Ventricular Rate EKG/Min (BPM) 49     NH-Interval (MSEC) 115     QRS-Interval (MSEC) 93     QT-Interval (MSEC) 459     QTc 427     P Axis (Degrees) 46     R Axis (Degrees) 44     T Axis (Degrees) 62     REPORT TEXT       SINUS BRADYCARDIA WITH SHORT NH INTERVAL  BORDERLINE ECG  UNCONFIRMED REPORT     Blood Culture     Collection Time: 10/15/22  7:29 AM    Specimen: Blood   Result Value Ref Range    Culture, Blood or Bone Marrow No Growth <24 hours    Blood Culture    Collection Time: 10/15/22  7:29 AM    Specimen: Blood   Result Value Ref Range    Culture, Blood or Bone Marrow No Growth <24 hours    Lactic Acid Venous With Reflex    Collection Time: 10/15/22  7:29 AM   Result Value Ref Range    Lactate, Venous 1.1 0.0 - 2.0 mmol/L   Rapid SARS-CoV-2 by PCR    Collection Time: 10/15/22  9:07 AM    Specimen: Nasal, Mid-turbinate; Swab   Result Value Ref Range    Rapid SARS-COV-2 by PCR Not Detected Not Detected / Detected / Presumptive Positive / Inhibitors present    Isolation Guidelines      Procedural Comment          IMAGING STUDIES:  Chest single view study on   10/15/2022 7:40 AM      CLINICAL INDICATION: 77-year-old woman with shortness of breath     COMPARISON: 10/07/2022     FINDINGS:   A single AP upright   portable view of the chest was obtained.  There is  limitation due to large body habitus, portable technique, and breast  implants.  The heart is normal in size and contour.  The jason and mediastinum are normal as visualized.   Evaluation of the lungs is limited but no acute infiltrates or edema  identified.  The costophrenic angles are sharp.  Bones, joints, and soft tissues are unremarkable.     IMPRESSION:      Limited portable chest examination without acute pathology identified      IMPRESSION:   Acute on chronic diastolic heart failure  Bronchospasm  Acute on chronic hypoxic respiratory failure  Obstructive sleep apnea  Obesity    PLAN:   Suspect that her admission is primarily due to acute on chronic diastolic heart failure.  Her BNP is elevated.  There is no evidence of an infection.  Cardiology has been consulted  She has a vague history of obstructive lung disease.  There are no PFTs available for review, although they have been ordered several times.  An infection to require antibiotics.  Continue steroids  and bronchodilators.    Tavo Berg MD

## 2024-01-29 DIAGNOSIS — G89.4 CHRONIC PAIN SYNDROME: ICD-10-CM

## 2024-02-03 LAB — COLOGUARD RESULT REPORTABLE: NEGATIVE

## 2024-02-06 RX ORDER — IBUPROFEN 800 MG/1
800 TABLET ORAL EVERY 8 HOURS PRN
Qty: 40 TABLET | Refills: 0 | Status: SHIPPED | OUTPATIENT
Start: 2024-02-06

## 2024-02-17 DIAGNOSIS — E78.2 MIXED HYPERLIPIDEMIA: ICD-10-CM

## 2024-02-17 DIAGNOSIS — I10 BENIGN ESSENTIAL HTN: ICD-10-CM

## 2024-02-19 RX ORDER — ATORVASTATIN CALCIUM 80 MG/1
80 TABLET, FILM COATED ORAL DAILY
Qty: 90 TABLET | Refills: 1 | Status: SHIPPED | OUTPATIENT
Start: 2024-02-19

## 2024-02-19 RX ORDER — LISINOPRIL 5 MG/1
5 TABLET ORAL DAILY
Qty: 90 TABLET | Refills: 1 | Status: SHIPPED | OUTPATIENT
Start: 2024-02-19

## 2024-02-20 ENCOUNTER — OFFICE VISIT (OUTPATIENT)
Dept: FAMILY MEDICINE CLINIC | Facility: CLINIC | Age: 64
End: 2024-02-20
Payer: COMMERCIAL

## 2024-02-20 VITALS
HEIGHT: 64 IN | TEMPERATURE: 98.9 F | BODY MASS INDEX: 53.78 KG/M2 | HEART RATE: 88 BPM | OXYGEN SATURATION: 97 % | SYSTOLIC BLOOD PRESSURE: 130 MMHG | DIASTOLIC BLOOD PRESSURE: 78 MMHG | WEIGHT: 315 LBS

## 2024-02-20 DIAGNOSIS — J06.9 UPPER RESPIRATORY TRACT INFECTION, UNSPECIFIED TYPE: Primary | ICD-10-CM

## 2024-02-20 PROCEDURE — 99213 OFFICE O/P EST LOW 20 MIN: CPT | Performed by: FAMILY MEDICINE

## 2024-02-20 RX ORDER — FLUTICASONE PROPIONATE 50 MCG
2 SPRAY, SUSPENSION (ML) NASAL DAILY
Qty: 16 G | Refills: 0 | Status: SHIPPED | OUTPATIENT
Start: 2024-02-20

## 2024-02-20 RX ORDER — BENZONATATE 100 MG/1
100 CAPSULE ORAL 3 TIMES DAILY
Qty: 20 CAPSULE | Refills: 0 | Status: SHIPPED | OUTPATIENT
Start: 2024-02-20 | End: 2024-02-27

## 2024-02-20 NOTE — PROGRESS NOTES
"Assessment/Plan:    No problem-specific Assessment & Plan notes found for this encounter.       Diagnoses and all orders for this visit:    Upper respiratory tract infection, unspecified type  Comments:  Normal p.o. intake no fever normal bowel movements  Orders:  -     fluticasone (FLONASE) 50 mcg/act nasal spray; 2 sprays into each nostril daily  -     benzonatate (TESSALON PERLES) 100 mg capsule; Take 1 capsule (100 mg total) by mouth 3 (three) times a day for 7 days        Return if symptoms worsen or fail to improve  Subjective:      Patient ID: Devendra Urena is a 63 y.o. male.    HPI    Patient is here for runny nose and cough.  He reports that for couple of days he has been having nonproductive cough and runny nose.  He reports that his grand son was sick recently with the same symptoms.  He tried over-the-counter Robitussin without any improvement.  Patient has normal p.o. intake bowel movements and no fever  Cough and runny nose.  No fever.      The following portions of the patient's history were reviewed and updated as appropriate: allergies, current medications, past family history, past medical history, past social history, past surgical history, and problem list.    Review of Systems   Constitutional:  Negative for activity change, appetite change and fever.   HENT:  Positive for rhinorrhea. Negative for dental problem.    Eyes:  Negative for discharge and itching.   Respiratory:  Positive for cough. Negative for apnea, choking, chest tightness and stridor.    Cardiovascular:  Negative for chest pain, palpitations and leg swelling.   Neurological:  Negative for dizziness, light-headedness and headaches.         Objective:      /78 (BP Location: Left arm, Patient Position: Sitting)   Pulse 88   Temp 98.9 °F (37.2 °C) (Tympanic)   Ht 5' 4\" (1.626 m)   Wt (!) 145 kg (320 lb 9.6 oz)   SpO2 97%   BMI 55.03 kg/m²          Physical Exam  Vitals reviewed.   HENT:      Head: Normocephalic and " atraumatic.      Right Ear: Tympanic membrane normal.      Left Ear: Tympanic membrane normal.      Nose: Nose normal.      Mouth/Throat:      Mouth: Mucous membranes are moist.   Eyes:      Conjunctiva/sclera: Conjunctivae normal.      Pupils: Pupils are equal, round, and reactive to light.   Cardiovascular:      Rate and Rhythm: Normal rate and regular rhythm.      Pulses: Normal pulses.      Heart sounds: Normal heart sounds.   Pulmonary:      Effort: Pulmonary effort is normal.      Breath sounds: Normal breath sounds.   Skin:     General: Skin is warm.      Capillary Refill: Capillary refill takes less than 2 seconds.   Neurological:      Mental Status: He is alert.   Psychiatric:         Mood and Affect: Mood normal.

## 2024-02-26 ENCOUNTER — TELEPHONE (OUTPATIENT)
Dept: FAMILY MEDICINE CLINIC | Facility: CLINIC | Age: 64
End: 2024-02-26

## 2024-02-26 DIAGNOSIS — J21.9 ACUTE BRONCHIOLITIS DUE TO UNSPECIFIED ORGANISM: Primary | ICD-10-CM

## 2024-02-26 RX ORDER — AMOXICILLIN AND CLAVULANATE POTASSIUM 875; 125 MG/1; MG/1
1 TABLET, FILM COATED ORAL EVERY 12 HOURS SCHEDULED
Qty: 14 TABLET | Refills: 0 | Status: SHIPPED | OUTPATIENT
Start: 2024-02-26 | End: 2024-03-04

## 2024-02-26 NOTE — TELEPHONE ENCOUNTER
Pt called in asking if there was something that can be called over as he is still having a rough cough.He has taken the medications that were prescribed and said they are not helping.     Please advise

## 2024-03-03 DIAGNOSIS — E11.40 TYPE 2 DIABETES MELLITUS WITH DIABETIC NEUROPATHY, WITHOUT LONG-TERM CURRENT USE OF INSULIN (HCC): ICD-10-CM

## 2024-03-21 DIAGNOSIS — G89.4 CHRONIC PAIN SYNDROME: ICD-10-CM

## 2024-03-21 RX ORDER — IBUPROFEN 800 MG/1
800 TABLET ORAL EVERY 8 HOURS PRN
Qty: 40 TABLET | Refills: 0 | Status: SHIPPED | OUTPATIENT
Start: 2024-03-21

## 2024-03-23 ENCOUNTER — APPOINTMENT (OUTPATIENT)
Dept: LAB | Facility: CLINIC | Age: 64
End: 2024-03-23
Payer: COMMERCIAL

## 2024-03-23 DIAGNOSIS — E11.40 TYPE 2 DIABETES MELLITUS WITH DIABETIC NEUROPATHY, WITHOUT LONG-TERM CURRENT USE OF INSULIN (HCC): ICD-10-CM

## 2024-03-23 DIAGNOSIS — Z13.29 SCREENING FOR ENDOCRINE, METABOLIC AND IMMUNITY DISORDER: ICD-10-CM

## 2024-03-23 DIAGNOSIS — Z13.0 SCREENING FOR ENDOCRINE, METABOLIC AND IMMUNITY DISORDER: ICD-10-CM

## 2024-03-23 DIAGNOSIS — Z13.228 SCREENING FOR ENDOCRINE, METABOLIC AND IMMUNITY DISORDER: ICD-10-CM

## 2024-03-23 DIAGNOSIS — E11.42 TYPE 2 DIABETES MELLITUS WITH DIABETIC POLYNEUROPATHY, WITHOUT LONG-TERM CURRENT USE OF INSULIN (HCC): ICD-10-CM

## 2024-03-23 LAB
ALBUMIN SERPL BCP-MCNC: 4.4 G/DL (ref 3.5–5)
ALP SERPL-CCNC: 92 U/L (ref 34–104)
ALT SERPL W P-5'-P-CCNC: 22 U/L (ref 7–52)
ANION GAP SERPL CALCULATED.3IONS-SCNC: 12 MMOL/L (ref 4–13)
AST SERPL W P-5'-P-CCNC: 19 U/L (ref 13–39)
BASOPHILS # BLD AUTO: 0.08 THOUSANDS/ÂΜL (ref 0–0.1)
BASOPHILS NFR BLD AUTO: 1 % (ref 0–1)
BILIRUB SERPL-MCNC: 0.9 MG/DL (ref 0.2–1)
BUN SERPL-MCNC: 11 MG/DL (ref 5–25)
CALCIUM SERPL-MCNC: 9.2 MG/DL (ref 8.4–10.2)
CHLORIDE SERPL-SCNC: 102 MMOL/L (ref 96–108)
CHOLEST SERPL-MCNC: 135 MG/DL
CO2 SERPL-SCNC: 22 MMOL/L (ref 21–32)
CREAT SERPL-MCNC: 0.7 MG/DL (ref 0.6–1.3)
EOSINOPHIL # BLD AUTO: 0.3 THOUSAND/ÂΜL (ref 0–0.61)
EOSINOPHIL NFR BLD AUTO: 3 % (ref 0–6)
ERYTHROCYTE [DISTWIDTH] IN BLOOD BY AUTOMATED COUNT: 13.3 % (ref 11.6–15.1)
EST. AVERAGE GLUCOSE BLD GHB EST-MCNC: 151 MG/DL
GFR SERPL CREATININE-BSD FRML MDRD: 101 ML/MIN/1.73SQ M
GLUCOSE P FAST SERPL-MCNC: 133 MG/DL (ref 65–99)
HBA1C MFR BLD: 6.9 %
HCT VFR BLD AUTO: 46.8 % (ref 36.5–49.3)
HDLC SERPL-MCNC: 44 MG/DL
HGB BLD-MCNC: 15.2 G/DL (ref 12–17)
IMM GRANULOCYTES # BLD AUTO: 0.03 THOUSAND/UL (ref 0–0.2)
IMM GRANULOCYTES NFR BLD AUTO: 0 % (ref 0–2)
LDLC SERPL CALC-MCNC: 59 MG/DL (ref 0–100)
LYMPHOCYTES # BLD AUTO: 2.63 THOUSANDS/ÂΜL (ref 0.6–4.47)
LYMPHOCYTES NFR BLD AUTO: 22 % (ref 14–44)
MCH RBC QN AUTO: 28.7 PG (ref 26.8–34.3)
MCHC RBC AUTO-ENTMCNC: 32.5 G/DL (ref 31.4–37.4)
MCV RBC AUTO: 88 FL (ref 82–98)
MONOCYTES # BLD AUTO: 0.89 THOUSAND/ÂΜL (ref 0.17–1.22)
MONOCYTES NFR BLD AUTO: 8 % (ref 4–12)
NEUTROPHILS # BLD AUTO: 7.87 THOUSANDS/ÂΜL (ref 1.85–7.62)
NEUTS SEG NFR BLD AUTO: 66 % (ref 43–75)
NONHDLC SERPL-MCNC: 91 MG/DL
NRBC BLD AUTO-RTO: 0 /100 WBCS
PLATELET # BLD AUTO: 391 THOUSANDS/UL (ref 149–390)
PMV BLD AUTO: 11 FL (ref 8.9–12.7)
POTASSIUM SERPL-SCNC: 4.4 MMOL/L (ref 3.5–5.3)
PROT SERPL-MCNC: 7.9 G/DL (ref 6.4–8.4)
RBC # BLD AUTO: 5.3 MILLION/UL (ref 3.88–5.62)
SODIUM SERPL-SCNC: 136 MMOL/L (ref 135–147)
TRIGL SERPL-MCNC: 161 MG/DL
TSH SERPL DL<=0.05 MIU/L-ACNC: 3.07 UIU/ML (ref 0.45–4.5)
WBC # BLD AUTO: 11.8 THOUSAND/UL (ref 4.31–10.16)

## 2024-03-23 PROCEDURE — 36415 COLL VENOUS BLD VENIPUNCTURE: CPT

## 2024-03-23 PROCEDURE — 84443 ASSAY THYROID STIM HORMONE: CPT

## 2024-03-23 PROCEDURE — 83036 HEMOGLOBIN GLYCOSYLATED A1C: CPT

## 2024-03-23 PROCEDURE — 80053 COMPREHEN METABOLIC PANEL: CPT

## 2024-03-23 PROCEDURE — 85025 COMPLETE CBC W/AUTO DIFF WBC: CPT

## 2024-03-23 PROCEDURE — 80061 LIPID PANEL: CPT

## 2024-04-02 DIAGNOSIS — I10 BENIGN ESSENTIAL HTN: ICD-10-CM

## 2024-04-03 ENCOUNTER — OFFICE VISIT (OUTPATIENT)
Dept: FAMILY MEDICINE CLINIC | Facility: CLINIC | Age: 64
End: 2024-04-03
Payer: COMMERCIAL

## 2024-04-03 VITALS
TEMPERATURE: 96.2 F | WEIGHT: 315 LBS | BODY MASS INDEX: 53.78 KG/M2 | HEIGHT: 64 IN | HEART RATE: 88 BPM | SYSTOLIC BLOOD PRESSURE: 118 MMHG | OXYGEN SATURATION: 98 % | DIASTOLIC BLOOD PRESSURE: 68 MMHG

## 2024-04-03 DIAGNOSIS — E66.01 CLASS 3 SEVERE OBESITY DUE TO EXCESS CALORIES WITH SERIOUS COMORBIDITY AND BODY MASS INDEX (BMI) OF 50.0 TO 59.9 IN ADULT (HCC): Primary | ICD-10-CM

## 2024-04-03 PROCEDURE — 99213 OFFICE O/P EST LOW 20 MIN: CPT | Performed by: FAMILY MEDICINE

## 2024-04-03 RX ORDER — METOPROLOL TARTRATE 50 MG/1
50 TABLET, FILM COATED ORAL EVERY 12 HOURS
Qty: 60 TABLET | Refills: 7 | Status: SHIPPED | OUTPATIENT
Start: 2024-04-03

## 2024-04-03 NOTE — PROGRESS NOTES
Assessment/Plan:    No problem-specific Assessment & Plan notes found for this encounter.       Diagnoses and all orders for this visit:    Class 3 severe obesity due to excess calories with serious comorbidity and body mass index (BMI) of 50.0 to 59.9 in adult (HCC)  Comments:  Increased Trulicity to max dose.  Currently 317 Lb from 322 in January.  Rec.  to see weight management team  Does not FUP healthy diet, eats lots of carbs  Orders:  -     dulaglutide (Trulicity) 4.5 MG/0.5ML injection; Inject 0.5 mL (4.5 mg total) under the skin every 7 days  -     Ambulatory Referral to Weight Management; Future     Come back in 2 months for weight management    Subjective:      Patient ID: Devendra Urena is a 63 y.o. male.    JESSICA Raymundo is here for weight management follow-up.  I did start with him Trulicity.  I increased his Trulicity to the max dose.  At this time patient is not following up healthy diet his diet is rich with carbs.  Unfortunately he cannot do exercise secondary to hip pain he is using cane on a regular basis.  Discussed with patient if max dose of Trulicity does not help then he needs to see bariatric team.  Patient is agreeable.  The following portions of the patient's history were reviewed and updated as appropriate: allergies, current medications, past family history, past medical history, past social history, past surgical history, and problem list.    Review of Systems   Constitutional:  Negative for chills and fever.   HENT:  Negative for ear pain and sore throat.    Eyes:  Negative for pain and visual disturbance.   Respiratory:  Negative for cough and shortness of breath.    Cardiovascular:  Negative for chest pain and palpitations.   Gastrointestinal:  Negative for abdominal pain and vomiting.   Genitourinary:  Negative for dysuria and hematuria.   Musculoskeletal:  Positive for arthralgias (chronic) and back pain (chronic).   Skin:  Negative for color change and rash.   Neurological:  Negative  "for seizures and syncope.   All other systems reviewed and are negative.        Objective:      /68 (BP Location: Left arm, Patient Position: Sitting)   Pulse 88   Temp (!) 96.2 °F (35.7 °C) (Tympanic)   Ht 5' 4\" (1.626 m)   Wt (!) 144 kg (317 lb)   SpO2 98%   BMI 54.41 kg/m²          Physical Exam  Vitals reviewed.   Constitutional:       General: He is not in acute distress.     Appearance: He is obese. He is not toxic-appearing.   HENT:      Head: Normocephalic and atraumatic.      Right Ear: Tympanic membrane normal.      Left Ear: Tympanic membrane normal.      Nose: Nose normal.      Mouth/Throat:      Mouth: Mucous membranes are moist.   Eyes:      Conjunctiva/sclera: Conjunctivae normal.      Pupils: Pupils are equal, round, and reactive to light.   Cardiovascular:      Rate and Rhythm: Normal rate and regular rhythm.      Pulses: Normal pulses.      Heart sounds: Normal heart sounds.   Pulmonary:      Effort: Pulmonary effort is normal.      Breath sounds: Normal breath sounds.   Musculoskeletal:      Right lower leg: No edema.      Left lower leg: No edema.   Skin:     General: Skin is warm.      Capillary Refill: Capillary refill takes less than 2 seconds.   Neurological:      Mental Status: He is alert.   Psychiatric:         Mood and Affect: Mood normal.           "

## 2024-04-11 DIAGNOSIS — K21.9 GASTROESOPHAGEAL REFLUX DISEASE, UNSPECIFIED WHETHER ESOPHAGITIS PRESENT: ICD-10-CM

## 2024-04-11 RX ORDER — FAMOTIDINE 20 MG/1
20 TABLET, FILM COATED ORAL 2 TIMES DAILY
Qty: 60 TABLET | Refills: 7 | Status: SHIPPED | OUTPATIENT
Start: 2024-04-11

## 2024-05-21 DIAGNOSIS — G89.4 CHRONIC PAIN SYNDROME: ICD-10-CM

## 2024-05-21 RX ORDER — IBUPROFEN 800 MG/1
800 TABLET ORAL EVERY 8 HOURS PRN
Qty: 90 TABLET | Refills: 1 | Status: SHIPPED | OUTPATIENT
Start: 2024-05-21

## 2024-05-21 NOTE — TELEPHONE ENCOUNTER
Reason for call:   [x] Refill   [] Prior Auth  [] Other:     Office:   [x] PCP/Provider - Dr Salmeron   [] Specialty/Provider -     Medication: ibuprofen    Dose/Frequency: 800 mg Q8H PRN     Quantity: 30D w refill if possible     Pharmacy: [facility]     Does the patient have enough for 3 days?   [] Yes   [x] No - Send as HP to POD

## 2024-06-04 ENCOUNTER — OFFICE VISIT (OUTPATIENT)
Dept: FAMILY MEDICINE CLINIC | Facility: CLINIC | Age: 64
End: 2024-06-04
Payer: COMMERCIAL

## 2024-06-04 VITALS
OXYGEN SATURATION: 97 % | HEART RATE: 61 BPM | WEIGHT: 294.4 LBS | DIASTOLIC BLOOD PRESSURE: 64 MMHG | SYSTOLIC BLOOD PRESSURE: 110 MMHG | TEMPERATURE: 98.3 F | BODY MASS INDEX: 50.26 KG/M2 | HEIGHT: 64 IN

## 2024-06-04 DIAGNOSIS — I10 BENIGN ESSENTIAL HTN: ICD-10-CM

## 2024-06-04 DIAGNOSIS — E66.01 MORBID OBESITY (HCC): ICD-10-CM

## 2024-06-04 DIAGNOSIS — E11.40 TYPE 2 DIABETES MELLITUS WITH DIABETIC NEUROPATHY, WITHOUT LONG-TERM CURRENT USE OF INSULIN (HCC): Primary | ICD-10-CM

## 2024-06-04 DIAGNOSIS — Z23 ENCOUNTER FOR IMMUNIZATION: ICD-10-CM

## 2024-06-04 PROCEDURE — 99214 OFFICE O/P EST MOD 30 MIN: CPT

## 2024-06-04 RX ORDER — BLOOD-GLUCOSE METER
KIT MISCELLANEOUS
Qty: 1 KIT | Refills: 0 | Status: SHIPPED | OUTPATIENT
Start: 2024-06-04

## 2024-06-04 RX ORDER — DAPAGLIFLOZIN 5 MG/1
5 TABLET, FILM COATED ORAL DAILY
Qty: 90 TABLET | Refills: 1 | Status: SHIPPED | OUTPATIENT
Start: 2024-06-04

## 2024-06-04 RX ORDER — BLOOD SUGAR DIAGNOSTIC
STRIP MISCELLANEOUS
Qty: 200 EACH | Refills: 3 | Status: SHIPPED | OUTPATIENT
Start: 2024-06-04

## 2024-06-04 RX ORDER — LANCETS 33 GAUGE
EACH MISCELLANEOUS
Qty: 200 EACH | Refills: 3 | Status: SHIPPED | OUTPATIENT
Start: 2024-06-04

## 2024-06-04 NOTE — PROGRESS NOTES
Ambulatory Visit  Name: Devendra Urena      : 1960      MRN: [id number]  Encounter Provider: Dariana Salmeron MD  Encounter Date: 2024   Encounter department: [facility]    Assessment & Plan   1. Type 2 diabetes mellitus with diabetic neuropathy, without long-term current use of insulin (HCC)  Comments:  Trulicity is backorder. At this time I will start Farxiga.   cont metformin 2000 daily  Diabetic eye exam 2024 _Nl  [id number]  Orders:  -     Diabetic foot exam; Future  -     Fingerstick Glucose (POCT)  -     dapagliflozin (Farxiga) 5 MG TABS; Take 1 tablet (5 mg total) by mouth in the morning  -     Blood Glucose Monitoring Suppl (OneTouch Verio Reflect) w/Device KIT; Check blood sugars twice daily. Please substitute with appropriate alternative as covered by patient's insurance. Dx: E11.65  -     glucose blood (OneTouch Verio) test strip; Check blood sugars twice daily. Please substitute with appropriate alternative as covered by patient's insurance. Dx: E11.65  -     OneTouch Delica Lancets 33G MISC; Check blood sugars twice daily. Please substitute with appropriate alternative as covered by patient's insurance. Dx: E11.65  2. Encounter for immunization  Comments:  Refused zoster vaccine  3. Morbid obesity (HCC)  Comments:  Lost 23 pounds with diet only.  Unfortunately Trulicity backorder  Continue healthy diet  4. Benign essential HTN  Comments:  On lisinopril 5 mg.  Continue current   stable     Return in 3 months for diabetes recheck.    History of Present Illness     HPI  Patient is here for weight management follow-up.  I did prescribe him Trulicity.  Unfortunately he could not  Trulicity due to backorder     Patient started healthy diet he lost 23 pounds April to .  Recommended to continue healthy diet.  Cannot do exercise due to hip pain he is using cane.      Additionally regarding his diabetes I added Farxiga his A1c is improving but still not at  the goal.  He is taking metformin 1000 mg twice daily.  He had eye exam done January 1, 2024 which was within normal limits     Patient is not seeing podiatry he does not have numbness tingling or weakness in lower extremities.        Review of Systems   Constitutional:  Negative for chills and fever.   HENT:  Negative for ear pain and sore throat.    Eyes:  Negative for pain and visual disturbance.   Respiratory:  Negative for cough and shortness of breath.    Cardiovascular:  Negative for chest pain and palpitations.   Gastrointestinal:  Negative for abdominal pain and vomiting.   Genitourinary:  Negative for dysuria and hematuria.   Musculoskeletal:  Positive for back pain (chronic).   Skin:  Negative for color change and rash.   Neurological:  Negative for seizures and syncope.   All other systems reviewed and are negative.    Medical History Reviewed by provider this encounter:       Current Outpatient Medications on File Prior to Visit   Medication Sig Dispense Refill    aspirin 81 MG tablet Take 1 tablet (81 mg total) by mouth daily      atorvastatin (LIPITOR) 80 mg tablet take 1 tablet by mouth once daily 90 tablet 1    famotidine (PEPCID) 20 mg tablet take 1 tablet by mouth twice a day 60 tablet 7    fluticasone (FLONASE) 50 mcg/act nasal spray 2 sprays into each nostril daily 16 g 0    glucose blood (OneTouch Verio) test strip Check blood sugars twice daily. Please substitute with appropriate alternative as covered by patient's insurance. Dx: E11.65 200 each 3    glucose monitoring kit (FREESTYLE) monitoring kit Use 1 each 2 (two) times a day 1 each 0    ibuprofen (MOTRIN) 800 mg tablet Take 1 tablet (800 mg total) by mouth every 8 (eight) hours as needed for mild pain, moderate pain, fever or headaches 90 tablet 1    lisinopril (ZESTRIL) 5 mg tablet take 1 tablet by mouth once daily 90 tablet 1    metFORMIN (GLUCOPHAGE) 1000 MG tablet take 1 tablet by mouth twice a day with meals 60 tablet 5    metoprolol  "tartrate (LOPRESSOR) 50 mg tablet take 1 tablet by mouth every 12 hours 60 tablet 7    OneTouch Delica Lancets 33G MISC Check blood sugars twice daily. Please substitute with appropriate alternative as covered by patient's insurance. Dx: E11.65 200 each 3    Blood Glucose Monitoring Suppl (OneTouch Verio Reflect) w/Device KIT Check blood sugars twice daily. Please substitute with appropriate alternative as covered by patient's insurance. Dx: E11.65 (Patient not taking: Reported on 2024) 1 kit 0    cyclobenzaprine (FLEXERIL) 10 mg tablet Take 1 tablet (10 mg total) by mouth 3 (three) times a day as needed for muscle spasms (Patient not taking: Reported on 3/7/2023) 30 tablet 0    dulaglutide (Trulicity) 4.5 MG/0.5ML injection Inject 0.5 mL (4.5 mg total) under the skin every 7 days (Patient not taking: Reported on 2024) 15 mL 3    gabapentin (NEURONTIN) 300 mg capsule take 1 capsule by mouth three times a day (Patient not taking: Reported on 2024) 90 capsule 2     No current facility-administered medications on file prior to visit.      Social History     Tobacco Use    Smoking status: Former     Current packs/day: 0.00     Average packs/day: 2.0 packs/day for 30.0 years (60.0 ttl pk-yrs)     Types: Cigarettes     Start date:      Quit date:      Years since quittin.4    Smokeless tobacco: Former   Vaping Use    Vaping status: Never Used   Substance and Sexual Activity    Alcohol use: Not Currently    Drug use: Not Currently    Sexual activity: Not on file     Objective     /64   Pulse 61   Temp 98.3 °F (36.8 °C) (Tympanic)   Ht 5' 4\" (1.626 m)   Wt 134 kg (294 lb 6.4 oz)   SpO2 97%   BMI 50.53 kg/m²     Physical Exam  Vitals and nursing note reviewed.   Constitutional:       General: He is not in acute distress.     Appearance: He is well-developed. He is obese. He is not toxic-appearing.   HENT:      Head: Normocephalic and atraumatic.   Eyes:      Extraocular Movements: " Extraocular movements intact.      Conjunctiva/sclera: Conjunctivae normal.   Cardiovascular:      Rate and Rhythm: Normal rate and regular rhythm.      Heart sounds: No murmur heard.  Pulmonary:      Effort: Pulmonary effort is normal. No respiratory distress.      Breath sounds: Normal breath sounds.   Musculoskeletal:         General: No swelling.      Cervical back: Neck supple.   Skin:     General: Skin is warm and dry.      Capillary Refill: Capillary refill takes less than 2 seconds.   Neurological:      General: No focal deficit present.      Mental Status: He is alert.   Psychiatric:         Mood and Affect: Mood normal.         Thought Content: Thought content normal.         Judgment: Judgment normal.       Administrative Statements   I have spent a total time of 25 minutes on 06/05/24 In caring for this patient including Diagnostic results, Prognosis, Importance of tx compliance, Risk factor reductions, Impressions, Reviewing / ordering tests, medicine, procedures  , and Obtaining or reviewing history  .

## 2024-07-18 DIAGNOSIS — G89.4 CHRONIC PAIN SYNDROME: ICD-10-CM

## 2024-07-18 RX ORDER — IBUPROFEN 800 MG/1
800 TABLET ORAL EVERY 8 HOURS PRN
Qty: 90 TABLET | Refills: 1 | Status: SHIPPED | OUTPATIENT
Start: 2024-07-18

## 2024-07-18 NOTE — TELEPHONE ENCOUNTER
Patient called requesting refill for metformin. Patient made aware medication was refilled on 03.04.2024 for 60 with 5 refills to [facility]. Patient instructed to contact the pharmacy to obtain remaining refills of medication. Patient verbalized understanding.     Reason for call:   [x] Refill   [] Prior Auth  [] Other:     Office:   [x] PCP/Provider - DR Topete  [] Specialty/Provider -     Medication: ibuprofen     Dose/Frequency: 800 mg Q8H PRN    Quantity: 90 w refills    Pharmacy: [facility]     Does the patient have enough for 3 days?   [x] Yes   [] No - Send as HP to POD

## 2024-08-06 ENCOUNTER — VBI (OUTPATIENT)
Dept: ADMINISTRATIVE | Facility: OTHER | Age: 64
End: 2024-08-06

## 2024-08-06 NOTE — TELEPHONE ENCOUNTER
08/06/24 2:14 PM     Chart reviewed for Diabetic Eye Exam was/were submitted to the patient's insurance.     Magdalena Dee MA   [facility]

## 2024-08-27 DIAGNOSIS — E78.2 MIXED HYPERLIPIDEMIA: ICD-10-CM

## 2024-08-27 DIAGNOSIS — I10 BENIGN ESSENTIAL HTN: ICD-10-CM

## 2024-08-27 RX ORDER — LISINOPRIL 5 MG/1
5 TABLET ORAL DAILY
Qty: 100 TABLET | Refills: 1 | Status: SHIPPED | OUTPATIENT
Start: 2024-08-27

## 2024-08-27 RX ORDER — ATORVASTATIN CALCIUM 80 MG/1
80 TABLET, FILM COATED ORAL DAILY
Qty: 100 TABLET | Refills: 1 | Status: SHIPPED | OUTPATIENT
Start: 2024-08-27

## 2024-09-03 DIAGNOSIS — G89.4 CHRONIC PAIN SYNDROME: ICD-10-CM

## 2024-09-03 RX ORDER — IBUPROFEN 800 MG/1
800 TABLET, FILM COATED ORAL EVERY 8 HOURS PRN
Qty: 90 TABLET | Refills: 1 | Status: SHIPPED | OUTPATIENT
Start: 2024-09-03

## 2024-09-03 RX ORDER — IBUPROFEN 800 MG/1
800 TABLET, FILM COATED ORAL EVERY 8 HOURS PRN
Qty: 90 TABLET | Refills: 1 | OUTPATIENT
Start: 2024-09-03

## 2024-09-03 NOTE — TELEPHONE ENCOUNTER
Pt has started dating again and would like to know if he can have a STD/STI  testing ordered. Pt not having any issues he would just like to be checked.

## 2024-09-03 NOTE — TELEPHONE ENCOUNTER
Patient called to follow up on on blood work request for STD/STI testing.    Please advise and notify.    Thank you.

## 2024-09-04 NOTE — TELEPHONE ENCOUNTER
Patient called in and refill was confirmed.    Patient calling back about 2nd request he made for STD testing.  He requested it yesterday.  The message with nurse states:    Pt has started dating again and would like to know if he can have a STD/STI  testing ordered. Pt not having any issues he would just like to be checked.       Please review and place orders if appropriate.

## 2024-09-14 DIAGNOSIS — E11.40 TYPE 2 DIABETES MELLITUS WITH DIABETIC NEUROPATHY, WITHOUT LONG-TERM CURRENT USE OF INSULIN (HCC): ICD-10-CM

## 2024-09-16 ENCOUNTER — OFFICE VISIT (OUTPATIENT)
Dept: FAMILY MEDICINE CLINIC | Facility: CLINIC | Age: 64
End: 2024-09-16
Payer: COMMERCIAL

## 2024-09-16 VITALS
SYSTOLIC BLOOD PRESSURE: 110 MMHG | HEART RATE: 56 BPM | TEMPERATURE: 97.1 F | OXYGEN SATURATION: 98 % | WEIGHT: 262.13 LBS | BODY MASS INDEX: 44.75 KG/M2 | DIASTOLIC BLOOD PRESSURE: 68 MMHG | HEIGHT: 64 IN

## 2024-09-16 DIAGNOSIS — F17.211 NICOTINE DEPENDENCE, CIGARETTES, IN REMISSION: ICD-10-CM

## 2024-09-16 DIAGNOSIS — E11.40 TYPE 2 DIABETES MELLITUS WITH DIABETIC NEUROPATHY, WITHOUT LONG-TERM CURRENT USE OF INSULIN (HCC): Primary | ICD-10-CM

## 2024-09-16 DIAGNOSIS — F11.20 UNCOMPLICATED OPIOID DEPENDENCE (HCC): ICD-10-CM

## 2024-09-16 DIAGNOSIS — E66.01 MORBID OBESITY (HCC): ICD-10-CM

## 2024-09-16 DIAGNOSIS — Z11.3 SCREENING FOR STDS (SEXUALLY TRANSMITTED DISEASES): ICD-10-CM

## 2024-09-16 DIAGNOSIS — Z23 ENCOUNTER FOR IMMUNIZATION: ICD-10-CM

## 2024-09-16 PROCEDURE — 99214 OFFICE O/P EST MOD 30 MIN: CPT

## 2024-09-16 NOTE — PATIENT INSTRUCTIONS
"Patient Education     Type 2 diabetes   The Basics   Written by the doctors and editors at [facility]   What is type 2 diabetes? -- This is a disorder that disrupts the way the body uses sugar. It is sometimes called type 2 diabetes mellitus.  All of the cells in the body need sugar to work normally. Sugar gets into the cells with the help of a hormone called insulin. Insulin is made by the pancreas, an organ in the belly. If there is not enough insulin, or if cells in the body don't respond normally to insulin, sugar builds up in the blood. That is what happens to people with diabetes.  There are 2 different types of diabetes:   In type 1 diabetes, the pancreas makes little or no insulin.   In type 2 diabetes, the pancreas still makes some insulin, but the cells in the body stop responding normally. Eventually, the pancreas cannot make enough insulin to keep up.  Having excess body weight or obesity increases a person's risk of developing type 2 diabetes. But people without excess body weight can get diabetes, too.  What are the symptoms of type 2 diabetes? -- Type 2 diabetes usually causes no symptoms. When symptoms do happen, they include:   Needing to urinate often   Intense thirst   Blurry vision  Can diabetes lead to other health problems? -- Yes. Type 2 diabetes might not make you feel sick. But if it is not managed, it can lead to serious problems over time, such as:   Heart attacks   Strokes   Kidney disease   Vision problems (or even blindness)   Pain or loss of feeling in the hands and feet   Needing to have fingers, toes, or other body parts removed (amputated)  How do I know if I have type 2 diabetes? -- Your doctor or nurse can do a blood test. There are 2 tests that can be used for this. Both involve measuring the amount of sugar in your blood, called your \"blood sugar\" or \"blood glucose\":   One of the tests measures your blood sugar at the time the blood sample is taken. This test is done in the " "morning. You can't eat or drink anything except water for at least 8 hours before the test.   The other test shows what your average blood sugar has been for the past 2 to 3 months. This blood test is called \"hemoglobin A1C\" or just \"A1C.\" It can be checked at any time of the day, even if you have recently eaten.  How is type 2 diabetes treated? -- The goals of treatment are to manage your blood sugar and lower the risk of future problems that can happen in people with diabetes.  Treatment might include:   Lifestyle changes - This is an important part of managing diabetes. It includes eating healthy foods and getting plenty of physical activity.   Medicines - There are a few medicines that help lower blood sugar. Some people need to take pills that help the body make more insulin or that help insulin do its job. Others need insulin shots.  Depending on what medicines you take, you might need to check your blood sugar regularly at home. But not everyone with type 2 diabetes needs to do this. Your doctor or nurse will tell you if you should be checking your blood sugar, and when and how to do this.  Sometimes, people with type 2 diabetes also need medicines to help prevent problems caused by the disease. For instance, medicines used to lower blood pressure can reduce the chances of a heart attack or stroke.   General medical care - It's also important to take care of other areas of your health. This includes watching your blood pressure and cholesterol levels. You should also get certain vaccines, such as vaccines to protect against the flu and coronavirus disease 2019 (\"COVID-19\"). Some people also need a vaccine to prevent pneumonia.  Can type 2 diabetes be prevented? -- Yes. To lower your chances of getting type 2 diabetes, the most important thing you can do is eat a healthy diet and get plenty of physical activity. This can help you lose weight if you are overweight. But eating well and being active are also good " for your overall health. Even gentle activity, like walking, has benefits.  If you smoke, quitting can also lower your risk of type 2 diabetes. Quitting smoking can be difficult, but your doctor or nurse can help.  All topics are updated as new evidence becomes available and our peer review process is complete.  This topic retrieved from [employer] on: Apr 24, 2024.  Topic 56652 Version 23.0  Release: 32.3.2 - C32.113  © 2024 UpToDate, Inc. and/or its affiliates. All rights reserved.  Consumer Information Use and Disclaimer   Disclaimer: This generalized information is a limited summary of diagnosis, treatment, and/or medication information. It is not meant to be comprehensive and should be used as a tool to help the user understand and/or assess potential diagnostic and treatment options. It does NOT include all information about conditions, treatments, medications, side effects, or risks that may apply to a specific patient. It is not intended to be medical advice or a substitute for the medical advice, diagnosis, or treatment of a health care provider based on the health care provider's examination and assessment of a patient's specific and unique circumstances. Patients must speak with a health care provider for complete information about their health, medical questions, and treatment options, including any risks or benefits regarding use of medications. This information does not endorse any treatments or medications as safe, effective, or approved for treating a specific patient. UpToDate, Inc. and its affiliates disclaim any warranty or liability relating to this information or the use thereof.The use of this information is governed by the Terms of Use, available at https://www.wolterskluwer.com/en/know/clinical-effectiveness-terms. 2024© UpToDate, Inc. and its affiliates and/or licensors. All rights reserved.  Copyright   © 2024 UpToDate, Inc. and/or its affiliates. All rights reserved.

## 2024-09-16 NOTE — PROGRESS NOTES
"Ambulatory Visit  Name: Devendra Urena      : 1960      MRN: [id number]  Encounter Provider: ALEJO Gibson  Encounter Date: 2024   Encounter department: [facility]    Assessment & Plan  Type 2 diabetes mellitus with diabetic neuropathy, without long-term current use of insulin (HCC)    Lab Results   Component Value Date    HGBA1C 6.9 (H) 2024       Orders:    Albumin / creatinine urine ratio; Future    Comprehensive metabolic panel; Future    Hemoglobin A1C; Future    Lipid Panel with Direct LDL reflex; Future    TSH, 3rd generation with Free T4 reflex; Future    Morbid obesity (HCC)    Orders:    Ambulatory Referral to Plastic Surgery; Future    Uncomplicated opioid dependence (HCC)    Spine & Pain notes reviewed.       Nicotine dependence, cigarettes, in remission    Orders:    CT lung screening program; Future    Encounter for immunization    Orders:    Zoster Vaccine Recombinant IM    Screening for STDs (sexually transmitted diseases)    Patient requesting STD screenings.  Orders:    Chlamydia/GC amplified DNA by PCR; Future    Herpes I/II IgG JOE w Reflex to HSV-2; Future    RPR-Syphilis Screening (Total Syphilis IGG/IGM); Future      Depression Screening and Follow-up Plan: Patient was screened for depression during today's encounter. They screened negative with a PHQ-2 score of 0.      History of Present Illness       Presents for 3 mo DM2 f/u. Pt of Dr. Topete. My 1st encounter with the pt.    Drives a tri-axle dump truck.    States \"I feel great. I lost almost 60 lb.\"  Pt states he started his weight loss journey in July of this year. He has cut out all bread and butter, sweets, and ice cream. His heaviest weight was 340 lb. States on his home scale he is currently 255 lb. Reports using an elliptical at home for exercise.      History obtained from : patient  Review of Systems   Constitutional:  Negative for chills, fatigue and fever.   HENT:  " Negative for congestion, ear discharge, ear pain, facial swelling, rhinorrhea, sinus pressure, sinus pain, sore throat and trouble swallowing.    Eyes:  Negative for photophobia, pain and visual disturbance.   Respiratory:  Negative for cough, chest tightness, shortness of breath and wheezing.    Cardiovascular:  Negative for chest pain, palpitations and leg swelling.   Gastrointestinal:  Negative for abdominal pain, diarrhea, nausea and vomiting.   Genitourinary:  Negative for dysuria, flank pain and hematuria.   Musculoskeletal:  Negative for arthralgias, back pain, myalgias and neck pain.   Skin:  Negative for pallor and wound.   Neurological:  Negative for dizziness, syncope, weakness, numbness and headaches.   Psychiatric/Behavioral:  Negative for confusion and sleep disturbance.    All other systems reviewed and are negative.    Medical History Reviewed by provider this encounter:  Tobacco  Allergies  Meds  Problems  Med Hx  Surg Hx  Fam Hx       Current Outpatient Medications on File Prior to Visit   Medication Sig Dispense Refill    aspirin 81 MG tablet Take 1 tablet (81 mg total) by mouth daily      atorvastatin (LIPITOR) 80 mg tablet take 1 tablet by mouth once daily 100 tablet 1    Blood Glucose Monitoring Suppl (OneTouch Verio Reflect) w/Device KIT Check blood sugars twice daily. Please substitute with appropriate alternative as covered by patient's insurance. Dx: E11.65 1 kit 0    dapagliflozin (Farxiga) 5 MG TABS Take 1 tablet (5 mg total) by mouth in the morning 90 tablet 1    glucose blood (OneTouch Verio) test strip Check blood sugars twice daily. Please substitute with appropriate alternative as covered by patient's insurance. Dx: E11.65 200 each 3    ibuprofen (MOTRIN) 800 mg tablet Take 1 tablet (800 mg total) by mouth every 8 (eight) hours as needed for mild pain, moderate pain, fever or headaches 90 tablet 1    lisinopril (ZESTRIL) 5 mg tablet take 1 tablet by mouth once daily 100 tablet  1    metFORMIN (GLUCOPHAGE) 1000 MG tablet take 1 tablet by mouth twice a day with meals 60 tablet 5    metoprolol tartrate (LOPRESSOR) 50 mg tablet take 1 tablet by mouth every 12 hours 60 tablet 7    OneTouch Delica Lancets 33G MISC Check blood sugars twice daily. Please substitute with appropriate alternative as covered by patient's insurance. Dx: E11.65 200 each 3    [DISCONTINUED] Blood Glucose Monitoring Suppl (OneTouch Verio Reflect) w/Device KIT Check blood sugars twice daily. Please substitute with appropriate alternative as covered by patient's insurance. Dx: E11.65 (Patient not taking: Reported on 2/20/2024) 1 kit 0    [DISCONTINUED] cyclobenzaprine (FLEXERIL) 10 mg tablet Take 1 tablet (10 mg total) by mouth 3 (three) times a day as needed for muscle spasms (Patient not taking: Reported on 3/7/2023) 30 tablet 0    [DISCONTINUED] dulaglutide (Trulicity) 4.5 MG/0.5ML injection Inject 0.5 mL (4.5 mg total) under the skin every 7 days (Patient not taking: Reported on 6/4/2024) 15 mL 3    [DISCONTINUED] famotidine (PEPCID) 20 mg tablet take 1 tablet by mouth twice a day (Patient not taking: Reported on 9/16/2024) 60 tablet 7    [DISCONTINUED] fluticasone (FLONASE) 50 mcg/act nasal spray 2 sprays into each nostril daily (Patient not taking: Reported on 9/16/2024) 16 g 0    [DISCONTINUED] gabapentin (NEURONTIN) 300 mg capsule take 1 capsule by mouth three times a day (Patient not taking: Reported on 1/9/2024) 90 capsule 2    [DISCONTINUED] glucose blood (OneTouch Verio) test strip Check blood sugars twice daily. Please substitute with appropriate alternative as covered by patient's insurance. Dx: E11.65 (Patient not taking: Reported on 9/16/2024) 200 each 3    [DISCONTINUED] glucose monitoring kit (FREESTYLE) monitoring kit Use 1 each 2 (two) times a day (Patient not taking: Reported on 9/16/2024) 1 each 0    [DISCONTINUED] metFORMIN (GLUCOPHAGE) 1000 MG tablet take 1 tablet by mouth twice a day with meals 60  "tablet 5    [DISCONTINUED] OneTouch Delica Lancets 33G MISC Check blood sugars twice daily. Please substitute with appropriate alternative as covered by patient's insurance. Dx: E11.65 (Patient not taking: Reported on 2024) 200 each 3     No current facility-administered medications on file prior to visit.      Social History     Tobacco Use    Smoking status: Former     Current packs/day: 0.00     Average packs/day: 2.0 packs/day for 30.0 years (60.0 ttl pk-yrs)     Types: Cigarettes     Start date:      Quit date: 2010     Years since quittin.7    Smokeless tobacco: Former   Vaping Use    Vaping status: Never Used   Substance and Sexual Activity    Alcohol use: Not Currently    Drug use: Not Currently    Sexual activity: Not on file         Objective     /68   Pulse 56   Temp (!) 97.1 °F (36.2 °C)   Ht 5' 4\" (1.626 m)   Wt 119 kg (262 lb 2 oz)   SpO2 98%   BMI 44.99 kg/m²     Physical Exam  Vitals and nursing note reviewed.   Constitutional:       General: He is not in acute distress.     Appearance: Normal appearance. He is well-developed. He is obese. He is not ill-appearing.   HENT:      Head: Normocephalic.      Jaw: There is normal jaw occlusion.      Right Ear: Tympanic membrane normal.      Left Ear: Tympanic membrane normal.      Nose: Nose normal.      Mouth/Throat:      Dentition: Abnormal dentition.      Pharynx: Oropharynx is clear. No oropharyngeal exudate or posterior oropharyngeal erythema.      Comments: Poor dentition; missing teeth  Eyes:      Extraocular Movements: Extraocular movements intact.      Conjunctiva/sclera: Conjunctivae normal.   Neck:      Thyroid: No thyroid mass.      Vascular: No carotid bruit or JVD.      Trachea: Trachea and phonation normal.   Cardiovascular:      Rate and Rhythm: Normal rate and regular rhythm.      Heart sounds: S1 normal and S2 normal. No murmur heard.  Pulmonary:      Effort: Pulmonary effort is normal. No tachypnea or respiratory " distress.      Breath sounds: Normal breath sounds and air entry. No stridor. No decreased breath sounds.   Chest:      Chest wall: No tenderness.   Abdominal:      General: Bowel sounds are normal. There is no distension.      Palpations: Abdomen is soft.      Tenderness: There is no abdominal tenderness. There is no right CVA tenderness or left CVA tenderness.   Musculoskeletal:         General: No swelling.      Cervical back: Normal range of motion and neck supple.      Right lower leg: No edema.      Left lower leg: No edema.      Comments: +antalgic gait; using cane   Lymphadenopathy:      Cervical: No cervical adenopathy.   Skin:     General: Skin is warm and dry.      Capillary Refill: Capillary refill takes less than 2 seconds.      Findings: No rash.   Neurological:      General: No focal deficit present.      Mental Status: He is alert and oriented to person, place, and time.      Cranial Nerves: Cranial nerves 2-12 are intact.      Sensory: Sensation is intact.      Motor: Motor function is intact.      Coordination: Coordination is intact.      Gait: Gait is intact.   Psychiatric:         Mood and Affect: Mood normal.         Behavior: Behavior is cooperative.

## 2024-09-21 ENCOUNTER — APPOINTMENT (OUTPATIENT)
Dept: LAB | Facility: CLINIC | Age: 64
End: 2024-09-21
Payer: COMMERCIAL

## 2024-09-21 DIAGNOSIS — Z11.3 SCREENING FOR STDS (SEXUALLY TRANSMITTED DISEASES): ICD-10-CM

## 2024-09-21 DIAGNOSIS — E11.40 TYPE 2 DIABETES MELLITUS WITH DIABETIC NEUROPATHY, WITHOUT LONG-TERM CURRENT USE OF INSULIN (HCC): ICD-10-CM

## 2024-09-21 LAB
ALBUMIN SERPL BCG-MCNC: 4.5 G/DL (ref 3.5–5)
ALP SERPL-CCNC: 73 U/L (ref 34–104)
ALT SERPL W P-5'-P-CCNC: 15 U/L (ref 7–52)
ANION GAP SERPL CALCULATED.3IONS-SCNC: 12 MMOL/L (ref 4–13)
AST SERPL W P-5'-P-CCNC: 19 U/L (ref 13–39)
BILIRUB SERPL-MCNC: 1.08 MG/DL (ref 0.2–1)
BUN SERPL-MCNC: 17 MG/DL (ref 5–25)
CALCIUM SERPL-MCNC: 9.6 MG/DL (ref 8.4–10.2)
CHLORIDE SERPL-SCNC: 102 MMOL/L (ref 96–108)
CHOLEST SERPL-MCNC: 127 MG/DL
CO2 SERPL-SCNC: 23 MMOL/L (ref 21–32)
CREAT SERPL-MCNC: 0.69 MG/DL (ref 0.6–1.3)
CREAT UR-MCNC: 90.5 MG/DL
EST. AVERAGE GLUCOSE BLD GHB EST-MCNC: 126 MG/DL
GFR SERPL CREATININE-BSD FRML MDRD: 101 ML/MIN/1.73SQ M
GLUCOSE P FAST SERPL-MCNC: 89 MG/DL (ref 65–99)
HBA1C MFR BLD: 6 %
HDLC SERPL-MCNC: 37 MG/DL
LDLC SERPL CALC-MCNC: 69 MG/DL (ref 0–100)
MICROALBUMIN UR-MCNC: 17.2 MG/L
MICROALBUMIN/CREAT 24H UR: 19 MG/G CREATININE (ref 0–30)
POTASSIUM SERPL-SCNC: 3.9 MMOL/L (ref 3.5–5.3)
PROT SERPL-MCNC: 8 G/DL (ref 6.4–8.4)
SODIUM SERPL-SCNC: 137 MMOL/L (ref 135–147)
TREPONEMA PALLIDUM IGG+IGM AB [PRESENCE] IN SERUM OR PLASMA BY IMMUNOASSAY: NORMAL
TRIGL SERPL-MCNC: 104 MG/DL
TSH SERPL DL<=0.05 MIU/L-ACNC: 2.75 UIU/ML (ref 0.45–4.5)

## 2024-09-21 PROCEDURE — 86780 TREPONEMA PALLIDUM: CPT

## 2024-09-21 PROCEDURE — 86696 HERPES SIMPLEX TYPE 2 TEST: CPT

## 2024-09-21 PROCEDURE — 80061 LIPID PANEL: CPT

## 2024-09-21 PROCEDURE — 82043 UR ALBUMIN QUANTITATIVE: CPT

## 2024-09-21 PROCEDURE — 86695 HERPES SIMPLEX TYPE 1 TEST: CPT

## 2024-09-21 PROCEDURE — 87491 CHLMYD TRACH DNA AMP PROBE: CPT

## 2024-09-21 PROCEDURE — 84443 ASSAY THYROID STIM HORMONE: CPT

## 2024-09-21 PROCEDURE — 87591 N.GONORRHOEAE DNA AMP PROB: CPT

## 2024-09-21 PROCEDURE — 36415 COLL VENOUS BLD VENIPUNCTURE: CPT

## 2024-09-21 PROCEDURE — 82570 ASSAY OF URINE CREATININE: CPT

## 2024-09-21 PROCEDURE — 83036 HEMOGLOBIN GLYCOSYLATED A1C: CPT

## 2024-09-21 PROCEDURE — 80053 COMPREHEN METABOLIC PANEL: CPT

## 2024-09-23 LAB
C TRACH DNA SPEC QL NAA+PROBE: NEGATIVE
N GONORRHOEA DNA SPEC QL NAA+PROBE: NEGATIVE

## 2024-09-24 LAB
HSV2 IGG SERPL QL IA: NEGATIVE
HSV2 IGG SERPL QL IA: POSITIVE

## 2024-10-17 ENCOUNTER — TELEPHONE (OUTPATIENT)
Dept: FAMILY MEDICINE CLINIC | Facility: CLINIC | Age: 64
End: 2024-10-17

## 2024-10-17 NOTE — TELEPHONE ENCOUNTER
Telephone call to patient to tell him his 's license form is ready to be picked up.  Patient aware.

## 2024-10-22 ENCOUNTER — TELEPHONE (OUTPATIENT)
Age: 64
End: 2024-10-22

## 2024-10-22 ENCOUNTER — TELEPHONE (OUTPATIENT)
Dept: PLASTIC SURGERY | Facility: CLINIC | Age: 64
End: 2024-10-22

## 2024-10-22 NOTE — TELEPHONE ENCOUNTER
Patient has referral place din the chart to talk about getting a Pann done.     Patient has lost well over 70lbs and would like to talk to someone about starting the process for getting his excess skin removed.     Notified patient of the process and sending patients chart to [facility]

## 2024-10-30 DIAGNOSIS — G89.4 CHRONIC PAIN SYNDROME: ICD-10-CM

## 2024-10-30 RX ORDER — IBUPROFEN 800 MG/1
TABLET, FILM COATED ORAL
Qty: 90 TABLET | Refills: 1 | Status: SHIPPED | OUTPATIENT
Start: 2024-10-30

## 2024-12-09 DIAGNOSIS — I10 BENIGN ESSENTIAL HTN: ICD-10-CM

## 2024-12-10 RX ORDER — METOPROLOL TARTRATE 50 MG
50 TABLET ORAL EVERY 12 HOURS
Qty: 60 TABLET | Refills: 5 | Status: SHIPPED | OUTPATIENT
Start: 2024-12-10

## 2024-12-24 DIAGNOSIS — G89.4 CHRONIC PAIN SYNDROME: ICD-10-CM

## 2024-12-30 ENCOUNTER — OFFICE VISIT (OUTPATIENT)
Dept: FAMILY MEDICINE CLINIC | Facility: CLINIC | Age: 64
End: 2024-12-30
Payer: COMMERCIAL

## 2024-12-30 VITALS
SYSTOLIC BLOOD PRESSURE: 120 MMHG | DIASTOLIC BLOOD PRESSURE: 62 MMHG | WEIGHT: 252 LBS | BODY MASS INDEX: 43.02 KG/M2 | HEIGHT: 64 IN | OXYGEN SATURATION: 99 % | TEMPERATURE: 97.9 F | HEART RATE: 67 BPM

## 2024-12-30 DIAGNOSIS — Z23 ENCOUNTER FOR IMMUNIZATION: ICD-10-CM

## 2024-12-30 DIAGNOSIS — F17.211 NICOTINE DEPENDENCE, CIGARETTES, IN REMISSION: ICD-10-CM

## 2024-12-30 DIAGNOSIS — J06.9 ACUTE URI: ICD-10-CM

## 2024-12-30 DIAGNOSIS — E11.40 TYPE 2 DIABETES MELLITUS WITH DIABETIC NEUROPATHY, WITHOUT LONG-TERM CURRENT USE OF INSULIN (HCC): Primary | ICD-10-CM

## 2024-12-30 LAB — SL AMB POCT HEMOGLOBIN AIC: 5.5 (ref ?–6.5)

## 2024-12-30 PROCEDURE — 99214 OFFICE O/P EST MOD 30 MIN: CPT

## 2024-12-30 PROCEDURE — 83036 HEMOGLOBIN GLYCOSYLATED A1C: CPT

## 2024-12-30 RX ORDER — FAMOTIDINE 20 MG/1
TABLET, FILM COATED ORAL
COMMUNITY
Start: 2024-12-23

## 2024-12-30 RX ORDER — AMOXICILLIN 500 MG/1
500 CAPSULE ORAL EVERY 12 HOURS SCHEDULED
Qty: 14 CAPSULE | Refills: 0 | Status: SHIPPED | OUTPATIENT
Start: 2024-12-30 | End: 2025-01-06

## 2024-12-30 NOTE — PROGRESS NOTES
Name: Devendra Urena      : 1960      MRN: [id number]  Encounter Provider: ALEJO Gibson  Encounter Date: 2024   Encounter department: [facility]  :  Assessment & Plan  Type 2 diabetes mellitus with diabetic neuropathy, without long-term current use of insulin ([facility])    Lab Results   Component Value Date    HGBA1C 6.0 (H) 2024       A1c down to 5.5 at today's visit.  Excellent work. Continue to keep up with diet modifications and med compliance.  Weight is down from 322 lb at 24 visit to 252 lb today.  DM2 foot exam completed today.  Counseled.  Continue to monitor.    Orders:    Diabetic foot exam; Future    POCT hemoglobin A1c    Nicotine dependence, cigarettes, in remission    Orders:    CT lung screening program; Future    Encounter for immunization    Counseled.    Orders:    Zoster Vaccine Recombinant IM    influenza vaccine, recombinant, PF, 0.5 mL IM (Flublok)    HEPATITIS A VACCINE ADULT IM    Acute URI    Sympt x 1 week - cough, congestion, rhinorrhea  +sick contacts  Denies CP or SOB  No fevers  Mucinex & Vicks OTC without relief in sympt    Orders:    amoxicillin (AMOXIL) 500 mg capsule; Take 1 capsule (500 mg total) by mouth every 12 (twelve) hours for 7 days           History of Present Illness       Review of Systems   Constitutional:  Negative for chills, fatigue and fever.   HENT:  Positive for congestion and rhinorrhea. Negative for ear discharge, ear pain, facial swelling, sinus pressure, sinus pain, sore throat and trouble swallowing.    Eyes:  Negative for photophobia, pain and visual disturbance.   Respiratory:  Positive for cough. Negative for chest tightness, shortness of breath and wheezing.    Cardiovascular:  Negative for chest pain, palpitations and leg swelling.   Gastrointestinal:  Negative for abdominal pain, diarrhea, nausea and vomiting.   Genitourinary:  Negative for dysuria, flank pain and hematuria.   Musculoskeletal:   "Negative for arthralgias, back pain, myalgias and neck pain.   Skin:  Negative for pallor and wound.   Neurological:  Negative for dizziness, syncope, weakness, numbness and headaches.   Psychiatric/Behavioral:  Negative for confusion and sleep disturbance.    All other systems reviewed and are negative.      Objective   /62   Pulse 67   Temp 97.9 °F (36.6 °C)   Ht 5' 4\" (1.626 m)   Wt 114 kg (252 lb)   SpO2 99%   BMI 43.26 kg/m²      Physical Exam  Vitals and nursing note reviewed.   Constitutional:       General: He is not in acute distress.     Appearance: Normal appearance. He is well-developed. He is not ill-appearing.   HENT:      Head: Normocephalic and atraumatic.      Jaw: There is normal jaw occlusion.      Right Ear: Tympanic membrane normal.      Left Ear: Tympanic membrane normal.      Nose: Congestion present.      Mouth/Throat:      Pharynx: Oropharynx is clear. No oropharyngeal exudate or posterior oropharyngeal erythema.   Eyes:      Extraocular Movements: Extraocular movements intact.      Conjunctiva/sclera: Conjunctivae normal.   Neck:      Thyroid: No thyroid mass.      Vascular: No carotid bruit or JVD.      Trachea: Trachea and phonation normal.   Cardiovascular:      Rate and Rhythm: Normal rate and regular rhythm.      Pulses: no weak pulses.           Dorsalis pedis pulses are 2+ on the right side and 2+ on the left side.        Posterior tibial pulses are 2+ on the right side and 2+ on the left side.      Heart sounds: S1 normal and S2 normal. No murmur heard.  Pulmonary:      Effort: Pulmonary effort is normal. No tachypnea or respiratory distress.      Breath sounds: Normal breath sounds and air entry. No stridor. No decreased breath sounds.      Comments: +cough on exam  Chest:      Chest wall: No tenderness.   Abdominal:      General: Bowel sounds are normal. There is no distension.      Palpations: Abdomen is soft.      Tenderness: There is no abdominal tenderness. There " is no right CVA tenderness or left CVA tenderness.   Musculoskeletal:         General: No swelling.      Cervical back: Normal range of motion and neck supple.      Right lower leg: No edema.      Left lower leg: No edema.   Feet:      Right foot:      Skin integrity: No ulcer, skin breakdown, erythema, warmth, callus or dry skin.      Left foot:      Skin integrity: No ulcer, skin breakdown, erythema, warmth, callus or dry skin.   Lymphadenopathy:      Cervical: No cervical adenopathy.   Skin:     General: Skin is warm and dry.      Capillary Refill: Capillary refill takes less than 2 seconds.      Findings: No rash.   Neurological:      General: No focal deficit present.      Mental Status: He is alert and oriented to person, place, and time.      Cranial Nerves: Cranial nerves 2-12 are intact.      Sensory: Sensation is intact.      Motor: Motor function is intact.      Coordination: Coordination is intact.      Gait: Gait is intact.   Psychiatric:         Mood and Affect: Mood normal.         Behavior: Behavior is cooperative.       Diabetic Foot Exam    Patient's shoes and socks removed.    Right Foot/Ankle   Right Foot Inspection  Skin Exam: skin normal and skin intact. No dry skin, no warmth, no callus, no erythema, no maceration, no abnormal color, no pre-ulcer, no ulcer and no callus.     Toe Exam: ROM and strength within normal limits.     Sensory   Monofilament testing: intact    Vascular  Capillary refills: < 3 seconds  The right DP pulse is 2+. The right PT pulse is 2+.     Left Foot/Ankle  Left Foot Inspection  Skin Exam: skin normal and skin intact. No dry skin, no warmth, no erythema, no maceration, normal color, no pre-ulcer, no ulcer and no callus.     Toe Exam: ROM and strength within normal limits.     Sensory   Monofilament testing: intact    Vascular  Capillary refills: < 3 seconds  The left DP pulse is 2+. The left PT pulse is 2+.     Assign Risk Category  No deformity present  No loss of  protective sensation  No weak pulses  Risk: 0

## 2025-01-02 RX ORDER — IBUPROFEN 800 MG/1
TABLET, FILM COATED ORAL
Qty: 90 TABLET | Refills: 1 | Status: SHIPPED | OUTPATIENT
Start: 2025-01-02

## 2025-01-10 ENCOUNTER — HOSPITAL ENCOUNTER (OUTPATIENT)
Dept: CT IMAGING | Facility: HOSPITAL | Age: 65
Discharge: HOME | End: 2025-01-10

## 2025-01-10 DIAGNOSIS — F17.211 NICOTINE DEPENDENCE, CIGARETTES, IN REMISSION: ICD-10-CM

## 2025-01-22 DIAGNOSIS — K21.9 GASTROESOPHAGEAL REFLUX DISEASE, UNSPECIFIED WHETHER ESOPHAGITIS PRESENT: Primary | ICD-10-CM

## 2025-01-22 RX ORDER — FAMOTIDINE 20 MG/1
20 TABLET, FILM COATED ORAL 2 TIMES DAILY
Qty: 60 TABLET | Refills: 2 | Status: SHIPPED | OUTPATIENT
Start: 2025-01-22

## 2025-02-21 DIAGNOSIS — I10 BENIGN ESSENTIAL HTN: ICD-10-CM

## 2025-02-21 RX ORDER — LISINOPRIL 5 MG/1
5 TABLET ORAL DAILY
Qty: 100 TABLET | Refills: 1 | Status: SHIPPED | OUTPATIENT
Start: 2025-02-21

## 2025-04-05 DIAGNOSIS — E11.40 TYPE 2 DIABETES MELLITUS WITH DIABETIC NEUROPATHY, WITHOUT LONG-TERM CURRENT USE OF INSULIN (HCC): ICD-10-CM

## 2025-04-22 DIAGNOSIS — G89.4 CHRONIC PAIN SYNDROME: ICD-10-CM

## 2025-04-22 RX ORDER — IBUPROFEN 800 MG/1
TABLET, FILM COATED ORAL
Qty: 90 TABLET | Refills: 1 | Status: SHIPPED | OUTPATIENT
Start: 2025-04-22

## 2025-04-22 NOTE — TELEPHONE ENCOUNTER
Reason for call:   [x] Refill   [] Prior Auth  [] Other:     Office:   [x] PCP/Provider -   Ordering Department: [facility]  Authorized By: Willie Topete DO  [] Specialty/Provider -     Medication: ibuprofen (MOTRIN) 800 mg tablet     Dose/Frequency:  TAKE 1 TABLET BY MOUTH EVERY 8 HOURS AS NEEDED FOR MILD PAIN,MODERATE PAIN, FEVER OR HEADACHES     Quantity: 90    Pharmacy: [id number]  [place] - [address] [phone]    Local Pharmacy   Does the patient have enough for 3 days?   [] Yes   [x] No - Send as HP to POD    Mail Away Pharmacy   Does the patient have enough for 10 days?   [] Yes   [x] No - Send as HP to POD

## 2025-04-30 DIAGNOSIS — K21.9 GASTROESOPHAGEAL REFLUX DISEASE, UNSPECIFIED WHETHER ESOPHAGITIS PRESENT: ICD-10-CM

## 2025-04-30 RX ORDER — FAMOTIDINE 20 MG/1
20 TABLET, FILM COATED ORAL 2 TIMES DAILY
Qty: 60 TABLET | Refills: 5 | Status: SHIPPED | OUTPATIENT
Start: 2025-04-30

## 2025-05-06 ENCOUNTER — TELEPHONE (OUTPATIENT)
Dept: FAMILY MEDICINE CLINIC | Facility: CLINIC | Age: 65
End: 2025-05-06

## 2025-06-07 ENCOUNTER — APPOINTMENT (OUTPATIENT)
Dept: LAB | Facility: CLINIC | Age: 65
End: 2025-06-07
Payer: COMMERCIAL

## 2025-06-07 DIAGNOSIS — Z01.818 OTHER SPECIFIED PRE-OPERATIVE EXAMINATION: ICD-10-CM

## 2025-06-07 LAB
ALBUMIN SERPL BCG-MCNC: 4.1 G/DL (ref 3.5–5)
ALP SERPL-CCNC: 86 U/L (ref 34–104)
ALT SERPL W P-5'-P-CCNC: 11 U/L (ref 7–52)
ANION GAP SERPL CALCULATED.3IONS-SCNC: 10 MMOL/L (ref 4–13)
AST SERPL W P-5'-P-CCNC: 13 U/L (ref 13–39)
BASOPHILS # BLD AUTO: 0.06 THOUSANDS/ÂΜL (ref 0–0.1)
BASOPHILS NFR BLD AUTO: 1 % (ref 0–1)
BILIRUB SERPL-MCNC: 0.61 MG/DL (ref 0.2–1)
BUN SERPL-MCNC: 26 MG/DL (ref 5–25)
CALCIUM SERPL-MCNC: 9.2 MG/DL (ref 8.4–10.2)
CHLORIDE SERPL-SCNC: 105 MMOL/L (ref 96–108)
CO2 SERPL-SCNC: 21 MMOL/L (ref 21–32)
CREAT SERPL-MCNC: 0.84 MG/DL (ref 0.6–1.3)
EOSINOPHIL # BLD AUTO: 0.38 THOUSAND/ÂΜL (ref 0–0.61)
EOSINOPHIL NFR BLD AUTO: 4 % (ref 0–6)
ERYTHROCYTE [DISTWIDTH] IN BLOOD BY AUTOMATED COUNT: 13.2 % (ref 11.6–15.1)
GFR SERPL CREATININE-BSD FRML MDRD: 92 ML/MIN/1.73SQ M
GLUCOSE P FAST SERPL-MCNC: 106 MG/DL (ref 65–99)
HCT VFR BLD AUTO: 41.7 % (ref 36.5–49.3)
HGB BLD-MCNC: 13.7 G/DL (ref 12–17)
IMM GRANULOCYTES # BLD AUTO: 0.02 THOUSAND/UL (ref 0–0.2)
IMM GRANULOCYTES NFR BLD AUTO: 0 % (ref 0–2)
LYMPHOCYTES # BLD AUTO: 2.02 THOUSANDS/ÂΜL (ref 0.6–4.47)
LYMPHOCYTES NFR BLD AUTO: 23 % (ref 14–44)
MCH RBC QN AUTO: 29.6 PG (ref 26.8–34.3)
MCHC RBC AUTO-ENTMCNC: 32.9 G/DL (ref 31.4–37.4)
MCV RBC AUTO: 90 FL (ref 82–98)
MONOCYTES # BLD AUTO: 0.76 THOUSAND/ÂΜL (ref 0.17–1.22)
MONOCYTES NFR BLD AUTO: 9 % (ref 4–12)
NEUTROPHILS # BLD AUTO: 5.63 THOUSANDS/ÂΜL (ref 1.85–7.62)
NEUTS SEG NFR BLD AUTO: 63 % (ref 43–75)
NRBC BLD AUTO-RTO: 0 /100 WBCS
PLATELET # BLD AUTO: 276 THOUSANDS/UL (ref 149–390)
PMV BLD AUTO: 10.9 FL (ref 8.9–12.7)
POTASSIUM SERPL-SCNC: 4.6 MMOL/L (ref 3.5–5.3)
PROT SERPL-MCNC: 7.4 G/DL (ref 6.4–8.4)
RBC # BLD AUTO: 4.63 MILLION/UL (ref 3.88–5.62)
SODIUM SERPL-SCNC: 136 MMOL/L (ref 135–147)
WBC # BLD AUTO: 8.87 THOUSAND/UL (ref 4.31–10.16)

## 2025-06-07 PROCEDURE — 85025 COMPLETE CBC W/AUTO DIFF WBC: CPT

## 2025-06-07 PROCEDURE — 80053 COMPREHEN METABOLIC PANEL: CPT

## 2025-06-07 PROCEDURE — 36415 COLL VENOUS BLD VENIPUNCTURE: CPT

## 2025-06-09 ENCOUNTER — CONSULT (OUTPATIENT)
Dept: FAMILY MEDICINE CLINIC | Facility: CLINIC | Age: 65
End: 2025-06-09
Payer: COMMERCIAL

## 2025-06-09 VITALS
HEIGHT: 64 IN | BODY MASS INDEX: 47.29 KG/M2 | WEIGHT: 277 LBS | DIASTOLIC BLOOD PRESSURE: 80 MMHG | HEART RATE: 71 BPM | OXYGEN SATURATION: 97 % | SYSTOLIC BLOOD PRESSURE: 130 MMHG | TEMPERATURE: 97.4 F

## 2025-06-09 DIAGNOSIS — E11.40 TYPE 2 DIABETES MELLITUS WITH DIABETIC NEUROPATHY, WITHOUT LONG-TERM CURRENT USE OF INSULIN (HCC): ICD-10-CM

## 2025-06-09 DIAGNOSIS — Z01.818 PRE-OP EVALUATION: Primary | ICD-10-CM

## 2025-06-09 DIAGNOSIS — E78.2 MIXED HYPERLIPIDEMIA: ICD-10-CM

## 2025-06-09 DIAGNOSIS — Z12.5 SCREENING FOR PROSTATE CANCER: ICD-10-CM

## 2025-06-09 PROBLEM — F11.20 UNCOMPLICATED OPIOID DEPENDENCE (HCC): Status: RESOLVED | Noted: 2021-11-26 | Resolved: 2025-06-09

## 2025-06-09 LAB — SL AMB POCT HEMOGLOBIN AIC: 5.8 (ref ?–6.5)

## 2025-06-09 PROCEDURE — 83036 HEMOGLOBIN GLYCOSYLATED A1C: CPT | Performed by: NURSE PRACTITIONER

## 2025-06-09 PROCEDURE — 99213 OFFICE O/P EST LOW 20 MIN: CPT | Performed by: NURSE PRACTITIONER

## 2025-06-09 RX ORDER — ATORVASTATIN CALCIUM 80 MG/1
80 TABLET, FILM COATED ORAL DAILY
Qty: 100 TABLET | Refills: 2 | Status: SHIPPED | OUTPATIENT
Start: 2025-06-09

## 2025-06-09 RX ORDER — MUPIROCIN 20 MG/G
OINTMENT TOPICAL
COMMUNITY
Start: 2025-06-09

## 2025-06-09 RX ORDER — CHLORHEXIDINE GLUCONATE 40 MG/ML
SOLUTION TOPICAL DAILY PRN
COMMUNITY
Start: 2025-05-23

## 2025-06-09 NOTE — PROGRESS NOTES
Pre-operative Clearance  Name: Devendra Urena      : 1960      MRN: [id number]  Encounter Provider: ALEJO Ruano  Encounter Date: 2025   Encounter department: [facility]    :  Assessment & Plan  Pre-op evaluation  [facility]   Orders:  •  POCT hemoglobin A1c    Type 2 diabetes mellitus with diabetic neuropathy, without long-term current use of insulin ([facility])    Lab Results   Component Value Date    HGBA1C 5.8 2025   Very well controlled at this time. He has not been on the Farxiga as he never had refills of it.   Counseled on diet and exercise, recommend weight loss.     Orders:  •  Albumin / creatinine urine ratio; Future  •  Comprehensive metabolic panel; Future  •  Hemoglobin A1C; Future  •  CBC and differential; Future  •  Lipid Panel with Direct LDL reflex; Future  •  TSH, 3rd generation with Free T4 reflex; Future    Mixed hyperlipidemia    Orders:  •  atorvastatin (LIPITOR) 80 mg tablet; Take 1 tablet (80 mg total) by mouth daily    Screening for prostate cancer    Orders:  •  PSA, Total Screen; Future        Pre-operative Clearance:     Revised Cardiac Risk Index:  RCI RISK CLASS II (1 risk factor, risk of major cardiac complications approximately 1.3%)    Medication Instructions:   - Avoid herbs or non-directed vitamins one week prior to surgery    - Avoid aspirin containing medications or non-steroidal anti-inflammatory drugs one week preceding surgery    - May take tylenol for pain up until the night before surgery    - ACE Inhibitors or ARBs: Continue this medication up to the evening before surgery/procedure, but do not take the morning of the day of surgery.  - Beta blockers:  Continue to take this medication on your normal schedule.  - H2 Blocker: Continue to take this medication on your normal schedule.  - Hyperlipidemia meds: Continue to take this medication on your normal  schedule.      Medicine Instructions for Adults with Diabetes (NO Bowel Prep)    Follow these instructions when a BOWEL PREP is NOT required for your procedure or surgery!    NOTE:  GLP Agonists taken weekly: do not take in the 7 days before your procedure. **Bariatric surgery: do not take 4 weeks prior to your procedure.    SGLT-2 Inhibitors: do not take in the 4 days before your procedure    On the Day Before Surgery/Procedure  If you are having a procedure (e.g., Colonoscopy) or surgery which DOES NOT require a bowel prep, follow the directions below based on the type of medicine you take for your diabetes.  Type of Medicine You Take Examples What to Do   Pre-Mixed Insulin Intermediate  Kirlmgy37/25, Duwowig69/30, Novolog 70/30, Regular Insulin Take 1/2 your regular dose the evening before our procedure.   Rapid/Fast Acting  Insulin and/or Long-Acting Insulin Humalog U200, NovoLog, Apidra,  Lantus, Levemir, Tresiba, Toujeo,  Fias, Basaglar Take your FULL regular dose the day before procedure.   Oral Diabetic Medicines (sulfonylurea) Glipizide/Glimepiride/  Glucotrol Take your regular dose with dinner the evening before your procedure.   Other Oral Diabetic Medicines Metformin, Glucophage, Glucophage  XR, Riomet, Glumetza, Actose,  Avandia, Gl set, Prandin Take your regular dose with dinner the evening before your procedure   GLP Agonists Adlyxin, Byetta, Bydureon,  Ozempic, Soliqua, Tanzeum,  Trulicity, Victoza, Saxenda,  Rybelsus, Wegovy, Mounjaro, Zepbound If taken daily, take as normal  If taken weekly, do not take this medicine for 7 days before your procedure including the day of the procedure (resume taking after the procedure). **Bariatric surgery: do not take 4 weeks prior to procedure   SGLT-2 Inhibitors Jardiance, Invokana, Farxiga, Steglatro, Brenzavvy, Qtern, Segluromet Glyxambi, Synjardy, Synjardy XR, Invokamet, InvokametXR, Trijary XR, Xigduo X Do not take for 4 days before your procedure including  the day of the procedure (resume taking after the procedure)   This educational material has been approved by the Patient Education Advisory Committee.    On the Day of Surgery/Procedure  Follow the directions below based on the type of medicine you take for your diabetes.  Type of Medicine You Take  Examples What to Do   Long-Acting Insulin Lantus, Levemir, Tresiba,  Toujeo, Basaglar, Semglee If you normally take your Long Acting Insulin in the morning, take the full dose as scheduled.   GLP-I Agonists Adlyxin, Byetta, Bydureon,  Ozempic, Soliqua, Tanzeum,  Trulicity, Victoza, Saxenda,  Rybelsus, Mounjaro Do NOT take this medicine on the day of your procedure (resume taking after the procedure)   Except for the morning Long-Acting Insulin, DO NOT take ANY diabetic medicine on the day of your procedure unless you were instructed by the doctor who manages your diabetes medicines.  Continue to check your blood sugars.  If you have an insulin pump, ask your endocrinologist for instructions at least 3 days before your procedure. NOTE: If you are not able to ask your endocrinologist in advance, on the day of the procedure set your insulin pump to your basal rate only. Bring your insulin pump supplies to the hospital.         History of Present Illness     Pre-Op Examination     Surgery: Hip Replacement   Anticipated Date of Surgery: 6/30/2025   Surgeon: Dr. Lind     Previous history of bleeding disorders or clots?: No    Previous Anesthesia reaction?: No    Prolonged steroid use in the last 6 months?: No      Assessment of Cardiac Risk:   - Unstable or severe angina or MI in the last 6 weeks or history of stent placement in the last year?: No    - Decompensated heart failure (e.g. New onset heart failure, NYHA  Class IV heart failure, or worsening existing heart failure)?: No    - Significant arrhythmias such as high grade AV block, symptomatic ventricular arrhythmia, newly recognized ventricular tachycardia,  "supraventricular tachycardia with resting heart rate >100, or symptomatic bradycardia?: No    - Severe heart valve disease including aortic stenosis or symptomatic mitral stenosis?: No      Pre-operative Risk Factors:  - Elevated-risk surgery: No    - History of cerebrovascular disease: No    - History of ischemic heart disease: Yes    - History of congestive heart failure: No    - Pre-operative treatment with insulin: No    - Pre-operative creatinine >2 mg/dL: No      Medications of Perioperative Concern:    Anti-platelet (aspirin, clopidogrel, ticagrelor, prasugrel, cilostazol, dipyridamole)    Review of Systems   Constitutional:  Negative for chills and fever.   HENT:  Negative for ear pain and sore throat.    Eyes:  Negative for pain and visual disturbance.   Respiratory:  Negative for cough and shortness of breath.    Cardiovascular:  Negative for chest pain and palpitations.   Gastrointestinal:  Negative for abdominal pain, constipation, diarrhea, nausea and vomiting.   Genitourinary:  Negative for dysuria and hematuria.   Musculoskeletal:  Negative for arthralgias and back pain.   Skin:  Negative for color change and rash.   Neurological:  Negative for seizures and syncope.   All other systems reviewed and are negative.    Past Medical History   Past Medical History[1]  Past Surgical History[2]  Family History[3]  Social History[4]  Medications[5]  No Known Allergies  Objective   /80   Pulse 71   Temp (!) 97.4 °F (36.3 °C)   Ht 5' 4\" (1.626 m)   Wt 126 kg (277 lb)   SpO2 97%   BMI 47.55 kg/m²     Physical Exam  Constitutional:       General: He is not in acute distress.     Appearance: Normal appearance. He is not ill-appearing or toxic-appearing.   HENT:      Head: Normocephalic and atraumatic.      Right Ear: Tympanic membrane, ear canal and external ear normal. There is no impacted cerumen.      Left Ear: Tympanic membrane, ear canal and external ear normal. There is no impacted cerumen.      " Nose: Nose normal. No congestion or rhinorrhea.      Mouth/Throat:      Mouth: Mucous membranes are moist.      Pharynx: Oropharynx is clear. No oropharyngeal exudate or posterior oropharyngeal erythema.     Eyes:      General: No scleral icterus.        Right eye: No discharge.         Left eye: No discharge.      Conjunctiva/sclera: Conjunctivae normal.      Pupils: Pupils are equal, round, and reactive to light.       Cardiovascular:      Rate and Rhythm: Normal rate and regular rhythm.      Pulses: Normal pulses.      Heart sounds: Normal heart sounds. No murmur heard.     No friction rub. No gallop.   Pulmonary:      Effort: Pulmonary effort is normal. No respiratory distress.      Breath sounds: Normal breath sounds. No stridor. No wheezing, rhonchi or rales.   Abdominal:      General: Abdomen is flat. Bowel sounds are normal. There is no distension.      Palpations: Abdomen is soft. There is no mass.      Tenderness: There is no abdominal tenderness.     Musculoskeletal:      Cervical back: Normal range of motion and neck supple. No rigidity. No muscular tenderness.   Lymphadenopathy:      Cervical: No cervical adenopathy.     Skin:     General: Skin is warm and dry.      Capillary Refill: Capillary refill takes less than 2 seconds.     Neurological:      General: No focal deficit present.      Mental Status: He is alert and oriented to person, place, and time. Mental status is at baseline.     Psychiatric:         Mood and Affect: Mood normal.         Behavior: Behavior normal.         Thought Content: Thought content normal.         Judgment: Judgment normal.           ALEJO Ruano         [1]  Past Medical History:  Diagnosis Date   • CAD (coronary artery disease)     s/p CABG x 2 LIMA-LAD, VG- OM1 8/5/2013   • Carotid duplex 08/05/2013    20-49% bilateral stenosis   • Diabetes (HCC)    • History of echocardiogram 12/22/2016    EF 55% Mild LVH. Mild MR   • Hyperlipidemia    • Hypertension     [2]  Past Surgical History:  Procedure Laterality Date   • CARDIAC CATHETERIZATION  08/02/2013    EF 50% Severe left main disease 90%, OM1 99%, 100% RCA (bypass scheduled)   • CORONARY ARTERY BYPASS GRAFT  08/05/2013    CABG X2 LIMA-LAD, VG-OM1   • WOUND DEBRIDEMENT Right 8/12/2020    Procedure: EXCISIONAL DEBRIDEMENT Right pannus/groin;  Surgeon: Anita Mccord MD;  Location: [facility];  Service: Trauma   • WOUND DEBRIDEMENT Right 8/13/2020    Procedure: EXCISIONAL DEBRIDEMENT, placement of drain and closure of wound;  Surgeon: Cornelius Bond DO;  Location: [facility];  Service: General   [3]  Family History  Problem Relation Name Age of Onset   • Diabetes Mother     • Heart disease Mother     • Stroke Mother     • Obesity Mother     • Diabetes Brother     • Heart disease Brother     • Obesity Brother     • Cancer Neg Hx     [4]  Social History  Tobacco Use   • Smoking status: Former     Current packs/day: 0.00     Average packs/day: 2.0 packs/day for 30.0 years (60.0 ttl pk-yrs)     Types: Cigarettes     Start date: 1980     Quit date: 2010     Years since quitting: 15.4   • Smokeless tobacco: Former   Vaping Use   • Vaping status: Never Used   Substance and Sexual Activity   • Alcohol use: Not Currently   • Drug use: Not Currently   [5]  Current Outpatient Medications on File Prior to Visit   Medication Sig   • aspirin 81 MG tablet Take 1 tablet (81 mg total) by mouth daily   • Blood Glucose Monitoring Suppl (OneTouch Verio Reflect) w/Device KIT Check blood sugars twice daily. Please substitute with appropriate alternative as covered by patient's insurance. Dx: E11.65   • CeleBREX 200 MG capsule Take 1 capsule daily for 3 days prior to surgery. DO NOT TAKE ON DAY OF SURGERY. Take 1 capsule daily for 30 days post operatively.   • chlorhexidine gluconate (Hibiclens) 4 % external liquid Apply topically daily as needed   • famotidine (PEPCID) 20 mg tablet take 1 tablet by mouth twice a day   • glucose blood  (OneTouch Verio) test strip Check blood sugars twice daily. Please substitute with appropriate alternative as covered by patient's insurance. Dx: E11.65   • ibuprofen (MOTRIN) 800 mg tablet TAKE 1 TABLET BY MOUTH EVERY 8 HOURS AS NEEDED FOR MILD PAIN,MODERATE PAIN, FEVER OR HEADACHES   • lisinopril (ZESTRIL) 5 mg tablet take 1 tablet by mouth once daily   • metFORMIN (GLUCOPHAGE) 1000 MG tablet take 1 tablet by mouth twice a day with meals   • metoprolol tartrate (LOPRESSOR) 50 mg tablet take 1 tablet by mouth every 12 hours   • mupirocin (BACTROBAN) 2 % ointment    • OneTouch Delica Lancets 33G MISC Check blood sugars twice daily. Please substitute with appropriate alternative as covered by patient's insurance. Dx: E11.65

## 2025-06-11 ENCOUNTER — TELEPHONE (OUTPATIENT)
Dept: FAMILY MEDICINE CLINIC | Facility: CLINIC | Age: 65
End: 2025-06-11

## 2025-06-11 NOTE — TELEPHONE ENCOUNTER
----- Message from ALEJO Rouse sent at 6/11/2025  7:30 AM EDT -----  Please let patient know I completed his note and he is cleared. See if he needs my note sent anywhere as he did not give me anything to sign. Thanks

## 2025-06-12 ENCOUNTER — TELEPHONE (OUTPATIENT)
Age: 65
End: 2025-06-12

## 2025-06-12 NOTE — TELEPHONE ENCOUNTER
Bria from [facility] called requesting patients medical clearance to be sent to fax number [phone].    Bria can be reached at [phone] option 4

## 2025-06-13 NOTE — TELEPHONE ENCOUNTER
Received call back with a different fax # [phone]. Please fax clearance to that number because they are not receiving the fax.

## 2025-08-16 ENCOUNTER — HOSPITAL ENCOUNTER (EMERGENCY)
Facility: HOSPITAL | Age: 65
Discharge: HOME | End: 2025-08-16
Attending: STUDENT IN AN ORGANIZED HEALTH CARE EDUCATION/TRAINING PROGRAM | Admitting: STUDENT IN AN ORGANIZED HEALTH CARE EDUCATION/TRAINING PROGRAM
Payer: COMMERCIAL

## 2025-08-16 ENCOUNTER — APPOINTMENT (EMERGENCY)
Dept: ULTRASOUND IMAGING | Facility: HOSPITAL | Age: 65
End: 2025-08-16
Payer: COMMERCIAL

## 2025-08-16 VITALS
OXYGEN SATURATION: 98 % | RESPIRATION RATE: 16 BRPM | TEMPERATURE: 98.6 F | SYSTOLIC BLOOD PRESSURE: 128 MMHG | DIASTOLIC BLOOD PRESSURE: 73 MMHG | WEIGHT: 273 LBS | HEIGHT: 64 IN | HEART RATE: 92 BPM | BODY MASS INDEX: 46.61 KG/M2

## 2025-08-16 DIAGNOSIS — N45.1 EPIDIDYMITIS: Primary | ICD-10-CM

## 2025-08-16 LAB
ANION GAP SERPL CALCULATED.3IONS-SCNC: 9 MMOL/L (ref 4–13)
BACTERIA UR QL AUTO: ABNORMAL /HPF
BASOPHILS # BLD AUTO: 0.05 THOUSANDS/ÂΜL (ref 0–0.1)
BASOPHILS NFR BLD AUTO: 0 % (ref 0–1)
BILIRUB UR QL STRIP: NEGATIVE
BUN SERPL-MCNC: 20 MG/DL (ref 5–25)
CALCIUM SERPL-MCNC: 9.8 MG/DL (ref 8.4–10.2)
CHLORIDE SERPL-SCNC: 105 MMOL/L (ref 96–108)
CLARITY UR: ABNORMAL
CO2 SERPL-SCNC: 21 MMOL/L (ref 21–32)
COLOR UR: YELLOW
CREAT SERPL-MCNC: 0.89 MG/DL (ref 0.6–1.3)
EOSINOPHIL # BLD AUTO: 0.15 THOUSAND/ÂΜL (ref 0–0.61)
EOSINOPHIL NFR BLD AUTO: 1 % (ref 0–6)
ERYTHROCYTE [DISTWIDTH] IN BLOOD BY AUTOMATED COUNT: 13 % (ref 11.6–15.1)
GFR SERPL CREATININE-BSD FRML MDRD: 90 ML/MIN/1.73SQ M
GLUCOSE SERPL-MCNC: 166 MG/DL (ref 65–140)
GLUCOSE UR STRIP-MCNC: NEGATIVE MG/DL
HCT VFR BLD AUTO: 37.9 % (ref 36.5–49.3)
HGB BLD-MCNC: 12 G/DL (ref 12–17)
HGB UR QL STRIP.AUTO: ABNORMAL
IMM GRANULOCYTES # BLD AUTO: 0.31 THOUSAND/UL (ref 0–0.2)
IMM GRANULOCYTES NFR BLD AUTO: 2 % (ref 0–2)
KETONES UR STRIP-MCNC: NEGATIVE MG/DL
LEUKOCYTE ESTERASE UR QL STRIP: ABNORMAL
LYMPHOCYTES # BLD AUTO: 1.2 THOUSANDS/ÂΜL (ref 0.6–4.47)
LYMPHOCYTES NFR BLD AUTO: 7 % (ref 14–44)
MCH RBC QN AUTO: 28.6 PG (ref 26.8–34.3)
MCHC RBC AUTO-ENTMCNC: 31.7 G/DL (ref 31.4–37.4)
MCV RBC AUTO: 91 FL (ref 82–98)
MONOCYTES # BLD AUTO: 1.21 THOUSAND/ÂΜL (ref 0.17–1.22)
MONOCYTES NFR BLD AUTO: 7 % (ref 4–12)
NEUTROPHILS # BLD AUTO: 13.98 THOUSANDS/ÂΜL (ref 1.85–7.62)
NEUTS SEG NFR BLD AUTO: 83 % (ref 43–75)
NITRITE UR QL STRIP: NEGATIVE
NON-SQ EPI CELLS URNS QL MICRO: ABNORMAL /HPF
NRBC BLD AUTO-RTO: 0 /100 WBCS
PH UR STRIP.AUTO: 6 [PH]
PLATELET # BLD AUTO: 348 THOUSANDS/UL (ref 149–390)
PMV BLD AUTO: 9.4 FL (ref 8.9–12.7)
POTASSIUM SERPL-SCNC: 4.4 MMOL/L (ref 3.5–5.3)
PROT UR STRIP-MCNC: ABNORMAL MG/DL
RBC # BLD AUTO: 4.19 MILLION/UL (ref 3.88–5.62)
RBC #/AREA URNS AUTO: ABNORMAL /HPF
SODIUM SERPL-SCNC: 135 MMOL/L (ref 135–147)
SP GR UR STRIP.AUTO: 1.02
UROBILINOGEN UR QL STRIP.AUTO: 0.2 E.U./DL
WBC # BLD AUTO: 16.9 THOUSAND/UL (ref 4.31–10.16)
WBC #/AREA URNS AUTO: ABNORMAL /HPF

## 2025-08-16 PROCEDURE — 87086 URINE CULTURE/COLONY COUNT: CPT | Performed by: STUDENT IN AN ORGANIZED HEALTH CARE EDUCATION/TRAINING PROGRAM

## 2025-08-16 PROCEDURE — 80048 BASIC METABOLIC PNL TOTAL CA: CPT | Performed by: STUDENT IN AN ORGANIZED HEALTH CARE EDUCATION/TRAINING PROGRAM

## 2025-08-16 PROCEDURE — 87186 SC STD MICRODIL/AGAR DIL: CPT | Performed by: STUDENT IN AN ORGANIZED HEALTH CARE EDUCATION/TRAINING PROGRAM

## 2025-08-16 PROCEDURE — 81001 URINALYSIS AUTO W/SCOPE: CPT | Performed by: STUDENT IN AN ORGANIZED HEALTH CARE EDUCATION/TRAINING PROGRAM

## 2025-08-16 PROCEDURE — 76870 US EXAM SCROTUM: CPT

## 2025-08-16 PROCEDURE — 96374 THER/PROPH/DIAG INJ IV PUSH: CPT

## 2025-08-16 PROCEDURE — 99284 EMERGENCY DEPT VISIT MOD MDM: CPT

## 2025-08-16 PROCEDURE — 36415 COLL VENOUS BLD VENIPUNCTURE: CPT | Performed by: STUDENT IN AN ORGANIZED HEALTH CARE EDUCATION/TRAINING PROGRAM

## 2025-08-16 PROCEDURE — 85025 COMPLETE CBC W/AUTO DIFF WBC: CPT | Performed by: STUDENT IN AN ORGANIZED HEALTH CARE EDUCATION/TRAINING PROGRAM

## 2025-08-16 PROCEDURE — 99285 EMERGENCY DEPT VISIT HI MDM: CPT | Performed by: STUDENT IN AN ORGANIZED HEALTH CARE EDUCATION/TRAINING PROGRAM

## 2025-08-16 PROCEDURE — 87077 CULTURE AEROBIC IDENTIFY: CPT | Performed by: STUDENT IN AN ORGANIZED HEALTH CARE EDUCATION/TRAINING PROGRAM

## 2025-08-16 RX ORDER — MORPHINE SULFATE 4 MG/ML
4 INJECTION, SOLUTION INTRAMUSCULAR; INTRAVENOUS ONCE
Status: COMPLETED | OUTPATIENT
Start: 2025-08-16 | End: 2025-08-16

## 2025-08-16 RX ORDER — CIPROFLOXACIN 500 MG/1
500 TABLET, FILM COATED ORAL ONCE
Status: COMPLETED | OUTPATIENT
Start: 2025-08-16 | End: 2025-08-16

## 2025-08-16 RX ORDER — CIPROFLOXACIN 500 MG/1
500 TABLET, FILM COATED ORAL 2 TIMES DAILY
Qty: 20 TABLET | Refills: 0 | Status: SHIPPED | OUTPATIENT
Start: 2025-08-16 | End: 2025-08-26

## 2025-08-16 RX ADMIN — MORPHINE SULFATE 4 MG: 4 INJECTION, SOLUTION INTRAMUSCULAR; INTRAVENOUS at 07:29

## 2025-08-16 RX ADMIN — CIPROFLOXACIN 500 MG: 500 TABLET ORAL at 08:37

## 2025-08-18 ENCOUNTER — VBI (OUTPATIENT)
Dept: ADMINISTRATIVE | Facility: OTHER | Age: 65
End: 2025-08-18

## 2025-08-18 LAB — BACTERIA UR CULT: ABNORMAL

## 2025-08-20 DIAGNOSIS — E11.40 TYPE 2 DIABETES MELLITUS WITH DIABETIC NEUROPATHY, WITHOUT LONG-TERM CURRENT USE OF INSULIN (HCC): ICD-10-CM

## 2025-08-20 DIAGNOSIS — G89.4 CHRONIC PAIN SYNDROME: ICD-10-CM

## 2025-08-20 DIAGNOSIS — K21.9 GASTROESOPHAGEAL REFLUX DISEASE, UNSPECIFIED WHETHER ESOPHAGITIS PRESENT: ICD-10-CM

## 2025-08-21 RX ORDER — IBUPROFEN 800 MG/1
TABLET, FILM COATED ORAL
Qty: 90 TABLET | Refills: 1 | Status: SHIPPED | OUTPATIENT
Start: 2025-08-21

## 2025-08-21 RX ORDER — FAMOTIDINE 20 MG/1
20 TABLET, FILM COATED ORAL 2 TIMES DAILY
Qty: 60 TABLET | Refills: 5 | Status: SHIPPED | OUTPATIENT
Start: 2025-08-21

## (undated) DEVICE — GLOVE SRG BIOGEL 7.5

## (undated) DEVICE — GAUZE SPONGES,16 PLY: Brand: CURITY

## (undated) DEVICE — 3000CC GUARDIAN II: Brand: GUARDIAN

## (undated) DEVICE — JP PERF DRN SIL FLT 10MM FULL: Brand: CARDINAL HEALTH

## (undated) DEVICE — GLOVE SRG BIOGEL 7

## (undated) DEVICE — PLUMEPEN PRO 10FT

## (undated) DEVICE — KERLIX BANDAGE ROLL: Brand: KERLIX

## (undated) DEVICE — INVIEW CLEAR LEGGINGS: Brand: CONVERTORS

## (undated) DEVICE — JACKSON-PRATT 100CC BULB RESERVOIR: Brand: CARDINAL HEALTH

## (undated) DEVICE — BULB SYRINGE,IRRIGATION WITH PROTECTIVE CAP: Brand: DOVER

## (undated) DEVICE — 3M™ IOBAN™ 2 ANTIMICROBIAL INCISE DRAPE 6650EZ: Brand: IOBAN™ 2

## (undated) DEVICE — SPONGE LAP 18 X 18 IN

## (undated) DEVICE — HEAVY DRAINAGE PACK: Brand: CURITY

## (undated) DEVICE — INTENDED FOR TISSUE SEPARATION, AND OTHER PROCEDURES THAT REQUIRE A SHARP SURGICAL BLADE TO PUNCTURE OR CUT.: Brand: BARD-PARKER SAFETY BLADES SIZE 15, STERILE

## (undated) DEVICE — TIBURON SPLIT SHEET: Brand: CONVERTORS

## (undated) DEVICE — GLOVE INDICATOR PI UNDERGLOVE SZ 7.5 BLUE

## (undated) DEVICE — SPONGE STICK WITH PVP-I: Brand: KENDALL

## (undated) DEVICE — PAD GROUNDING ADULT

## (undated) DEVICE — BETHLEHEM UNIVERSAL OUTPATIENT: Brand: CARDINAL HEALTH

## (undated) DEVICE — 3M™ TEGADERM™ TRANSPARENT FILM DRESSING FRAME STYLE, 1626W, 4 IN X 4-3/4 IN (10 CM X 12 CM), 50/CT 4CT/CASE: Brand: 3M™ TEGADERM™

## (undated) DEVICE — GAUZE SPONGES,USP TYPE VII GAUZE, 12 PLY: Brand: CURITY

## (undated) DEVICE — ABDOMINAL PAD: Brand: DERMACEA